# Patient Record
Sex: MALE | Race: WHITE | NOT HISPANIC OR LATINO | Employment: OTHER | URBAN - METROPOLITAN AREA
[De-identification: names, ages, dates, MRNs, and addresses within clinical notes are randomized per-mention and may not be internally consistent; named-entity substitution may affect disease eponyms.]

---

## 2017-01-01 ENCOUNTER — APPOINTMENT (OUTPATIENT)
Dept: CT IMAGING | Facility: HOSPITAL | Age: 82
End: 2017-01-01

## 2017-01-01 ENCOUNTER — HOSPITAL ENCOUNTER (INPATIENT)
Facility: HOSPITAL | Age: 82
LOS: 14 days | End: 2017-06-19
Attending: EMERGENCY MEDICINE | Admitting: INTERNAL MEDICINE

## 2017-01-01 ENCOUNTER — OFFICE VISIT (OUTPATIENT)
Dept: ENDOCRINOLOGY | Facility: CLINIC | Age: 82
End: 2017-01-01

## 2017-01-01 ENCOUNTER — TELEPHONE (OUTPATIENT)
Dept: INTERNAL MEDICINE | Facility: CLINIC | Age: 82
End: 2017-01-01

## 2017-01-01 ENCOUNTER — APPOINTMENT (OUTPATIENT)
Dept: GENERAL RADIOLOGY | Facility: HOSPITAL | Age: 82
End: 2017-01-01

## 2017-01-01 ENCOUNTER — HOSPITAL ENCOUNTER (INPATIENT)
Facility: HOSPITAL | Age: 82
LOS: 12 days | End: 2017-07-01
Attending: FAMILY MEDICINE | Admitting: FAMILY MEDICINE

## 2017-01-01 ENCOUNTER — APPOINTMENT (OUTPATIENT)
Dept: CARDIOLOGY | Facility: HOSPITAL | Age: 82
End: 2017-01-01

## 2017-01-01 VITALS
HEIGHT: 68 IN | DIASTOLIC BLOOD PRESSURE: 66 MMHG | HEART RATE: 64 BPM | WEIGHT: 153.7 LBS | SYSTOLIC BLOOD PRESSURE: 124 MMHG | OXYGEN SATURATION: 98 % | BODY MASS INDEX: 23.3 KG/M2

## 2017-01-01 VITALS
RESPIRATION RATE: 18 BRPM | HEART RATE: 54 BPM | WEIGHT: 146.2 LBS | SYSTOLIC BLOOD PRESSURE: 116 MMHG | DIASTOLIC BLOOD PRESSURE: 72 MMHG | OXYGEN SATURATION: 99 % | TEMPERATURE: 97.4 F | BODY MASS INDEX: 22.95 KG/M2 | HEIGHT: 67 IN

## 2017-01-01 VITALS
WEIGHT: 146.2 LBS | TEMPERATURE: 97 F | DIASTOLIC BLOOD PRESSURE: 79 MMHG | SYSTOLIC BLOOD PRESSURE: 145 MMHG | RESPIRATION RATE: 14 BRPM | HEIGHT: 67 IN | BODY MASS INDEX: 22.95 KG/M2 | HEART RATE: 94 BPM | OXYGEN SATURATION: 91 %

## 2017-01-01 DIAGNOSIS — I50.22 CHRONIC SYSTOLIC CONGESTIVE HEART FAILURE (HCC): ICD-10-CM

## 2017-01-01 DIAGNOSIS — I49.01 CARDIAC ARREST WITH VENTRICULAR FIBRILLATION (HCC): Primary | ICD-10-CM

## 2017-01-01 DIAGNOSIS — E78.5 DYSLIPIDEMIA: ICD-10-CM

## 2017-01-01 DIAGNOSIS — Z66: ICD-10-CM

## 2017-01-01 DIAGNOSIS — I10 BENIGN ESSENTIAL HYPERTENSION: ICD-10-CM

## 2017-01-01 DIAGNOSIS — N18.4 CHRONIC KIDNEY DISEASE, STAGE IV (SEVERE) (HCC): ICD-10-CM

## 2017-01-01 DIAGNOSIS — A41.9 SEPSIS, DUE TO UNSPECIFIED ORGANISM: ICD-10-CM

## 2017-01-01 DIAGNOSIS — Z74.09 IMPAIRED MOBILITY AND ADLS: ICD-10-CM

## 2017-01-01 DIAGNOSIS — N39.0 ACUTE UTI: ICD-10-CM

## 2017-01-01 DIAGNOSIS — E78.2 MIXED HYPERLIPIDEMIA: ICD-10-CM

## 2017-01-01 DIAGNOSIS — G89.29 CHRONIC MIDLINE LOW BACK PAIN WITHOUT SCIATICA: ICD-10-CM

## 2017-01-01 DIAGNOSIS — Z74.09 IMPAIRED FUNCTIONAL MOBILITY, BALANCE, GAIT, AND ENDURANCE: ICD-10-CM

## 2017-01-01 DIAGNOSIS — I46.9 CARDIAC ARREST WITH VENTRICULAR FIBRILLATION (HCC): Primary | ICD-10-CM

## 2017-01-01 DIAGNOSIS — M54.50 CHRONIC MIDLINE LOW BACK PAIN WITHOUT SCIATICA: ICD-10-CM

## 2017-01-01 DIAGNOSIS — M25.552 PAIN OF LEFT HIP JOINT: ICD-10-CM

## 2017-01-01 DIAGNOSIS — E11.9 TYPE 2 DIABETES MELLITUS WITHOUT COMPLICATION, WITHOUT LONG-TERM CURRENT USE OF INSULIN (HCC): Primary | ICD-10-CM

## 2017-01-01 DIAGNOSIS — I10 ESSENTIAL HYPERTENSION: ICD-10-CM

## 2017-01-01 DIAGNOSIS — Z78.9 IMPAIRED MOBILITY AND ADLS: ICD-10-CM

## 2017-01-01 LAB
ALBUMIN SERPL-MCNC: 3.3 G/DL (ref 3.2–4.8)
ALBUMIN SERPL-MCNC: 3.3 G/DL (ref 3.2–4.8)
ALBUMIN SERPL-MCNC: 3.4 G/DL (ref 3.2–4.8)
ALBUMIN SERPL-MCNC: 3.5 G/DL (ref 3.2–4.8)
ALBUMIN SERPL-MCNC: 4.1 G/DL (ref 3.2–4.8)
ALBUMIN SERPL-MCNC: 4.5 G/DL (ref 3.2–4.8)
ALBUMIN/GLOB SERPL: 1.2 G/DL (ref 1.5–2.5)
ALBUMIN/GLOB SERPL: 1.5 G/DL (ref 1.5–2.5)
ALBUMIN/GLOB SERPL: 1.5 G/DL (ref 1.5–2.5)
ALBUMIN/GLOB SERPL: 1.6 G/DL (ref 1.5–2.5)
ALBUMIN/GLOB SERPL: 1.7 G/DL (ref 1.5–2.5)
ALP SERPL-CCNC: 56 U/L (ref 25–100)
ALP SERPL-CCNC: 59 U/L (ref 25–100)
ALP SERPL-CCNC: 59 U/L (ref 25–100)
ALP SERPL-CCNC: 67 U/L (ref 25–100)
ALP SERPL-CCNC: 78 U/L (ref 25–100)
ALT SERPL W P-5'-P-CCNC: 10 U/L (ref 7–40)
ALT SERPL W P-5'-P-CCNC: 19 U/L (ref 7–40)
ALT SERPL W P-5'-P-CCNC: 22 U/L (ref 7–40)
ALT SERPL W P-5'-P-CCNC: 23 U/L (ref 7–40)
ALT SERPL W P-5'-P-CCNC: 8 U/L (ref 7–40)
ANION GAP SERPL CALCULATED.3IONS-SCNC: 10 MMOL/L (ref 3–11)
ANION GAP SERPL CALCULATED.3IONS-SCNC: 10 MMOL/L (ref 3–11)
ANION GAP SERPL CALCULATED.3IONS-SCNC: 11 MMOL/L (ref 3–11)
ANION GAP SERPL CALCULATED.3IONS-SCNC: 14 MMOL/L (ref 3–11)
ANION GAP SERPL CALCULATED.3IONS-SCNC: 7 MMOL/L (ref 3–11)
ANION GAP SERPL CALCULATED.3IONS-SCNC: 7 MMOL/L (ref 3–11)
ANION GAP SERPL CALCULATED.3IONS-SCNC: 8 MMOL/L (ref 3–11)
ANION GAP SERPL CALCULATED.3IONS-SCNC: 9 MMOL/L (ref 3–11)
ARTERIAL PATENCY WRIST A: ABNORMAL
ARTICHOKE IGE QN: 60 MG/DL (ref 0–130)
AST SERPL-CCNC: 19 U/L (ref 0–33)
AST SERPL-CCNC: 20 U/L (ref 0–33)
AST SERPL-CCNC: 22 U/L (ref 0–33)
AST SERPL-CCNC: 23 U/L (ref 0–33)
AST SERPL-CCNC: 32 U/L (ref 0–33)
ATMOSPHERIC PRESS: ABNORMAL MMHG
BACTERIA BLD CULT: ABNORMAL
BACTERIA SPEC AEROBE CULT: ABNORMAL
BACTERIA SPEC AEROBE CULT: NORMAL
BACTERIA SPEC AEROBE CULT: NORMAL
BACTERIA UR QL AUTO: ABNORMAL /HPF
BASE EXCESS BLDA CALC-SCNC: -8.2 MMOL/L (ref 0–2)
BASOPHILS # BLD AUTO: 0.01 10*3/MM3 (ref 0–0.2)
BASOPHILS # BLD AUTO: 0.02 10*3/MM3 (ref 0–0.2)
BASOPHILS # BLD AUTO: 0.03 10*3/MM3 (ref 0–0.2)
BASOPHILS # BLD AUTO: 0.04 10*3/MM3 (ref 0–0.2)
BASOPHILS # BLD AUTO: 0.04 10*3/MM3 (ref 0–0.2)
BASOPHILS NFR BLD AUTO: 0.1 % (ref 0–1)
BASOPHILS NFR BLD AUTO: 0.3 % (ref 0–1)
BASOPHILS NFR BLD AUTO: 0.5 % (ref 0–1)
BASOPHILS NFR BLD AUTO: 0.6 % (ref 0–1)
BDY SITE: ABNORMAL
BH CV ECHO MEAS - AI DEC SLOPE: 109 CM/SEC^2
BH CV ECHO MEAS - AI MAX PG: 21 MMHG
BH CV ECHO MEAS - AI MAX VEL: 229 CM/SEC
BH CV ECHO MEAS - AI P1/2T: 615.3 MSEC
BH CV ECHO MEAS - AO ROOT AREA (BSA CORRECTED): 1.9
BH CV ECHO MEAS - AO ROOT AREA: 9.1 CM^2
BH CV ECHO MEAS - AO ROOT DIAM: 3.4 CM
BH CV ECHO MEAS - BSA(HAYCOCK): 1.8 M^2
BH CV ECHO MEAS - BSA: 1.8 M^2
BH CV ECHO MEAS - BZI_BMI: 24 KILOGRAMS/M^2
BH CV ECHO MEAS - BZI_METRIC_HEIGHT: 170.2 CM
BH CV ECHO MEAS - BZI_METRIC_WEIGHT: 69.4 KG
BH CV ECHO MEAS - CONTRAST EF (2CH): 24.5 ML/M^2
BH CV ECHO MEAS - CONTRAST EF 4CH: 2.7 ML/M^2
BH CV ECHO MEAS - EDV(CUBED): 317.2 ML
BH CV ECHO MEAS - EDV(MOD-SP2): 314 ML
BH CV ECHO MEAS - EDV(MOD-SP4): 294 ML
BH CV ECHO MEAS - EDV(TEICH): 240.8 ML
BH CV ECHO MEAS - EF(CUBED): 19.7 %
BH CV ECHO MEAS - EF(MOD-SP2): 24.5 %
BH CV ECHO MEAS - EF(TEICH): 15.3 %
BH CV ECHO MEAS - ESV(CUBED): 254.8 ML
BH CV ECHO MEAS - ESV(MOD-SP2): 237 ML
BH CV ECHO MEAS - ESV(MOD-SP4): 286 ML
BH CV ECHO MEAS - ESV(TEICH): 204.1 ML
BH CV ECHO MEAS - FS: 7 %
BH CV ECHO MEAS - IVS/LVPW: 1
BH CV ECHO MEAS - IVSD: 1.3 CM
BH CV ECHO MEAS - LA DIMENSION: 3.5 CM
BH CV ECHO MEAS - LA/AO: 1
BH CV ECHO MEAS - LV DIASTOLIC VOL/BSA (35-75): 162.9 ML/M^2
BH CV ECHO MEAS - LV MASS(C)D: 405.9 GRAMS
BH CV ECHO MEAS - LV MASS(C)DI: 224.9 GRAMS/M^2
BH CV ECHO MEAS - LV SYSTOLIC VOL/BSA (12-30): 158.5 ML/M^2
BH CV ECHO MEAS - LVIDD: 6.8 CM
BH CV ECHO MEAS - LVIDS: 6.3 CM
BH CV ECHO MEAS - LVLD AP2: 10.3 CM
BH CV ECHO MEAS - LVLD AP4: 9.8 CM
BH CV ECHO MEAS - LVLS AP2: 9.5 CM
BH CV ECHO MEAS - LVLS AP4: 9.8 CM
BH CV ECHO MEAS - LVOT AREA (M): 3.8 CM^2
BH CV ECHO MEAS - LVOT AREA: 3.8 CM^2
BH CV ECHO MEAS - LVOT DIAM: 2.2 CM
BH CV ECHO MEAS - LVPWD: 1.2 CM
BH CV ECHO MEAS - MV A MAX VEL: 71.6 CM/SEC
BH CV ECHO MEAS - MV DEC SLOPE: 758.5 CM/SEC^2
BH CV ECHO MEAS - MV DEC TIME: 0.2 SEC
BH CV ECHO MEAS - MV E MAX VEL: 83.9 CM/SEC
BH CV ECHO MEAS - MV E/A: 1.2
BH CV ECHO MEAS - MV P1/2T MAX VEL: 104 CM/SEC
BH CV ECHO MEAS - MV P1/2T: 40.2 MSEC
BH CV ECHO MEAS - MVA P1/2T LCG: 2.1 CM^2
BH CV ECHO MEAS - MVA(P1/2T): 5.5 CM^2
BH CV ECHO MEAS - PA ACC SLOPE: 477 CM/SEC^2
BH CV ECHO MEAS - PA ACC TIME: 0.1 SEC
BH CV ECHO MEAS - PA PR(ACCEL): 34 MMHG
BH CV ECHO MEAS - RAP SYSTOLE: 3 MMHG
BH CV ECHO MEAS - RV MAX PG: 0.71 MMHG
BH CV ECHO MEAS - RV V1 MAX: 42 CM/SEC
BH CV ECHO MEAS - RVSP: 49 MMHG
BH CV ECHO MEAS - SI(CUBED): 34.6 ML/M^2
BH CV ECHO MEAS - SI(MOD-SP2): 42.7 ML/M^2
BH CV ECHO MEAS - SI(MOD-SP4): 4.4 ML/M^2
BH CV ECHO MEAS - SI(TEICH): 20.4 ML/M^2
BH CV ECHO MEAS - SV(CUBED): 62.4 ML
BH CV ECHO MEAS - SV(MOD-SP2): 77 ML
BH CV ECHO MEAS - SV(MOD-SP4): 8 ML
BH CV ECHO MEAS - SV(TEICH): 36.7 ML
BH CV ECHO MEAS - TAPSE (>1.6): 1.7 CM2
BH CV ECHO MEAS - TR MAX VEL: 338 CM/SEC
BH CV VAS BP LEFT ARM: NORMAL MMHG
BH CV XLRA - RV BASE: 4.5 CM
BH CV XLRA - RV LENGTH: 7.3 CM
BH CV XLRA - RV MID: 3.6 CM
BH CV XLRA - TDI S': 6.52 CM/SEC
BILIRUB SERPL-MCNC: 0.6 MG/DL (ref 0.3–1.2)
BILIRUB SERPL-MCNC: 0.7 MG/DL (ref 0.3–1.2)
BILIRUB SERPL-MCNC: 0.8 MG/DL (ref 0.3–1.2)
BILIRUB UR QL STRIP: NEGATIVE
BNP SERPL-MCNC: 2636 PG/ML (ref 0–100)
BUN BLD-MCNC: 19 MG/DL (ref 9–23)
BUN BLD-MCNC: 21 MG/DL (ref 9–23)
BUN BLD-MCNC: 23 MG/DL (ref 9–23)
BUN BLD-MCNC: 23 MG/DL (ref 9–23)
BUN BLD-MCNC: 25 MG/DL (ref 9–23)
BUN BLD-MCNC: 25 MG/DL (ref 9–23)
BUN BLD-MCNC: 26 MG/DL (ref 9–23)
BUN BLD-MCNC: 30 MG/DL (ref 9–23)
BUN/CREAT SERPL: 10.6 (ref 7–25)
BUN/CREAT SERPL: 11.4 (ref 7–25)
BUN/CREAT SERPL: 12.1 (ref 7–25)
BUN/CREAT SERPL: 12.8 (ref 7–25)
BUN/CREAT SERPL: 13.9 (ref 7–25)
BUN/CREAT SERPL: 14 (ref 7–25)
BUN/CREAT SERPL: 16.3 (ref 7–25)
BUN/CREAT SERPL: 16.3 (ref 7–25)
BUN/CREAT SERPL: 17.3 (ref 7–25)
BUN/CREAT SERPL: 18.8 (ref 7–25)
CALCIUM SPEC-SCNC: 10 MG/DL (ref 8.7–10.4)
CALCIUM SPEC-SCNC: 10.1 MG/DL (ref 8.7–10.4)
CALCIUM SPEC-SCNC: 10.2 MG/DL (ref 8.7–10.4)
CALCIUM SPEC-SCNC: 8.9 MG/DL (ref 8.7–10.4)
CALCIUM SPEC-SCNC: 9 MG/DL (ref 8.7–10.4)
CALCIUM SPEC-SCNC: 9.5 MG/DL (ref 8.7–10.4)
CALCIUM SPEC-SCNC: 9.7 MG/DL (ref 8.7–10.4)
CALCIUM SPEC-SCNC: 9.9 MG/DL (ref 8.7–10.4)
CHLORIDE SERPL-SCNC: 101 MMOL/L (ref 99–109)
CHLORIDE SERPL-SCNC: 102 MMOL/L (ref 99–109)
CHLORIDE SERPL-SCNC: 104 MMOL/L (ref 99–109)
CHLORIDE SERPL-SCNC: 105 MMOL/L (ref 99–109)
CHLORIDE SERPL-SCNC: 105 MMOL/L (ref 99–109)
CHLORIDE SERPL-SCNC: 106 MMOL/L (ref 99–109)
CHLORIDE SERPL-SCNC: 107 MMOL/L (ref 99–109)
CHLORIDE SERPL-SCNC: 109 MMOL/L (ref 99–109)
CHOLEST SERPL-MCNC: 141 MG/DL (ref 0–200)
CLARITY UR: CLEAR
CO2 BLDA-SCNC: 16.8 MMOL/L (ref 22–33)
CO2 SERPL-SCNC: 22 MMOL/L (ref 20–31)
CO2 SERPL-SCNC: 22 MMOL/L (ref 20–31)
CO2 SERPL-SCNC: 23 MMOL/L (ref 20–31)
CO2 SERPL-SCNC: 24 MMOL/L (ref 20–31)
CO2 SERPL-SCNC: 29 MMOL/L (ref 20–31)
COHGB MFR BLD: 0.8 % (ref 0–2)
COLOR UR: YELLOW
CREAT BLD-MCNC: 1.5 MG/DL (ref 0.6–1.3)
CREAT BLD-MCNC: 1.5 MG/DL (ref 0.6–1.3)
CREAT BLD-MCNC: 1.6 MG/DL (ref 0.6–1.3)
CREAT BLD-MCNC: 1.8 MG/DL (ref 0.6–1.3)
CREAT BLD-MCNC: 1.9 MG/DL (ref 0.6–1.3)
CREAT BLD-MCNC: 2.2 MG/DL (ref 0.6–1.3)
CRP SERPL-MCNC: 12.5 MG/DL (ref 0–1)
D-LACTATE SERPL-SCNC: 1.8 MMOL/L (ref 0.5–2)
D-LACTATE SERPL-SCNC: 2.5 MMOL/L (ref 0.5–2)
DEPRECATED RDW RBC AUTO: 47 FL (ref 37–54)
DEPRECATED RDW RBC AUTO: 47.4 FL (ref 37–54)
DEPRECATED RDW RBC AUTO: 48.2 FL (ref 37–54)
DEPRECATED RDW RBC AUTO: 48.3 FL (ref 37–54)
DEPRECATED RDW RBC AUTO: 49.3 FL (ref 37–54)
DEPRECATED RDW RBC AUTO: 49.8 FL (ref 37–54)
DEPRECATED RDW RBC AUTO: 50.1 FL (ref 37–54)
DEPRECATED RDW RBC AUTO: 50.2 FL (ref 37–54)
DEPRECATED RDW RBC AUTO: 54 FL (ref 37–54)
EOSINOPHIL # BLD AUTO: 0 10*3/MM3 (ref 0.1–0.3)
EOSINOPHIL # BLD AUTO: 0.01 10*3/MM3 (ref 0.1–0.3)
EOSINOPHIL # BLD AUTO: 0.03 10*3/MM3 (ref 0.1–0.3)
EOSINOPHIL # BLD AUTO: 0.07 10*3/MM3 (ref 0.1–0.3)
EOSINOPHIL # BLD AUTO: 0.1 10*3/MM3 (ref 0.1–0.3)
EOSINOPHIL # BLD AUTO: 0.16 10*3/MM3 (ref 0.1–0.3)
EOSINOPHIL # BLD AUTO: 0.2 10*3/MM3 (ref 0.1–0.3)
EOSINOPHIL NFR BLD AUTO: 0 % (ref 0–3)
EOSINOPHIL NFR BLD AUTO: 0.1 % (ref 0–3)
EOSINOPHIL NFR BLD AUTO: 0.2 % (ref 0–3)
EOSINOPHIL NFR BLD AUTO: 0.9 % (ref 0–3)
EOSINOPHIL NFR BLD AUTO: 1.3 % (ref 0–3)
EOSINOPHIL NFR BLD AUTO: 1.7 % (ref 0–3)
EOSINOPHIL NFR BLD AUTO: 2.8 % (ref 0–3)
ERYTHROCYTE [DISTWIDTH] IN BLOOD BY AUTOMATED COUNT: 13.4 % (ref 11.3–14.5)
ERYTHROCYTE [DISTWIDTH] IN BLOOD BY AUTOMATED COUNT: 13.4 % (ref 11.3–14.5)
ERYTHROCYTE [DISTWIDTH] IN BLOOD BY AUTOMATED COUNT: 13.6 % (ref 11.3–14.5)
ERYTHROCYTE [DISTWIDTH] IN BLOOD BY AUTOMATED COUNT: 13.7 % (ref 11.3–14.5)
ERYTHROCYTE [DISTWIDTH] IN BLOOD BY AUTOMATED COUNT: 13.8 % (ref 11.3–14.5)
ERYTHROCYTE [DISTWIDTH] IN BLOOD BY AUTOMATED COUNT: 13.9 % (ref 11.3–14.5)
ERYTHROCYTE [DISTWIDTH] IN BLOOD BY AUTOMATED COUNT: 13.9 % (ref 11.3–14.5)
ERYTHROCYTE [DISTWIDTH] IN BLOOD BY AUTOMATED COUNT: 14.1 % (ref 11.3–14.5)
ERYTHROCYTE [DISTWIDTH] IN BLOOD BY AUTOMATED COUNT: 14.3 % (ref 11.3–14.5)
ERYTHROCYTE [SEDIMENTATION RATE] IN BLOOD: <1 MM/HR (ref 0–20)
FOLATE SERPL-MCNC: >24 NG/ML (ref 3.2–20)
GFR SERPL CREATININE-BSD FRML MDRD: 28 ML/MIN/1.73
GFR SERPL CREATININE-BSD FRML MDRD: 34 ML/MIN/1.73
GFR SERPL CREATININE-BSD FRML MDRD: 36 ML/MIN/1.73
GFR SERPL CREATININE-BSD FRML MDRD: 41 ML/MIN/1.73
GFR SERPL CREATININE-BSD FRML MDRD: 44 ML/MIN/1.73
GFR SERPL CREATININE-BSD FRML MDRD: 44 ML/MIN/1.73
GLOBULIN UR ELPH-MCNC: 2.3 GM/DL
GLOBULIN UR ELPH-MCNC: 2.4 GM/DL
GLOBULIN UR ELPH-MCNC: 2.4 GM/DL
GLOBULIN UR ELPH-MCNC: 2.7 GM/DL
GLOBULIN UR ELPH-MCNC: 2.8 GM/DL
GLUCOSE BLD-MCNC: 118 MG/DL (ref 70–100)
GLUCOSE BLD-MCNC: 122 MG/DL (ref 70–100)
GLUCOSE BLD-MCNC: 129 MG/DL (ref 70–100)
GLUCOSE BLD-MCNC: 132 MG/DL (ref 70–100)
GLUCOSE BLD-MCNC: 137 MG/DL (ref 70–100)
GLUCOSE BLD-MCNC: 143 MG/DL (ref 70–100)
GLUCOSE BLD-MCNC: 157 MG/DL (ref 70–100)
GLUCOSE BLD-MCNC: 162 MG/DL (ref 70–100)
GLUCOSE BLD-MCNC: 180 MG/DL (ref 70–100)
GLUCOSE BLD-MCNC: 242 MG/DL (ref 70–100)
GLUCOSE BLDC GLUCOMTR-MCNC: 105 MG/DL (ref 70–130)
GLUCOSE BLDC GLUCOMTR-MCNC: 107 MG/DL (ref 70–130)
GLUCOSE BLDC GLUCOMTR-MCNC: 107 MG/DL (ref 70–130)
GLUCOSE BLDC GLUCOMTR-MCNC: 108 MG/DL (ref 70–130)
GLUCOSE BLDC GLUCOMTR-MCNC: 113 MG/DL (ref 70–130)
GLUCOSE BLDC GLUCOMTR-MCNC: 114 MG/DL (ref 70–130)
GLUCOSE BLDC GLUCOMTR-MCNC: 114 MG/DL (ref 70–130)
GLUCOSE BLDC GLUCOMTR-MCNC: 115 MG/DL (ref 70–130)
GLUCOSE BLDC GLUCOMTR-MCNC: 116 MG/DL (ref 70–130)
GLUCOSE BLDC GLUCOMTR-MCNC: 118 MG/DL (ref 70–130)
GLUCOSE BLDC GLUCOMTR-MCNC: 124 MG/DL (ref 70–130)
GLUCOSE BLDC GLUCOMTR-MCNC: 125 MG/DL (ref 70–130)
GLUCOSE BLDC GLUCOMTR-MCNC: 127 MG/DL (ref 70–130)
GLUCOSE BLDC GLUCOMTR-MCNC: 128 MG/DL (ref 70–130)
GLUCOSE BLDC GLUCOMTR-MCNC: 130 MG/DL (ref 70–130)
GLUCOSE BLDC GLUCOMTR-MCNC: 132 MG/DL (ref 70–130)
GLUCOSE BLDC GLUCOMTR-MCNC: 133 MG/DL (ref 70–130)
GLUCOSE BLDC GLUCOMTR-MCNC: 133 MG/DL (ref 70–130)
GLUCOSE BLDC GLUCOMTR-MCNC: 135 MG/DL (ref 70–130)
GLUCOSE BLDC GLUCOMTR-MCNC: 136 MG/DL (ref 70–130)
GLUCOSE BLDC GLUCOMTR-MCNC: 136 MG/DL (ref 70–130)
GLUCOSE BLDC GLUCOMTR-MCNC: 139 MG/DL (ref 70–130)
GLUCOSE BLDC GLUCOMTR-MCNC: 142 MG/DL (ref 70–130)
GLUCOSE BLDC GLUCOMTR-MCNC: 144 MG/DL (ref 70–130)
GLUCOSE BLDC GLUCOMTR-MCNC: 144 MG/DL (ref 70–130)
GLUCOSE BLDC GLUCOMTR-MCNC: 145 MG/DL (ref 70–130)
GLUCOSE BLDC GLUCOMTR-MCNC: 149 MG/DL (ref 70–130)
GLUCOSE BLDC GLUCOMTR-MCNC: 150 MG/DL (ref 70–130)
GLUCOSE BLDC GLUCOMTR-MCNC: 152 MG/DL (ref 70–130)
GLUCOSE BLDC GLUCOMTR-MCNC: 154 MG/DL (ref 70–130)
GLUCOSE BLDC GLUCOMTR-MCNC: 156 MG/DL (ref 70–130)
GLUCOSE BLDC GLUCOMTR-MCNC: 156 MG/DL (ref 70–130)
GLUCOSE BLDC GLUCOMTR-MCNC: 159 MG/DL (ref 70–130)
GLUCOSE BLDC GLUCOMTR-MCNC: 159 MG/DL (ref 70–130)
GLUCOSE BLDC GLUCOMTR-MCNC: 160 MG/DL (ref 70–130)
GLUCOSE BLDC GLUCOMTR-MCNC: 161 MG/DL (ref 70–130)
GLUCOSE BLDC GLUCOMTR-MCNC: 168 MG/DL (ref 70–130)
GLUCOSE BLDC GLUCOMTR-MCNC: 173 MG/DL (ref 70–130)
GLUCOSE BLDC GLUCOMTR-MCNC: 174 MG/DL (ref 70–130)
GLUCOSE BLDC GLUCOMTR-MCNC: 180 MG/DL (ref 70–130)
GLUCOSE BLDC GLUCOMTR-MCNC: 180 MG/DL (ref 70–130)
GLUCOSE BLDC GLUCOMTR-MCNC: 181 MG/DL (ref 70–130)
GLUCOSE BLDC GLUCOMTR-MCNC: 188 MG/DL (ref 70–130)
GLUCOSE BLDC GLUCOMTR-MCNC: 192 MG/DL (ref 70–130)
GLUCOSE BLDC GLUCOMTR-MCNC: 197 MG/DL (ref 70–130)
GLUCOSE BLDC GLUCOMTR-MCNC: 197 MG/DL (ref 70–130)
GLUCOSE BLDC GLUCOMTR-MCNC: 217 MG/DL (ref 70–130)
GLUCOSE BLDC GLUCOMTR-MCNC: 237 MG/DL (ref 70–130)
GLUCOSE BLDC GLUCOMTR-MCNC: 247 MG/DL (ref 70–130)
GLUCOSE BLDC GLUCOMTR-MCNC: 71 MG/DL (ref 70–130)
GLUCOSE BLDC GLUCOMTR-MCNC: 89 MG/DL (ref 70–130)
GLUCOSE BLDC GLUCOMTR-MCNC: 92 MG/DL (ref 70–130)
GLUCOSE BLDC GLUCOMTR-MCNC: 98 MG/DL (ref 70–130)
GLUCOSE UR STRIP-MCNC: NEGATIVE MG/DL
GRAM STN SPEC: ABNORMAL
HBA1C MFR BLD: 6.1 %
HCO3 BLDA-SCNC: 16 MMOL/L (ref 20–26)
HCT VFR BLD AUTO: 37.3 % (ref 38.9–50.9)
HCT VFR BLD AUTO: 37.7 % (ref 38.9–50.9)
HCT VFR BLD AUTO: 37.9 % (ref 38.9–50.9)
HCT VFR BLD AUTO: 38.2 % (ref 38.9–50.9)
HCT VFR BLD AUTO: 38.7 % (ref 38.9–50.9)
HCT VFR BLD AUTO: 41.3 % (ref 38.9–50.9)
HCT VFR BLD AUTO: 43.4 % (ref 38.9–50.9)
HCT VFR BLD AUTO: 45.2 % (ref 38.9–50.9)
HCT VFR BLD AUTO: 48 % (ref 38.9–50.9)
HCT VFR BLD CALC: 43.5 %
HDLC SERPL-MCNC: 62 MG/DL (ref 40–60)
HGB BLD-MCNC: 11.8 G/DL (ref 13.1–17.5)
HGB BLD-MCNC: 12 G/DL (ref 13.1–17.5)
HGB BLD-MCNC: 12.2 G/DL (ref 13.1–17.5)
HGB BLD-MCNC: 12.2 G/DL (ref 13.1–17.5)
HGB BLD-MCNC: 12.3 G/DL (ref 13.1–17.5)
HGB BLD-MCNC: 13.2 G/DL (ref 13.1–17.5)
HGB BLD-MCNC: 13.3 G/DL (ref 13.1–17.5)
HGB BLD-MCNC: 14.6 G/DL (ref 13.1–17.5)
HGB BLD-MCNC: 15.6 G/DL (ref 13.1–17.5)
HGB BLDA-MCNC: 14.2 G/DL (ref 13.5–17.5)
HGB UR QL STRIP.AUTO: ABNORMAL
HOLD SPECIMEN: NORMAL
HOLD SPECIMEN: NORMAL
HOROWITZ INDEX BLD+IHG-RTO: 100 %
HYALINE CASTS UR QL AUTO: ABNORMAL /LPF
IMM GRANULOCYTES # BLD: 0.01 10*3/MM3 (ref 0–0.03)
IMM GRANULOCYTES # BLD: 0.02 10*3/MM3 (ref 0–0.03)
IMM GRANULOCYTES # BLD: 0.03 10*3/MM3 (ref 0–0.03)
IMM GRANULOCYTES NFR BLD: 0.1 % (ref 0–0.6)
IMM GRANULOCYTES NFR BLD: 0.2 % (ref 0–0.6)
IMM GRANULOCYTES NFR BLD: 0.3 % (ref 0–0.6)
IMM GRANULOCYTES NFR BLD: 0.3 % (ref 0–0.6)
INR PPP: 1.05
ISOLATED FROM: ABNORMAL
ISOLATED FROM: ABNORMAL
KETONES UR QL STRIP: NEGATIVE
LEFT ATRIUM VOLUME INDEX: 27.8 ML/M2
LEFT ATRIUM VOLUME: 50 CM3
LEUKOCYTE ESTERASE UR QL STRIP.AUTO: ABNORMAL
LIPASE SERPL-CCNC: 33 U/L (ref 6–51)
LV EF 2D ECHO EST: 15 %
LYMPHOCYTES # BLD AUTO: 0.51 10*3/MM3 (ref 0.6–4.8)
LYMPHOCYTES # BLD AUTO: 0.59 10*3/MM3 (ref 0.6–4.8)
LYMPHOCYTES # BLD AUTO: 0.68 10*3/MM3 (ref 0.6–4.8)
LYMPHOCYTES # BLD AUTO: 0.78 10*3/MM3 (ref 0.6–4.8)
LYMPHOCYTES # BLD AUTO: 0.81 10*3/MM3 (ref 0.6–4.8)
LYMPHOCYTES # BLD AUTO: 0.94 10*3/MM3 (ref 0.6–4.8)
LYMPHOCYTES # BLD AUTO: 1.37 10*3/MM3 (ref 0.6–4.8)
LYMPHOCYTES NFR BLD AUTO: 18.8 % (ref 24–44)
LYMPHOCYTES NFR BLD AUTO: 4.5 % (ref 24–44)
LYMPHOCYTES NFR BLD AUTO: 6.3 % (ref 24–44)
LYMPHOCYTES NFR BLD AUTO: 7 % (ref 24–44)
LYMPHOCYTES NFR BLD AUTO: 9 % (ref 24–44)
LYMPHOCYTES NFR BLD AUTO: 9.8 % (ref 24–44)
LYMPHOCYTES NFR BLD AUTO: 9.8 % (ref 24–44)
MAGNESIUM SERPL-MCNC: 1.7 MG/DL (ref 1.3–2.7)
MAGNESIUM SERPL-MCNC: 2 MG/DL (ref 1.3–2.7)
MAGNESIUM SERPL-MCNC: 2.2 MG/DL (ref 1.3–2.7)
MCH RBC QN AUTO: 30.7 PG (ref 27–31)
MCH RBC QN AUTO: 31 PG (ref 27–31)
MCH RBC QN AUTO: 31.5 PG (ref 27–31)
MCH RBC QN AUTO: 31.6 PG (ref 27–31)
MCH RBC QN AUTO: 31.6 PG (ref 27–31)
MCH RBC QN AUTO: 31.7 PG (ref 27–31)
MCH RBC QN AUTO: 31.8 PG (ref 27–31)
MCHC RBC AUTO-ENTMCNC: 30.6 G/DL (ref 32–36)
MCHC RBC AUTO-ENTMCNC: 31.3 G/DL (ref 32–36)
MCHC RBC AUTO-ENTMCNC: 31.4 G/DL (ref 32–36)
MCHC RBC AUTO-ENTMCNC: 31.8 G/DL (ref 32–36)
MCHC RBC AUTO-ENTMCNC: 32 G/DL (ref 32–36)
MCHC RBC AUTO-ENTMCNC: 32.2 G/DL (ref 32–36)
MCHC RBC AUTO-ENTMCNC: 32.3 G/DL (ref 32–36)
MCHC RBC AUTO-ENTMCNC: 32.5 G/DL (ref 32–36)
MCHC RBC AUTO-ENTMCNC: 32.7 G/DL (ref 32–36)
MCV RBC AUTO: 103.3 FL (ref 80–99)
MCV RBC AUTO: 95.2 FL (ref 80–99)
MCV RBC AUTO: 96.4 FL (ref 80–99)
MCV RBC AUTO: 97.5 FL (ref 80–99)
MCV RBC AUTO: 97.8 FL (ref 80–99)
MCV RBC AUTO: 98 FL (ref 80–99)
MCV RBC AUTO: 98.7 FL (ref 80–99)
MCV RBC AUTO: 98.8 FL (ref 80–99)
MCV RBC AUTO: 99 FL (ref 80–99)
METHGB BLD QL: 1 % (ref 0–1.5)
MODALITY: ABNORMAL
MONOCYTES # BLD AUTO: 0.79 10*3/MM3 (ref 0–1)
MONOCYTES # BLD AUTO: 0.81 10*3/MM3 (ref 0–1)
MONOCYTES # BLD AUTO: 0.95 10*3/MM3 (ref 0–1)
MONOCYTES # BLD AUTO: 0.96 10*3/MM3 (ref 0–1)
MONOCYTES # BLD AUTO: 1.04 10*3/MM3 (ref 0–1)
MONOCYTES # BLD AUTO: 1.24 10*3/MM3 (ref 0–1)
MONOCYTES # BLD AUTO: 1.34 10*3/MM3 (ref 0–1)
MONOCYTES NFR BLD AUTO: 10.8 % (ref 0–12)
MONOCYTES NFR BLD AUTO: 10.9 % (ref 0–12)
MONOCYTES NFR BLD AUTO: 11.1 % (ref 0–12)
MONOCYTES NFR BLD AUTO: 11.5 % (ref 0–12)
MONOCYTES NFR BLD AUTO: 11.7 % (ref 0–12)
MONOCYTES NFR BLD AUTO: 12.1 % (ref 0–12)
MONOCYTES NFR BLD AUTO: 9.2 % (ref 0–12)
NEUTROPHILS # BLD AUTO: 11.2 10*3/MM3 (ref 1.5–8.3)
NEUTROPHILS # BLD AUTO: 4.86 10*3/MM3 (ref 1.5–8.3)
NEUTROPHILS # BLD AUTO: 5.87 10*3/MM3 (ref 1.5–8.3)
NEUTROPHILS # BLD AUTO: 6.12 10*3/MM3 (ref 1.5–8.3)
NEUTROPHILS # BLD AUTO: 6.47 10*3/MM3 (ref 1.5–8.3)
NEUTROPHILS # BLD AUTO: 7.52 10*3/MM3 (ref 1.5–8.3)
NEUTROPHILS # BLD AUTO: 9.53 10*3/MM3 (ref 1.5–8.3)
NEUTROPHILS NFR BLD AUTO: 66.8 % (ref 41–71)
NEUTROPHILS NFR BLD AUTO: 76.8 % (ref 41–71)
NEUTROPHILS NFR BLD AUTO: 78.1 % (ref 41–71)
NEUTROPHILS NFR BLD AUTO: 78.3 % (ref 41–71)
NEUTROPHILS NFR BLD AUTO: 80.4 % (ref 41–71)
NEUTROPHILS NFR BLD AUTO: 83.3 % (ref 41–71)
NEUTROPHILS NFR BLD AUTO: 83.5 % (ref 41–71)
NITRITE UR QL STRIP: NEGATIVE
OXYHGB MFR BLDV: 95.5 % (ref 94–99)
PCO2 BLDA: 27.3 MM HG (ref 35–48)
PH BLDA: 7.38 PH UNITS (ref 7.35–7.45)
PH UR STRIP.AUTO: 6 [PH] (ref 5–8)
PHOSPHATE SERPL-MCNC: 2.4 MG/DL (ref 2.4–5.1)
PHOSPHATE SERPL-MCNC: 2.4 MG/DL (ref 2.4–5.1)
PLATELET # BLD AUTO: 120 10*3/MM3 (ref 150–450)
PLATELET # BLD AUTO: 132 10*3/MM3 (ref 150–450)
PLATELET # BLD AUTO: 135 10*3/MM3 (ref 150–450)
PLATELET # BLD AUTO: 157 10*3/MM3 (ref 150–450)
PLATELET # BLD AUTO: 162 10*3/MM3 (ref 150–450)
PLATELET # BLD AUTO: 173 10*3/MM3 (ref 150–450)
PLATELET # BLD AUTO: 203 10*3/MM3 (ref 150–450)
PLATELET # BLD AUTO: 229 10*3/MM3 (ref 150–450)
PLATELET # BLD AUTO: 231 10*3/MM3 (ref 150–450)
PMV BLD AUTO: 10 FL (ref 6–12)
PMV BLD AUTO: 10.8 FL (ref 6–12)
PMV BLD AUTO: 10.9 FL (ref 6–12)
PMV BLD AUTO: 11.2 FL (ref 6–12)
PMV BLD AUTO: 11.4 FL (ref 6–12)
PMV BLD AUTO: 11.6 FL (ref 6–12)
PMV BLD AUTO: 11.8 FL (ref 6–12)
PMV BLD AUTO: 9.8 FL (ref 6–12)
PMV BLD AUTO: 9.8 FL (ref 6–12)
PO2 BLDA: 91.7 MM HG (ref 83–108)
POTASSIUM BLD-SCNC: 3.3 MMOL/L (ref 3.5–5.5)
POTASSIUM BLD-SCNC: 3.5 MMOL/L (ref 3.5–5.5)
POTASSIUM BLD-SCNC: 3.6 MMOL/L (ref 3.5–5.5)
POTASSIUM BLD-SCNC: 3.6 MMOL/L (ref 3.5–5.5)
POTASSIUM BLD-SCNC: 3.7 MMOL/L (ref 3.5–5.5)
POTASSIUM BLD-SCNC: 3.8 MMOL/L (ref 3.5–5.5)
POTASSIUM BLD-SCNC: 3.8 MMOL/L (ref 3.5–5.5)
POTASSIUM BLD-SCNC: 4.4 MMOL/L (ref 3.5–5.5)
POTASSIUM BLD-SCNC: 4.5 MMOL/L (ref 3.5–5.5)
PROCALCITONIN SERPL-MCNC: 0.11 NG/ML
PROT SERPL-MCNC: 5.7 G/DL (ref 5.7–8.2)
PROT SERPL-MCNC: 5.9 G/DL (ref 5.7–8.2)
PROT SERPL-MCNC: 6 G/DL (ref 5.7–8.2)
PROT SERPL-MCNC: 6.5 G/DL (ref 5.7–8.2)
PROT SERPL-MCNC: 7.3 G/DL (ref 5.7–8.2)
PROT UR QL STRIP: ABNORMAL
PROTHROMBIN TIME: 11.5 SECONDS (ref 9.6–11.5)
RBC # BLD AUTO: 3.81 10*6/MM3 (ref 4.2–5.76)
RBC # BLD AUTO: 3.87 10*6/MM3 (ref 4.2–5.76)
RBC # BLD AUTO: 3.87 10*6/MM3 (ref 4.2–5.76)
RBC # BLD AUTO: 3.97 10*6/MM3 (ref 4.2–5.76)
RBC # BLD AUTO: 3.98 10*6/MM3 (ref 4.2–5.76)
RBC # BLD AUTO: 4.18 10*6/MM3 (ref 4.2–5.76)
RBC # BLD AUTO: 4.2 10*6/MM3 (ref 4.2–5.76)
RBC # BLD AUTO: 4.62 10*6/MM3 (ref 4.2–5.76)
RBC # BLD AUTO: 4.9 10*6/MM3 (ref 4.2–5.76)
RBC # UR: ABNORMAL /HPF
REF LAB TEST METHOD: ABNORMAL
SODIUM BLD-SCNC: 131 MMOL/L (ref 132–146)
SODIUM BLD-SCNC: 132 MMOL/L (ref 132–146)
SODIUM BLD-SCNC: 134 MMOL/L (ref 132–146)
SODIUM BLD-SCNC: 136 MMOL/L (ref 132–146)
SODIUM BLD-SCNC: 138 MMOL/L (ref 132–146)
SODIUM BLD-SCNC: 139 MMOL/L (ref 132–146)
SODIUM BLD-SCNC: 140 MMOL/L (ref 132–146)
SODIUM BLD-SCNC: 140 MMOL/L (ref 132–146)
SODIUM BLD-SCNC: 141 MMOL/L (ref 132–146)
SODIUM BLD-SCNC: 143 MMOL/L (ref 132–146)
SP GR UR STRIP: 1.01 (ref 1–1.03)
SQUAMOUS #/AREA URNS HPF: ABNORMAL /HPF
STREP GROUPING: ABNORMAL
STREP GROUPING: ABNORMAL
TRIGL SERPL-MCNC: 145 MG/DL (ref 0–150)
TROPONIN I SERPL-MCNC: 0.42 NG/ML
TROPONIN I SERPL-MCNC: 0.49 NG/ML
TROPONIN I SERPL-MCNC: 0.5 NG/ML
TROPONIN I SERPL-MCNC: 0.75 NG/ML
TROPONIN I SERPL-MCNC: 0.76 NG/ML
TSH SERPL DL<=0.05 MIU/L-ACNC: 0.74 MIU/ML (ref 0.35–5.35)
UROBILINOGEN UR QL STRIP: ABNORMAL
VIT B12 BLD-MCNC: 571 PG/ML (ref 211–911)
WBC NRBC COR # BLD: 11.41 10*3/MM3 (ref 3.5–10.8)
WBC NRBC COR # BLD: 13.46 10*3/MM3 (ref 3.5–10.8)
WBC NRBC COR # BLD: 18.34 10*3/MM3 (ref 3.5–10.8)
WBC NRBC COR # BLD: 7.27 10*3/MM3 (ref 3.5–10.8)
WBC NRBC COR # BLD: 7.52 10*3/MM3 (ref 3.5–10.8)
WBC NRBC COR # BLD: 7.96 10*3/MM3 (ref 3.5–10.8)
WBC NRBC COR # BLD: 8.27 10*3/MM3 (ref 3.5–10.8)
WBC NRBC COR # BLD: 8.6 10*3/MM3 (ref 3.5–10.8)
WBC NRBC COR # BLD: 9.36 10*3/MM3 (ref 3.5–10.8)
WBC UR QL AUTO: ABNORMAL /HPF
WHOLE BLOOD HOLD SPECIMEN: NORMAL
WHOLE BLOOD HOLD SPECIMEN: NORMAL

## 2017-01-01 PROCEDURE — 87186 SC STD MICRODIL/AGAR DIL: CPT | Performed by: EMERGENCY MEDICINE

## 2017-01-01 PROCEDURE — 82962 GLUCOSE BLOOD TEST: CPT

## 2017-01-01 PROCEDURE — 81001 URINALYSIS AUTO W/SCOPE: CPT | Performed by: EMERGENCY MEDICINE

## 2017-01-01 PROCEDURE — 25010000003 CEFTRIAXONE PER 250 MG: Performed by: INTERNAL MEDICINE

## 2017-01-01 PROCEDURE — 85025 COMPLETE CBC W/AUTO DIFF WBC: CPT | Performed by: NURSE PRACTITIONER

## 2017-01-01 PROCEDURE — 25010000002 HALOPERIDOL LACTATE PER 5 MG: Performed by: FAMILY MEDICINE

## 2017-01-01 PROCEDURE — 25010000002 CEFTRIAXONE PER 250 MG: Performed by: NURSE PRACTITIONER

## 2017-01-01 PROCEDURE — 83735 ASSAY OF MAGNESIUM: CPT | Performed by: NURSE PRACTITIONER

## 2017-01-01 PROCEDURE — 83036 HEMOGLOBIN GLYCOSYLATED A1C: CPT | Performed by: INTERNAL MEDICINE

## 2017-01-01 PROCEDURE — 93010 ELECTROCARDIOGRAM REPORT: CPT | Performed by: INTERNAL MEDICINE

## 2017-01-01 PROCEDURE — 99232 SBSQ HOSP IP/OBS MODERATE 35: CPT | Performed by: PHYSICIAN ASSISTANT

## 2017-01-01 PROCEDURE — 97116 GAIT TRAINING THERAPY: CPT

## 2017-01-01 PROCEDURE — 25010000002 PIPERACILLIN-TAZOBACTAM: Performed by: EMERGENCY MEDICINE

## 2017-01-01 PROCEDURE — 83880 ASSAY OF NATRIURETIC PEPTIDE: CPT | Performed by: EMERGENCY MEDICINE

## 2017-01-01 PROCEDURE — 83735 ASSAY OF MAGNESIUM: CPT | Performed by: INTERNAL MEDICINE

## 2017-01-01 PROCEDURE — 99232 SBSQ HOSP IP/OBS MODERATE 35: CPT | Performed by: NURSE PRACTITIONER

## 2017-01-01 PROCEDURE — 5A2204Z RESTORATION OF CARDIAC RHYTHM, SINGLE: ICD-10-PCS | Performed by: EMERGENCY MEDICINE

## 2017-01-01 PROCEDURE — 87040 BLOOD CULTURE FOR BACTERIA: CPT | Performed by: EMERGENCY MEDICINE

## 2017-01-01 PROCEDURE — 80053 COMPREHEN METABOLIC PANEL: CPT | Performed by: EMERGENCY MEDICINE

## 2017-01-01 PROCEDURE — 85610 PROTHROMBIN TIME: CPT | Performed by: EMERGENCY MEDICINE

## 2017-01-01 PROCEDURE — 25010000002 HEPARIN (PORCINE) PER 1000 UNITS: Performed by: INTERNAL MEDICINE

## 2017-01-01 PROCEDURE — 25010000002 AMIODARONE IN DEXTROSE 5% 360-4.14 MG/200ML-% SOLUTION: Performed by: INTERNAL MEDICINE

## 2017-01-01 PROCEDURE — 25010000002 LORAZEPAM PER 2 MG: Performed by: FAMILY MEDICINE

## 2017-01-01 PROCEDURE — 63710000001 INSULIN LISPRO (HUMAN) PER 5 UNITS: Performed by: INTERNAL MEDICINE

## 2017-01-01 PROCEDURE — 87086 URINE CULTURE/COLONY COUNT: CPT | Performed by: EMERGENCY MEDICINE

## 2017-01-01 PROCEDURE — 85025 COMPLETE CBC W/AUTO DIFF WBC: CPT | Performed by: INTERNAL MEDICINE

## 2017-01-01 PROCEDURE — 82746 ASSAY OF FOLIC ACID SERUM: CPT | Performed by: PSYCHIATRY & NEUROLOGY

## 2017-01-01 PROCEDURE — 99291 CRITICAL CARE FIRST HOUR: CPT

## 2017-01-01 PROCEDURE — 99238 HOSP IP/OBS DSCHRG MGMT 30/<: CPT | Performed by: PHYSICIAN ASSISTANT

## 2017-01-01 PROCEDURE — 97110 THERAPEUTIC EXERCISES: CPT

## 2017-01-01 PROCEDURE — 99231 SBSQ HOSP IP/OBS SF/LOW 25: CPT | Performed by: PHYSICIAN ASSISTANT

## 2017-01-01 PROCEDURE — 83605 ASSAY OF LACTIC ACID: CPT | Performed by: NURSE PRACTITIONER

## 2017-01-01 PROCEDURE — 99233 SBSQ HOSP IP/OBS HIGH 50: CPT | Performed by: INTERNAL MEDICINE

## 2017-01-01 PROCEDURE — 25010000003 PHENOBARBITAL PER 120 MG: Performed by: FAMILY MEDICINE

## 2017-01-01 PROCEDURE — 25010000002 LORAZEPAM PER 2 MG: Performed by: INTERNAL MEDICINE

## 2017-01-01 PROCEDURE — 36600 WITHDRAWAL OF ARTERIAL BLOOD: CPT | Performed by: EMERGENCY MEDICINE

## 2017-01-01 PROCEDURE — 87147 CULTURE TYPE IMMUNOLOGIC: CPT | Performed by: EMERGENCY MEDICINE

## 2017-01-01 PROCEDURE — C8929 TTE W OR WO FOL WCON,DOPPLER: HCPCS

## 2017-01-01 PROCEDURE — 25010000002 MORPHINE PER 10 MG: Performed by: FAMILY MEDICINE

## 2017-01-01 PROCEDURE — 93005 ELECTROCARDIOGRAM TRACING: CPT

## 2017-01-01 PROCEDURE — 25010000002 MORPHINE SULFATE (PF) 2 MG/ML SOLUTION: Performed by: FAMILY MEDICINE

## 2017-01-01 PROCEDURE — 84484 ASSAY OF TROPONIN QUANT: CPT | Performed by: NURSE PRACTITIONER

## 2017-01-01 PROCEDURE — 25010000003 POTASSIUM CHLORIDE 10 MEQ/100ML SOLUTION: Performed by: NURSE PRACTITIONER

## 2017-01-01 PROCEDURE — 70450 CT HEAD/BRAIN W/O DYE: CPT

## 2017-01-01 PROCEDURE — 25010000002 EPINEPHRINE PER 0.1 MG: Performed by: EMERGENCY MEDICINE

## 2017-01-01 PROCEDURE — 63710000001 INSULIN REGULAR HUMAN PER 5 UNITS: Performed by: NURSE PRACTITIONER

## 2017-01-01 PROCEDURE — 85025 COMPLETE CBC W/AUTO DIFF WBC: CPT | Performed by: FAMILY MEDICINE

## 2017-01-01 PROCEDURE — 87040 BLOOD CULTURE FOR BACTERIA: CPT | Performed by: HOSPITALIST

## 2017-01-01 PROCEDURE — 80061 LIPID PANEL: CPT | Performed by: INTERNAL MEDICINE

## 2017-01-01 PROCEDURE — 84484 ASSAY OF TROPONIN QUANT: CPT | Performed by: EMERGENCY MEDICINE

## 2017-01-01 PROCEDURE — 82805 BLOOD GASES W/O2 SATURATION: CPT | Performed by: EMERGENCY MEDICINE

## 2017-01-01 PROCEDURE — 83605 ASSAY OF LACTIC ACID: CPT | Performed by: EMERGENCY MEDICINE

## 2017-01-01 PROCEDURE — 71010 HC CHEST PA OR AP: CPT

## 2017-01-01 PROCEDURE — 99232 SBSQ HOSP IP/OBS MODERATE 35: CPT | Performed by: INTERNAL MEDICINE

## 2017-01-01 PROCEDURE — 25010000003 PHENOBARBITAL PER 120 MG: Performed by: NURSE PRACTITIONER

## 2017-01-01 PROCEDURE — 25010000002 SULFUR HEXAFLUORIDE MICROSPH 60.7-25 MG RECONSTITUTED SUSPENSION: Performed by: INTERNAL MEDICINE

## 2017-01-01 PROCEDURE — 80053 COMPREHEN METABOLIC PANEL: CPT | Performed by: INTERNAL MEDICINE

## 2017-01-01 PROCEDURE — 25010000002 HALOPERIDOL LACTATE PER 5 MG: Performed by: NURSE PRACTITIONER

## 2017-01-01 PROCEDURE — 80048 BASIC METABOLIC PNL TOTAL CA: CPT | Performed by: INTERNAL MEDICINE

## 2017-01-01 PROCEDURE — 93306 TTE W/DOPPLER COMPLETE: CPT | Performed by: INTERNAL MEDICINE

## 2017-01-01 PROCEDURE — 25010000002 HALOPERIDOL LACTATE PER 5 MG: Performed by: HOSPITALIST

## 2017-01-01 PROCEDURE — 99291 CRITICAL CARE FIRST HOUR: CPT | Performed by: INTERNAL MEDICINE

## 2017-01-01 PROCEDURE — 86140 C-REACTIVE PROTEIN: CPT | Performed by: EMERGENCY MEDICINE

## 2017-01-01 PROCEDURE — 84145 PROCALCITONIN (PCT): CPT | Performed by: NURSE PRACTITIONER

## 2017-01-01 PROCEDURE — 93005 ELECTROCARDIOGRAM TRACING: CPT | Performed by: EMERGENCY MEDICINE

## 2017-01-01 PROCEDURE — 84100 ASSAY OF PHOSPHORUS: CPT | Performed by: NURSE PRACTITIONER

## 2017-01-01 PROCEDURE — 83690 ASSAY OF LIPASE: CPT | Performed by: EMERGENCY MEDICINE

## 2017-01-01 PROCEDURE — 93005 ELECTROCARDIOGRAM TRACING: CPT | Performed by: NURSE PRACTITIONER

## 2017-01-01 PROCEDURE — 99231 SBSQ HOSP IP/OBS SF/LOW 25: CPT | Performed by: NURSE PRACTITIONER

## 2017-01-01 PROCEDURE — 82607 VITAMIN B-12: CPT | Performed by: PSYCHIATRY & NEUROLOGY

## 2017-01-01 PROCEDURE — 84443 ASSAY THYROID STIM HORMONE: CPT | Performed by: INTERNAL MEDICINE

## 2017-01-01 PROCEDURE — 80048 BASIC METABOLIC PNL TOTAL CA: CPT | Performed by: HOSPITALIST

## 2017-01-01 PROCEDURE — 87077 CULTURE AEROBIC IDENTIFY: CPT | Performed by: EMERGENCY MEDICINE

## 2017-01-01 PROCEDURE — 85027 COMPLETE CBC AUTOMATED: CPT | Performed by: INTERNAL MEDICINE

## 2017-01-01 PROCEDURE — 25010000002 PIPERACILLIN-TAZOBACTAM: Performed by: INTERNAL MEDICINE

## 2017-01-01 PROCEDURE — 85027 COMPLETE CBC AUTOMATED: CPT | Performed by: NURSE PRACTITIONER

## 2017-01-01 PROCEDURE — 84132 ASSAY OF SERUM POTASSIUM: CPT | Performed by: INTERNAL MEDICINE

## 2017-01-01 PROCEDURE — 97163 PT EVAL HIGH COMPLEX 45 MIN: CPT

## 2017-01-01 PROCEDURE — 97530 THERAPEUTIC ACTIVITIES: CPT

## 2017-01-01 PROCEDURE — 25010000002 AMIODARONE IN DEXTROSE 5% 360-4.14 MG/200ML-% SOLUTION: Performed by: EMERGENCY MEDICINE

## 2017-01-01 PROCEDURE — 99223 1ST HOSP IP/OBS HIGH 75: CPT | Performed by: PSYCHIATRY & NEUROLOGY

## 2017-01-01 PROCEDURE — 25010000002 MAGNESIUM SULFATE 2 GM/50ML SOLUTION: Performed by: NURSE PRACTITIONER

## 2017-01-01 PROCEDURE — 25010000002 AMIODARONE PER 30 MG: Performed by: EMERGENCY MEDICINE

## 2017-01-01 PROCEDURE — 85025 COMPLETE CBC W/AUTO DIFF WBC: CPT | Performed by: HOSPITALIST

## 2017-01-01 PROCEDURE — 80053 COMPREHEN METABOLIC PANEL: CPT | Performed by: HOSPITALIST

## 2017-01-01 PROCEDURE — 80069 RENAL FUNCTION PANEL: CPT | Performed by: INTERNAL MEDICINE

## 2017-01-01 PROCEDURE — 97166 OT EVAL MOD COMPLEX 45 MIN: CPT

## 2017-01-01 PROCEDURE — 80053 COMPREHEN METABOLIC PANEL: CPT | Performed by: FAMILY MEDICINE

## 2017-01-01 PROCEDURE — 87150 DNA/RNA AMPLIFIED PROBE: CPT | Performed by: EMERGENCY MEDICINE

## 2017-01-01 PROCEDURE — 99233 SBSQ HOSP IP/OBS HIGH 50: CPT | Performed by: HOSPITALIST

## 2017-01-01 PROCEDURE — 25010000002 DAPTOMYCIN PER 1 MG: Performed by: INTERNAL MEDICINE

## 2017-01-01 PROCEDURE — 84484 ASSAY OF TROPONIN QUANT: CPT | Performed by: INTERNAL MEDICINE

## 2017-01-01 PROCEDURE — 85652 RBC SED RATE AUTOMATED: CPT | Performed by: INTERNAL MEDICINE

## 2017-01-01 PROCEDURE — 82947 ASSAY GLUCOSE BLOOD QUANT: CPT | Performed by: INTERNAL MEDICINE

## 2017-01-01 PROCEDURE — 99232 SBSQ HOSP IP/OBS MODERATE 35: CPT | Performed by: HOSPITALIST

## 2017-01-01 PROCEDURE — 85025 COMPLETE CBC W/AUTO DIFF WBC: CPT | Performed by: EMERGENCY MEDICINE

## 2017-01-01 PROCEDURE — 99214 OFFICE O/P EST MOD 30 MIN: CPT | Performed by: INTERNAL MEDICINE

## 2017-01-01 PROCEDURE — 92950 HEART/LUNG RESUSCITATION CPR: CPT

## 2017-01-01 PROCEDURE — 99223 1ST HOSP IP/OBS HIGH 75: CPT | Performed by: INTERNAL MEDICINE

## 2017-01-01 PROCEDURE — 25010000002 PIPERACILLIN-TAZOBACTAM: Performed by: NURSE PRACTITIONER

## 2017-01-01 RX ORDER — OXYBUTYNIN CHLORIDE 5 MG/1
5 TABLET, EXTENDED RELEASE ORAL DAILY
Status: DISCONTINUED | OUTPATIENT
Start: 2017-01-01 | End: 2017-01-01

## 2017-01-01 RX ORDER — LORAZEPAM 2 MG/ML
1 INJECTION INTRAMUSCULAR NIGHTLY
Status: DISCONTINUED | OUTPATIENT
Start: 2017-01-01 | End: 2017-01-01

## 2017-01-01 RX ORDER — PHENOBARBITAL 20 MG/5ML
90 SOLUTION ORAL EVERY 8 HOURS PRN
Status: DISCONTINUED | OUTPATIENT
Start: 2017-01-01 | End: 2017-01-01 | Stop reason: SDUPTHER

## 2017-01-01 RX ORDER — HALOPERIDOL 5 MG/ML
1 INJECTION INTRAMUSCULAR EVERY 12 HOURS
Status: DISCONTINUED | OUTPATIENT
Start: 2017-01-01 | End: 2017-01-01

## 2017-01-01 RX ORDER — MORPHINE SULFATE 100 MG/5ML
2 SOLUTION ORAL NIGHTLY
Status: DISCONTINUED | OUTPATIENT
Start: 2017-01-01 | End: 2017-01-01 | Stop reason: HOSPADM

## 2017-01-01 RX ORDER — TERAZOSIN 1 MG/1
1 CAPSULE ORAL NIGHTLY
Status: DISCONTINUED | OUTPATIENT
Start: 2017-01-01 | End: 2017-01-01 | Stop reason: HOSPADM

## 2017-01-01 RX ORDER — OXYBUTYNIN CHLORIDE 10 MG/1
10 TABLET, EXTENDED RELEASE ORAL DAILY
Status: CANCELLED | OUTPATIENT
Start: 2017-01-01

## 2017-01-01 RX ORDER — MORPHINE SULFATE 2 MG/ML
2 INJECTION, SOLUTION INTRAMUSCULAR; INTRAVENOUS EVERY 8 HOURS
Status: DISCONTINUED | OUTPATIENT
Start: 2017-01-01 | End: 2017-01-01

## 2017-01-01 RX ORDER — QUETIAPINE FUMARATE 25 MG/1
25 TABLET, FILM COATED ORAL DAILY
Status: CANCELLED | OUTPATIENT
Start: 2017-01-01

## 2017-01-01 RX ORDER — MORPHINE SULFATE 1 MG/ML
INJECTION INTRAVENOUS CONTINUOUS
Status: DISCONTINUED | OUTPATIENT
Start: 2017-01-01 | End: 2017-01-01

## 2017-01-01 RX ORDER — FUROSEMIDE 40 MG/1
80 TABLET ORAL ONCE
Status: DISCONTINUED | OUTPATIENT
Start: 2017-01-01 | End: 2017-01-01

## 2017-01-01 RX ORDER — CITALOPRAM 40 MG/1
TABLET ORAL
Qty: 45 TABLET | Refills: 0 | Status: SHIPPED | OUTPATIENT
Start: 2017-01-01 | End: 2017-01-01 | Stop reason: SDUPTHER

## 2017-01-01 RX ORDER — LORAZEPAM 2 MG/ML
1 INJECTION INTRAMUSCULAR EVERY 4 HOURS PRN
Status: DISCONTINUED | OUTPATIENT
Start: 2017-01-01 | End: 2017-01-01 | Stop reason: HOSPADM

## 2017-01-01 RX ORDER — PANTOPRAZOLE SODIUM 40 MG/1
40 TABLET, DELAYED RELEASE ORAL
Status: DISCONTINUED | OUTPATIENT
Start: 2017-01-01 | End: 2017-01-01 | Stop reason: HOSPADM

## 2017-01-01 RX ORDER — CITALOPRAM 40 MG/1
TABLET ORAL
Qty: 45 TABLET | Refills: 0 | Status: SHIPPED | OUTPATIENT
Start: 2017-01-01

## 2017-01-01 RX ORDER — MORPHINE SULFATE 2 MG/ML
2 INJECTION, SOLUTION INTRAMUSCULAR; INTRAVENOUS
Status: DISCONTINUED | OUTPATIENT
Start: 2017-01-01 | End: 2017-01-01 | Stop reason: HOSPADM

## 2017-01-01 RX ORDER — PHENOBARBITAL 20 MG/5ML
90 SOLUTION ORAL EVERY 8 HOURS PRN
Status: DISCONTINUED | OUTPATIENT
Start: 2017-01-01 | End: 2017-01-01

## 2017-01-01 RX ORDER — SODIUM CHLORIDE 0.9 % (FLUSH) 0.9 %
10 SYRINGE (ML) INJECTION AS NEEDED
Status: DISCONTINUED | OUTPATIENT
Start: 2017-01-01 | End: 2017-01-01 | Stop reason: SDUPTHER

## 2017-01-01 RX ORDER — NICOTINE 21 MG/24HR
1 PATCH, TRANSDERMAL 24 HOURS TRANSDERMAL DAILY
Status: DISCONTINUED | OUTPATIENT
Start: 2017-01-01 | End: 2017-01-01

## 2017-01-01 RX ORDER — NICOTINE POLACRILEX 4 MG
15 LOZENGE BUCCAL
Status: DISCONTINUED | OUTPATIENT
Start: 2017-01-01 | End: 2017-01-01 | Stop reason: HOSPADM

## 2017-01-01 RX ORDER — HEPARIN SODIUM 5000 [USP'U]/ML
5000 INJECTION, SOLUTION INTRAVENOUS; SUBCUTANEOUS EVERY 12 HOURS SCHEDULED
Status: DISCONTINUED | OUTPATIENT
Start: 2017-01-01 | End: 2017-01-01 | Stop reason: HOSPADM

## 2017-01-01 RX ORDER — ACETAMINOPHEN 325 MG/1
650 TABLET ORAL EVERY 6 HOURS PRN
Status: DISCONTINUED | OUTPATIENT
Start: 2017-01-01 | End: 2017-01-01 | Stop reason: HOSPADM

## 2017-01-01 RX ORDER — AMIODARONE HYDROCHLORIDE 200 MG/1
TABLET ORAL
Qty: 90 TABLET | Refills: 0 | Status: SHIPPED | OUTPATIENT
Start: 2017-01-01 | End: 2017-01-01 | Stop reason: SDUPTHER

## 2017-01-01 RX ORDER — QUETIAPINE FUMARATE 25 MG/1
25 TABLET, FILM COATED ORAL DAILY
Status: DISCONTINUED | OUTPATIENT
Start: 2017-01-01 | End: 2017-01-01 | Stop reason: HOSPADM

## 2017-01-01 RX ORDER — PHENOBARBITAL SODIUM 65 MG/ML
60 INJECTION INTRAMUSCULAR ONCE
Status: COMPLETED | OUTPATIENT
Start: 2017-01-01 | End: 2017-01-01

## 2017-01-01 RX ORDER — MORPHINE SULFATE 2 MG/ML
2 INJECTION, SOLUTION INTRAMUSCULAR; INTRAVENOUS
Status: DISCONTINUED | OUTPATIENT
Start: 2017-01-01 | End: 2017-01-01 | Stop reason: SDUPTHER

## 2017-01-01 RX ORDER — PHENOBARBITAL SODIUM 65 MG/ML
65 INJECTION INTRAMUSCULAR EVERY 8 HOURS PRN
Status: DISCONTINUED | OUTPATIENT
Start: 2017-01-01 | End: 2017-01-01 | Stop reason: SDUPTHER

## 2017-01-01 RX ORDER — PHENOBARBITAL SODIUM 65 MG/ML
65 INJECTION INTRAMUSCULAR EVERY 6 HOURS PRN
Status: DISCONTINUED | OUTPATIENT
Start: 2017-01-01 | End: 2017-01-01

## 2017-01-01 RX ORDER — LORAZEPAM 1 MG/1
1 TABLET ORAL EVERY 4 HOURS PRN
Status: DISCONTINUED | OUTPATIENT
Start: 2017-01-01 | End: 2017-01-01 | Stop reason: HOSPADM

## 2017-01-01 RX ORDER — QUETIAPINE FUMARATE 25 MG/1
25 TABLET, FILM COATED ORAL NIGHTLY
Status: DISCONTINUED | OUTPATIENT
Start: 2017-01-01 | End: 2017-01-01

## 2017-01-01 RX ORDER — QUETIAPINE FUMARATE 25 MG/1
75 TABLET, FILM COATED ORAL NIGHTLY
Status: CANCELLED | OUTPATIENT
Start: 2017-01-01

## 2017-01-01 RX ORDER — PHENOBARBITAL 20 MG/5ML
90 SOLUTION ORAL EVERY 8 HOURS PRN
Status: DISCONTINUED | OUTPATIENT
Start: 2017-01-01 | End: 2017-01-01 | Stop reason: HOSPADM

## 2017-01-01 RX ORDER — HALOPERIDOL 5 MG/ML
1 INJECTION INTRAMUSCULAR EVERY 6 HOURS
Status: DISCONTINUED | OUTPATIENT
Start: 2017-01-01 | End: 2017-01-01 | Stop reason: HOSPADM

## 2017-01-01 RX ORDER — PANTOPRAZOLE SODIUM 40 MG/1
40 TABLET, DELAYED RELEASE ORAL
Status: CANCELLED | OUTPATIENT
Start: 2017-01-01

## 2017-01-01 RX ORDER — NICOTINE 21 MG/24HR
1 PATCH, TRANSDERMAL 24 HOURS TRANSDERMAL DAILY
Status: CANCELLED | OUTPATIENT
Start: 2017-01-01

## 2017-01-01 RX ORDER — MORPHINE SULFATE 100 MG/5ML
2 SOLUTION ORAL EVERY 4 HOURS PRN
Status: CANCELLED | OUTPATIENT
Start: 2017-01-01 | End: 2017-01-01

## 2017-01-01 RX ORDER — MORPHINE SULFATE 2 MG/ML
2 INJECTION, SOLUTION INTRAMUSCULAR; INTRAVENOUS EVERY 6 HOURS
Status: DISCONTINUED | OUTPATIENT
Start: 2017-01-01 | End: 2017-01-01 | Stop reason: HOSPADM

## 2017-01-01 RX ORDER — BISACODYL 10 MG
10 SUPPOSITORY, RECTAL RECTAL DAILY PRN
Status: DISCONTINUED | OUTPATIENT
Start: 2017-01-01 | End: 2017-01-01 | Stop reason: HOSPADM

## 2017-01-01 RX ORDER — MORPHINE SULFATE 100 MG/5ML
2 SOLUTION ORAL NIGHTLY
Status: DISCONTINUED | OUTPATIENT
Start: 2017-01-01 | End: 2017-01-01

## 2017-01-01 RX ORDER — MORPHINE SULFATE 100 MG/5ML
2 SOLUTION ORAL EVERY 4 HOURS PRN
Status: DISCONTINUED | OUTPATIENT
Start: 2017-01-01 | End: 2017-01-01 | Stop reason: HOSPADM

## 2017-01-01 RX ORDER — QUETIAPINE FUMARATE 25 MG/1
25 TABLET, FILM COATED ORAL ONCE
Status: COMPLETED | OUTPATIENT
Start: 2017-01-01 | End: 2017-01-01

## 2017-01-01 RX ORDER — NALOXONE HCL 0.4 MG/ML
0.1 VIAL (ML) INJECTION
Status: DISCONTINUED | OUTPATIENT
Start: 2017-01-01 | End: 2017-01-01 | Stop reason: HOSPADM

## 2017-01-01 RX ORDER — AMIODARONE HYDROCHLORIDE 200 MG/1
200 TABLET ORAL
Status: DISCONTINUED | OUTPATIENT
Start: 2017-01-01 | End: 2017-01-01

## 2017-01-01 RX ORDER — PHENOBARBITAL SODIUM 65 MG/ML
30 INJECTION INTRAMUSCULAR EVERY 6 HOURS PRN
Status: DISCONTINUED | OUTPATIENT
Start: 2017-01-01 | End: 2017-01-01

## 2017-01-01 RX ORDER — AMIODARONE HYDROCHLORIDE 50 MG/ML
150 INJECTION, SOLUTION INTRAVENOUS ONCE
Status: COMPLETED | OUTPATIENT
Start: 2017-01-01 | End: 2017-01-01

## 2017-01-01 RX ORDER — SODIUM CHLORIDE 0.9 % (FLUSH) 0.9 %
1-10 SYRINGE (ML) INJECTION AS NEEDED
Status: CANCELLED | OUTPATIENT
Start: 2017-01-01

## 2017-01-01 RX ORDER — METOCLOPRAMIDE HYDROCHLORIDE 5 MG/ML
5 INJECTION INTRAMUSCULAR; INTRAVENOUS EVERY 8 HOURS PRN
Status: DISCONTINUED | OUTPATIENT
Start: 2017-01-01 | End: 2017-01-01

## 2017-01-01 RX ORDER — HALOPERIDOL 5 MG/ML
0.5 INJECTION INTRAMUSCULAR EVERY 4 HOURS PRN
Status: DISCONTINUED | OUTPATIENT
Start: 2017-01-01 | End: 2017-01-01

## 2017-01-01 RX ORDER — HEPARIN SODIUM 5000 [USP'U]/ML
5000 INJECTION, SOLUTION INTRAVENOUS; SUBCUTANEOUS EVERY 12 HOURS SCHEDULED
Status: DISCONTINUED | OUTPATIENT
Start: 2017-01-01 | End: 2017-01-01

## 2017-01-01 RX ORDER — QUETIAPINE FUMARATE 25 MG/1
75 TABLET, FILM COATED ORAL NIGHTLY
Status: DISCONTINUED | OUTPATIENT
Start: 2017-01-01 | End: 2017-01-01

## 2017-01-01 RX ORDER — LIDOCAINE HYDROCHLORIDE 10 MG/ML
INJECTION, SOLUTION EPIDURAL; INFILTRATION; INTRACAUDAL; PERINEURAL
Status: COMPLETED
Start: 2017-01-01 | End: 2017-01-01

## 2017-01-01 RX ORDER — GLYCOPYRROLATE 0.2 MG/ML
0.4 INJECTION INTRAMUSCULAR; INTRAVENOUS EVERY 4 HOURS PRN
Status: DISCONTINUED | OUTPATIENT
Start: 2017-01-01 | End: 2017-01-01 | Stop reason: HOSPADM

## 2017-01-01 RX ORDER — POTASSIUM CHLORIDE 750 MG/1
10 CAPSULE, EXTENDED RELEASE ORAL ONCE
Status: COMPLETED | OUTPATIENT
Start: 2017-01-01 | End: 2017-01-01

## 2017-01-01 RX ORDER — ACETAMINOPHEN 500 MG
500 TABLET ORAL EVERY 6 HOURS PRN
COMMUNITY

## 2017-01-01 RX ORDER — MAGNESIUM SULFATE HEPTAHYDRATE 40 MG/ML
2 INJECTION, SOLUTION INTRAVENOUS ONCE
Status: COMPLETED | OUTPATIENT
Start: 2017-01-01 | End: 2017-01-01

## 2017-01-01 RX ORDER — NICOTINE POLACRILEX 4 MG
15 LOZENGE BUCCAL
Status: CANCELLED | OUTPATIENT
Start: 2017-01-01

## 2017-01-01 RX ORDER — AMIODARONE HYDROCHLORIDE 50 MG/ML
150 INJECTION, SOLUTION INTRAVENOUS ONCE
Status: DISCONTINUED | OUTPATIENT
Start: 2017-01-01 | End: 2017-01-01

## 2017-01-01 RX ORDER — DEXTROSE MONOHYDRATE 25 G/50ML
25 INJECTION, SOLUTION INTRAVENOUS
Status: DISCONTINUED | OUTPATIENT
Start: 2017-01-01 | End: 2017-01-01

## 2017-01-01 RX ORDER — TERAZOSIN 1 MG/1
1 CAPSULE ORAL NIGHTLY
Status: DISCONTINUED | OUTPATIENT
Start: 2017-01-01 | End: 2017-01-01

## 2017-01-01 RX ORDER — HALOPERIDOL 5 MG/ML
2 INJECTION INTRAMUSCULAR ONCE
Status: COMPLETED | OUTPATIENT
Start: 2017-01-01 | End: 2017-01-01

## 2017-01-01 RX ORDER — PHENOBARBITAL 20 MG/5ML
90 SOLUTION ORAL EVERY 8 HOURS PRN
Status: CANCELLED | OUTPATIENT
Start: 2017-01-01

## 2017-01-01 RX ORDER — CEFTRIAXONE SODIUM 2 G/50ML
2 INJECTION, SOLUTION INTRAVENOUS ONCE
Status: DISCONTINUED | OUTPATIENT
Start: 2017-01-01 | End: 2017-01-01

## 2017-01-01 RX ORDER — POTASSIUM CHLORIDE 7.45 MG/ML
10 INJECTION INTRAVENOUS
Status: COMPLETED | OUTPATIENT
Start: 2017-01-01 | End: 2017-01-01

## 2017-01-01 RX ORDER — DOCUSATE CALCIUM 240 MG
240 CAPSULE ORAL DAILY
COMMUNITY

## 2017-01-01 RX ORDER — SODIUM CHLORIDE 0.9 % (FLUSH) 0.9 %
1-10 SYRINGE (ML) INJECTION AS NEEDED
Status: DISCONTINUED | OUTPATIENT
Start: 2017-01-01 | End: 2017-01-01 | Stop reason: HOSPADM

## 2017-01-01 RX ORDER — HEPARIN SODIUM 5000 [USP'U]/ML
5000 INJECTION, SOLUTION INTRAVENOUS; SUBCUTANEOUS EVERY 12 HOURS SCHEDULED
Status: CANCELLED | OUTPATIENT
Start: 2017-01-01

## 2017-01-01 RX ORDER — LORAZEPAM 2 MG/ML
0.5 INJECTION INTRAMUSCULAR EVERY 4 HOURS PRN
Status: DISCONTINUED | OUTPATIENT
Start: 2017-01-01 | End: 2017-01-01

## 2017-01-01 RX ORDER — LEVOFLOXACIN 5 MG/ML
750 INJECTION, SOLUTION INTRAVENOUS ONCE
Status: DISCONTINUED | OUTPATIENT
Start: 2017-01-01 | End: 2017-01-01

## 2017-01-01 RX ORDER — MORPHINE SULFATE 100 MG/5ML
2 SOLUTION ORAL NIGHTLY
Status: CANCELLED | OUTPATIENT
Start: 2017-01-01 | End: 2017-01-01

## 2017-01-01 RX ORDER — DOXYCYCLINE HYCLATE 100 MG/1
100 CAPSULE ORAL EVERY 12 HOURS SCHEDULED
Status: DISCONTINUED | OUTPATIENT
Start: 2017-01-01 | End: 2017-01-01

## 2017-01-01 RX ORDER — CEFTRIAXONE SODIUM 1 G/50ML
1 INJECTION, SOLUTION INTRAVENOUS DAILY
Status: DISCONTINUED | OUTPATIENT
Start: 2017-01-01 | End: 2017-01-01

## 2017-01-01 RX ORDER — AMIODARONE HYDROCHLORIDE 200 MG/1
200 TABLET ORAL
Status: DISCONTINUED | OUTPATIENT
Start: 2017-01-01 | End: 2017-01-01 | Stop reason: HOSPADM

## 2017-01-01 RX ORDER — QUETIAPINE FUMARATE 25 MG/1
50 TABLET, FILM COATED ORAL NIGHTLY
Status: DISCONTINUED | OUTPATIENT
Start: 2017-01-01 | End: 2017-01-01

## 2017-01-01 RX ORDER — HALOPERIDOL 5 MG/ML
1 INJECTION INTRAMUSCULAR EVERY 6 HOURS PRN
Status: DISCONTINUED | OUTPATIENT
Start: 2017-01-01 | End: 2017-01-01

## 2017-01-01 RX ORDER — PANTOPRAZOLE SODIUM 40 MG/1
40 TABLET, DELAYED RELEASE ORAL
Status: DISCONTINUED | OUTPATIENT
Start: 2017-01-01 | End: 2017-01-01

## 2017-01-01 RX ORDER — NICOTINE 21 MG/24HR
1 PATCH, TRANSDERMAL 24 HOURS TRANSDERMAL DAILY
Status: DISCONTINUED | OUTPATIENT
Start: 2017-01-01 | End: 2017-01-01 | Stop reason: HOSPADM

## 2017-01-01 RX ORDER — LORAZEPAM 1 MG/1
1 TABLET ORAL EVERY 4 HOURS PRN
Status: CANCELLED | OUTPATIENT
Start: 2017-01-01 | End: 2017-01-01

## 2017-01-01 RX ORDER — LORAZEPAM 2 MG/ML
1 INJECTION INTRAMUSCULAR EVERY 4 HOURS PRN
Status: DISCONTINUED | OUTPATIENT
Start: 2017-01-01 | End: 2017-01-01 | Stop reason: SDUPTHER

## 2017-01-01 RX ORDER — LORAZEPAM 1 MG/1
1 TABLET ORAL EVERY 4 HOURS PRN
Status: DISCONTINUED | OUTPATIENT
Start: 2017-01-01 | End: 2017-01-01 | Stop reason: SDUPTHER

## 2017-01-01 RX ORDER — AMIODARONE HYDROCHLORIDE 200 MG/1
TABLET ORAL
Qty: 90 TABLET | Refills: 0 | Status: SHIPPED | OUTPATIENT
Start: 2017-01-01

## 2017-01-01 RX ORDER — ACETAMINOPHEN 325 MG/1
650 TABLET ORAL EVERY 6 HOURS PRN
Status: CANCELLED | OUTPATIENT
Start: 2017-01-01

## 2017-01-01 RX ORDER — BISACODYL 5 MG/1
10 TABLET, DELAYED RELEASE ORAL DAILY PRN
Status: CANCELLED | OUTPATIENT
Start: 2017-01-01

## 2017-01-01 RX ORDER — LORAZEPAM 2 MG/ML
1 INJECTION INTRAMUSCULAR ONCE
Status: DISCONTINUED | OUTPATIENT
Start: 2017-01-01 | End: 2017-01-01

## 2017-01-01 RX ORDER — LORAZEPAM 1 MG/1
1 TABLET ORAL ONCE
Status: COMPLETED | OUTPATIENT
Start: 2017-01-01 | End: 2017-01-01

## 2017-01-01 RX ORDER — BISACODYL 5 MG/1
10 TABLET, DELAYED RELEASE ORAL DAILY PRN
Status: DISCONTINUED | OUTPATIENT
Start: 2017-01-01 | End: 2017-01-01 | Stop reason: HOSPADM

## 2017-01-01 RX ORDER — HYDROCODONE BITARTRATE AND ACETAMINOPHEN 5; 325 MG/1; MG/1
0.5 TABLET ORAL EVERY 6 HOURS
Status: DISCONTINUED | OUTPATIENT
Start: 2017-01-01 | End: 2017-01-01

## 2017-01-01 RX ORDER — OXYBUTYNIN CHLORIDE 10 MG/1
10 TABLET, EXTENDED RELEASE ORAL DAILY
Status: DISCONTINUED | OUTPATIENT
Start: 2017-01-01 | End: 2017-01-01 | Stop reason: HOSPADM

## 2017-01-01 RX ORDER — PANTOPRAZOLE SODIUM 40 MG/10ML
40 INJECTION, POWDER, LYOPHILIZED, FOR SOLUTION INTRAVENOUS
Status: DISCONTINUED | OUTPATIENT
Start: 2017-01-01 | End: 2017-01-01

## 2017-01-01 RX ORDER — QUETIAPINE FUMARATE 25 MG/1
12.5 TABLET, FILM COATED ORAL ONCE
Status: COMPLETED | OUTPATIENT
Start: 2017-01-01 | End: 2017-01-01

## 2017-01-01 RX ORDER — DOXYCYCLINE HYCLATE 100 MG/1
100 CAPSULE ORAL 2 TIMES DAILY WITH MEALS
Status: COMPLETED | OUTPATIENT
Start: 2017-01-01 | End: 2017-01-01

## 2017-01-01 RX ORDER — ALPRAZOLAM 0.25 MG/1
0.25 TABLET ORAL ONCE
Status: COMPLETED | OUTPATIENT
Start: 2017-01-01 | End: 2017-01-01

## 2017-01-01 RX ORDER — HEPARIN SODIUM 5000 [USP'U]/ML
5000 INJECTION, SOLUTION INTRAVENOUS; SUBCUTANEOUS EVERY 12 HOURS SCHEDULED
Status: DISCONTINUED | OUTPATIENT
Start: 2017-01-01 | End: 2017-01-01 | Stop reason: SDUPTHER

## 2017-01-01 RX ORDER — SODIUM CHLORIDE 0.9 % (FLUSH) 0.9 %
1-10 SYRINGE (ML) INJECTION AS NEEDED
Status: DISCONTINUED | OUTPATIENT
Start: 2017-01-01 | End: 2017-01-01 | Stop reason: SDUPTHER

## 2017-01-01 RX ORDER — TEMAZEPAM 7.5 MG/1
7.5 CAPSULE ORAL ONCE
Status: COMPLETED | OUTPATIENT
Start: 2017-01-01 | End: 2017-01-01

## 2017-01-01 RX ORDER — OXYBUTYNIN CHLORIDE 10 MG/1
10 TABLET, EXTENDED RELEASE ORAL DAILY
Status: DISCONTINUED | OUTPATIENT
Start: 2017-01-01 | End: 2017-01-01

## 2017-01-01 RX ORDER — PHENOBARBITAL SODIUM 65 MG/ML
65 INJECTION INTRAMUSCULAR NIGHTLY
Status: DISCONTINUED | OUTPATIENT
Start: 2017-01-01 | End: 2017-01-01

## 2017-01-01 RX ORDER — PHENOBARBITAL SODIUM 65 MG/ML
65 INJECTION INTRAMUSCULAR EVERY 8 HOURS PRN
Status: DISCONTINUED | OUTPATIENT
Start: 2017-01-01 | End: 2017-01-01

## 2017-01-01 RX ORDER — AMIODARONE HYDROCHLORIDE 200 MG/1
200 TABLET ORAL
Status: CANCELLED | OUTPATIENT
Start: 2017-01-01

## 2017-01-01 RX ORDER — QUETIAPINE FUMARATE 25 MG/1
75 TABLET, FILM COATED ORAL NIGHTLY
Status: DISCONTINUED | OUTPATIENT
Start: 2017-01-01 | End: 2017-01-01 | Stop reason: HOSPADM

## 2017-01-01 RX ORDER — FUROSEMIDE 40 MG/1
20 TABLET ORAL DAILY
Qty: 30 TABLET | Refills: 5 | Status: SHIPPED | OUTPATIENT
Start: 2017-01-01 | End: 2017-01-01

## 2017-01-01 RX ORDER — OLANZAPINE 10 MG/1
5 INJECTION, POWDER, LYOPHILIZED, FOR SOLUTION INTRAMUSCULAR ONCE
Status: COMPLETED | OUTPATIENT
Start: 2017-01-01 | End: 2017-01-01

## 2017-01-01 RX ORDER — LORAZEPAM 2 MG/ML
1 INJECTION INTRAMUSCULAR EVERY 8 HOURS
Status: DISCONTINUED | OUTPATIENT
Start: 2017-01-01 | End: 2017-01-01 | Stop reason: HOSPADM

## 2017-01-01 RX ORDER — TERAZOSIN 1 MG/1
1 CAPSULE ORAL NIGHTLY
Status: CANCELLED | OUTPATIENT
Start: 2017-01-01

## 2017-01-01 RX ORDER — HALOPERIDOL 5 MG/ML
2 INJECTION INTRAMUSCULAR EVERY 4 HOURS PRN
Status: DISCONTINUED | OUTPATIENT
Start: 2017-01-01 | End: 2017-01-01 | Stop reason: HOSPADM

## 2017-01-01 RX ORDER — MORPHINE SULFATE 1 MG/ML
INJECTION INTRAVENOUS CONTINUOUS
Status: DISCONTINUED | OUTPATIENT
Start: 2017-01-01 | End: 2017-01-01 | Stop reason: HOSPADM

## 2017-01-01 RX ORDER — QUETIAPINE FUMARATE 25 MG/1
25 TABLET, FILM COATED ORAL DAILY
Status: DISCONTINUED | OUTPATIENT
Start: 2017-01-01 | End: 2017-01-01

## 2017-01-01 RX ORDER — GLYCOPYRROLATE 0.2 MG/ML
0.2 INJECTION INTRAMUSCULAR; INTRAVENOUS EVERY 6 HOURS SCHEDULED
Status: DISCONTINUED | OUTPATIENT
Start: 2017-01-01 | End: 2017-01-01 | Stop reason: HOSPADM

## 2017-01-01 RX ORDER — CEFTRIAXONE 1 G/1
1 INJECTION, POWDER, FOR SOLUTION INTRAMUSCULAR; INTRAVENOUS EVERY 24 HOURS
Status: COMPLETED | OUTPATIENT
Start: 2017-01-01 | End: 2017-01-01

## 2017-01-01 RX ADMIN — HALOPERIDOL LACTATE 1 MG: 5 INJECTION, SOLUTION INTRAMUSCULAR at 20:20

## 2017-01-01 RX ADMIN — PANTOPRAZOLE SODIUM 40 MG: 40 TABLET, DELAYED RELEASE ORAL at 09:19

## 2017-01-01 RX ADMIN — Medication 5 MG: at 21:15

## 2017-01-01 RX ADMIN — CEFTRIAXONE 1 G: 1 INJECTION, POWDER, FOR SOLUTION INTRAMUSCULAR; INTRAVENOUS at 12:15

## 2017-01-01 RX ADMIN — LORAZEPAM 1 MG: 1 TABLET ORAL at 22:54

## 2017-01-01 RX ADMIN — GLYCOPYRROLATE 0.2 MG: 0.2 INJECTION, SOLUTION INTRAMUSCULAR; INTRAVENOUS at 05:16

## 2017-01-01 RX ADMIN — QUETIAPINE FUMARATE 50 MG: 25 TABLET, FILM COATED ORAL at 20:59

## 2017-01-01 RX ADMIN — PANTOPRAZOLE SODIUM 40 MG: 40 TABLET, DELAYED RELEASE ORAL at 05:29

## 2017-01-01 RX ADMIN — AMIODARONE HYDROCHLORIDE 0.5 MG/MIN: 1.8 INJECTION, SOLUTION INTRAVENOUS at 05:08

## 2017-01-01 RX ADMIN — MORPHINE SULFATE 2 MG: 100 SOLUTION ORAL at 01:21

## 2017-01-01 RX ADMIN — INSULIN LISPRO 2 UNITS: 100 INJECTION, SOLUTION INTRAVENOUS; SUBCUTANEOUS at 21:58

## 2017-01-01 RX ADMIN — HEPARIN SODIUM 5000 UNITS: 5000 INJECTION, SOLUTION INTRAVENOUS; SUBCUTANEOUS at 21:56

## 2017-01-01 RX ADMIN — TERAZOSIN HYDROCHLORIDE ANHYDROUS 1 MG: 1 CAPSULE ORAL at 21:11

## 2017-01-01 RX ADMIN — CEFTRIAXONE SODIUM 1 G: 1 INJECTION, SOLUTION INTRAVENOUS at 08:30

## 2017-01-01 RX ADMIN — PANTOPRAZOLE SODIUM 40 MG: 40 TABLET, DELAYED RELEASE ORAL at 05:08

## 2017-01-01 RX ADMIN — PHENOBARBITAL SODIUM 65 MG: 65 INJECTION INTRAMUSCULAR; INTRAVENOUS at 20:14

## 2017-01-01 RX ADMIN — TERAZOSIN HYDROCHLORIDE ANHYDROUS 1 MG: 1 CAPSULE ORAL at 01:20

## 2017-01-01 RX ADMIN — MORPHINE SULFATE 2 MG: 100 SOLUTION ORAL at 20:44

## 2017-01-01 RX ADMIN — DOXYCYCLINE HYCLATE 100 MG: 100 CAPSULE ORAL at 17:23

## 2017-01-01 RX ADMIN — PANTOPRAZOLE SODIUM 40 MG: 40 INJECTION, POWDER, FOR SOLUTION INTRAVENOUS at 05:53

## 2017-01-01 RX ADMIN — SERTRALINE 50 MG: 50 TABLET, FILM COATED ORAL at 08:14

## 2017-01-01 RX ADMIN — QUETIAPINE FUMARATE 75 MG: 25 TABLET, FILM COATED ORAL at 20:15

## 2017-01-01 RX ADMIN — OXYBUTYNIN CHLORIDE 10 MG: 10 TABLET, EXTENDED RELEASE ORAL at 13:59

## 2017-01-01 RX ADMIN — AMPICILLIN 250 MG: 250 CAPSULE ORAL at 17:45

## 2017-01-01 RX ADMIN — PIPERACILLIN AND TAZOBACTAM 2.25 G: 2; .25 INJECTION, POWDER, LYOPHILIZED, FOR SOLUTION INTRAVENOUS; PARENTERAL at 21:38

## 2017-01-01 RX ADMIN — MORPHINE SULFATE 2 MG: 100 SOLUTION ORAL at 01:03

## 2017-01-01 RX ADMIN — AMPICILLIN 250 MG: 250 CAPSULE ORAL at 17:05

## 2017-01-01 RX ADMIN — AMPICILLIN 250 MG: 250 CAPSULE ORAL at 11:16

## 2017-01-01 RX ADMIN — LORAZEPAM 1 MG: 2 INJECTION INTRAMUSCULAR; INTRAVENOUS at 11:04

## 2017-01-01 RX ADMIN — AMPICILLIN 250 MG: 250 CAPSULE ORAL at 00:13

## 2017-01-01 RX ADMIN — OLANZAPINE 5 MG: 10 INJECTION, POWDER, FOR SOLUTION INTRAMUSCULAR at 01:33

## 2017-01-01 RX ADMIN — HALOPERIDOL LACTATE 1 MG: 5 INJECTION, SOLUTION INTRAMUSCULAR at 08:54

## 2017-01-01 RX ADMIN — LORAZEPAM 1 MG: 1 TABLET ORAL at 23:11

## 2017-01-01 RX ADMIN — OXYBUTYNIN CHLORIDE 10 MG: 10 TABLET, EXTENDED RELEASE ORAL at 09:52

## 2017-01-01 RX ADMIN — AMPICILLIN 250 MG: 250 CAPSULE ORAL at 17:23

## 2017-01-01 RX ADMIN — DOXYCYCLINE HYCLATE 100 MG: 100 CAPSULE ORAL at 08:14

## 2017-01-01 RX ADMIN — INSULIN HUMAN 3 UNITS: 100 INJECTION, SOLUTION PARENTERAL at 00:06

## 2017-01-01 RX ADMIN — ALPRAZOLAM 0.25 MG: 0.25 TABLET ORAL at 23:42

## 2017-01-01 RX ADMIN — HALOPERIDOL LACTATE 2 MG: 5 INJECTION, SOLUTION INTRAMUSCULAR at 18:22

## 2017-01-01 RX ADMIN — MORPHINE SULFATE 2 MG: 2 INJECTION, SOLUTION INTRAMUSCULAR; INTRAVENOUS at 13:57

## 2017-01-01 RX ADMIN — AMPICILLIN 250 MG: 250 CAPSULE ORAL at 16:38

## 2017-01-01 RX ADMIN — Medication 5 MG: at 21:34

## 2017-01-01 RX ADMIN — AMPICILLIN 250 MG: 250 CAPSULE ORAL at 08:48

## 2017-01-01 RX ADMIN — PHENOBARBITAL SODIUM 65 MG: 65 INJECTION INTRAMUSCULAR; INTRAVENOUS at 14:07

## 2017-01-01 RX ADMIN — LORAZEPAM 1 MG: 2 INJECTION INTRAMUSCULAR; INTRAVENOUS at 20:28

## 2017-01-01 RX ADMIN — QUETIAPINE FUMARATE 25 MG: 25 TABLET, FILM COATED ORAL at 12:15

## 2017-01-01 RX ADMIN — Medication 5 MG: at 20:45

## 2017-01-01 RX ADMIN — LORAZEPAM 1 MG: 2 INJECTION INTRAMUSCULAR; INTRAVENOUS at 11:28

## 2017-01-01 RX ADMIN — LORAZEPAM 1 MG: 2 INJECTION INTRAMUSCULAR; INTRAVENOUS at 23:34

## 2017-01-01 RX ADMIN — AMIODARONE HYDROCHLORIDE 0.5 MG/MIN: 1.8 INJECTION, SOLUTION INTRAVENOUS at 17:13

## 2017-01-01 RX ADMIN — AMPICILLIN 250 MG: 250 CAPSULE ORAL at 21:55

## 2017-01-01 RX ADMIN — Medication: at 21:35

## 2017-01-01 RX ADMIN — CEFTRIAXONE SODIUM 1 G: 1 INJECTION, SOLUTION INTRAVENOUS at 08:39

## 2017-01-01 RX ADMIN — MORPHINE SULFATE 2 MG: 100 SOLUTION ORAL at 20:14

## 2017-01-01 RX ADMIN — HEPARIN SODIUM 5000 UNITS: 5000 INJECTION, SOLUTION INTRAVENOUS; SUBCUTANEOUS at 08:30

## 2017-01-01 RX ADMIN — HALOPERIDOL LACTATE 1 MG: 5 INJECTION, SOLUTION INTRAMUSCULAR at 20:32

## 2017-01-01 RX ADMIN — MORPHINE SULFATE 2 MG: 2 INJECTION, SOLUTION INTRAMUSCULAR; INTRAVENOUS at 18:00

## 2017-01-01 RX ADMIN — AMIODARONE HYDROCHLORIDE 200 MG: 200 TABLET ORAL at 08:30

## 2017-01-01 RX ADMIN — DOXYCYCLINE 100 MG: 100 INJECTION, POWDER, LYOPHILIZED, FOR SOLUTION INTRAVENOUS at 23:47

## 2017-01-01 RX ADMIN — AMIODARONE HYDROCHLORIDE 200 MG: 200 TABLET ORAL at 10:43

## 2017-01-01 RX ADMIN — SERTRALINE 50 MG: 50 TABLET, FILM COATED ORAL at 08:30

## 2017-01-01 RX ADMIN — MORPHINE SULFATE 2 MG: 100 SOLUTION ORAL at 18:43

## 2017-01-01 RX ADMIN — HEPARIN SODIUM 5000 UNITS: 5000 INJECTION, SOLUTION INTRAVENOUS; SUBCUTANEOUS at 08:40

## 2017-01-01 RX ADMIN — TERAZOSIN HYDROCHLORIDE ANHYDROUS 1 MG: 1 CAPSULE ORAL at 21:15

## 2017-01-01 RX ADMIN — QUETIAPINE FUMARATE 75 MG: 25 TABLET, FILM COATED ORAL at 21:34

## 2017-01-01 RX ADMIN — LORAZEPAM 1 MG: 2 INJECTION INTRAMUSCULAR; INTRAVENOUS at 20:33

## 2017-01-01 RX ADMIN — MORPHINE SULFATE 2 MG: 2 INJECTION, SOLUTION INTRAMUSCULAR; INTRAVENOUS at 12:45

## 2017-01-01 RX ADMIN — OXYBUTYNIN CHLORIDE 5 MG: 5 TABLET, EXTENDED RELEASE ORAL at 00:05

## 2017-01-01 RX ADMIN — HEPARIN SODIUM 5000 UNITS: 5000 INJECTION, SOLUTION INTRAVENOUS; SUBCUTANEOUS at 08:52

## 2017-01-01 RX ADMIN — Medication 5 MG: at 20:14

## 2017-01-01 RX ADMIN — MORPHINE SULFATE 2 MG: 2 INJECTION, SOLUTION INTRAMUSCULAR; INTRAVENOUS at 10:16

## 2017-01-01 RX ADMIN — Medication 5 MG: at 00:33

## 2017-01-01 RX ADMIN — DOXYCYCLINE HYCLATE 100 MG: 100 CAPSULE ORAL at 18:50

## 2017-01-01 RX ADMIN — TERAZOSIN HYDROCHLORIDE ANHYDROUS 1 MG: 1 CAPSULE ORAL at 21:34

## 2017-01-01 RX ADMIN — AMPICILLIN 250 MG: 250 CAPSULE ORAL at 06:03

## 2017-01-01 RX ADMIN — PANTOPRAZOLE SODIUM 40 MG: 40 TABLET, DELAYED RELEASE ORAL at 05:51

## 2017-01-01 RX ADMIN — HALOPERIDOL LACTATE 1 MG: 5 INJECTION, SOLUTION INTRAMUSCULAR at 03:05

## 2017-01-01 RX ADMIN — INSULIN LISPRO 2 UNITS: 100 INJECTION, SOLUTION INTRAVENOUS; SUBCUTANEOUS at 08:39

## 2017-01-01 RX ADMIN — LORAZEPAM 1 MG: 1 TABLET ORAL at 00:30

## 2017-01-01 RX ADMIN — MORPHINE SULFATE 2 MG: 2 INJECTION, SOLUTION INTRAMUSCULAR; INTRAVENOUS at 00:53

## 2017-01-01 RX ADMIN — TERAZOSIN HYDROCHLORIDE ANHYDROUS 1 MG: 1 CAPSULE ORAL at 20:48

## 2017-01-01 RX ADMIN — HEPARIN SODIUM 5000 UNITS: 5000 INJECTION, SOLUTION INTRAVENOUS; SUBCUTANEOUS at 08:38

## 2017-01-01 RX ADMIN — NICOTINE 1 PATCH: 14 PATCH, EXTENDED RELEASE TRANSDERMAL at 09:52

## 2017-01-01 RX ADMIN — MORPHINE SULFATE 2 MG: 100 SOLUTION ORAL at 04:56

## 2017-01-01 RX ADMIN — MORPHINE SULFATE 2 MG: 2 INJECTION, SOLUTION INTRAMUSCULAR; INTRAVENOUS at 08:02

## 2017-01-01 RX ADMIN — AMPICILLIN 250 MG: 250 CAPSULE ORAL at 11:54

## 2017-01-01 RX ADMIN — HYDROCODONE BITARTRATE AND ACETAMINOPHEN 0.5 TABLET: 5; 325 TABLET ORAL at 17:27

## 2017-01-01 RX ADMIN — MORPHINE SULFATE 2 MG: 100 SOLUTION ORAL at 21:33

## 2017-01-01 RX ADMIN — HALOPERIDOL LACTATE 1 MG: 5 INJECTION, SOLUTION INTRAMUSCULAR at 02:10

## 2017-01-01 RX ADMIN — TEMAZEPAM 7.5 MG: 7.5 CAPSULE ORAL at 23:45

## 2017-01-01 RX ADMIN — PHENOBARBITAL SODIUM 65 MG: 65 INJECTION INTRAMUSCULAR; INTRAVENOUS at 20:44

## 2017-01-01 RX ADMIN — MORPHINE SULFATE 2 MG: 100 SOLUTION ORAL at 21:46

## 2017-01-01 RX ADMIN — AMIODARONE HYDROCHLORIDE 200 MG: 200 TABLET ORAL at 08:40

## 2017-01-01 RX ADMIN — PHENOBARBITAL SODIUM 30 MG: 65 INJECTION INTRAMUSCULAR; INTRAVENOUS at 21:58

## 2017-01-01 RX ADMIN — MORPHINE SULFATE 2 MG: 100 SOLUTION ORAL at 21:35

## 2017-01-01 RX ADMIN — Medication 5 MG: at 23:13

## 2017-01-01 RX ADMIN — NICOTINE 1 PATCH: 14 PATCH TRANSDERMAL at 09:12

## 2017-01-01 RX ADMIN — HEPARIN SODIUM 5000 UNITS: 5000 INJECTION, SOLUTION INTRAVENOUS; SUBCUTANEOUS at 21:37

## 2017-01-01 RX ADMIN — MORPHINE SULFATE 2 MG: 2 INJECTION, SOLUTION INTRAMUSCULAR; INTRAVENOUS at 01:34

## 2017-01-01 RX ADMIN — TAZOBACTAM SODIUM AND PIPERACILLIN SODIUM 4.5 G: .5; 4 INJECTION, POWDER, LYOPHILIZED, FOR SOLUTION INTRAVENOUS at 23:47

## 2017-01-01 RX ADMIN — QUETIAPINE FUMARATE 12.5 MG: 25 TABLET, FILM COATED ORAL at 10:51

## 2017-01-01 RX ADMIN — AMIODARONE HYDROCHLORIDE 1 MG/MIN: 1.8 INJECTION, SOLUTION INTRAVENOUS at 21:47

## 2017-01-01 RX ADMIN — TERAZOSIN HYDROCHLORIDE ANHYDROUS 1 MG: 1 CAPSULE ORAL at 21:55

## 2017-01-01 RX ADMIN — LORAZEPAM 1 MG: 1 TABLET ORAL at 16:42

## 2017-01-01 RX ADMIN — MORPHINE SULFATE 2 MG: 2 INJECTION, SOLUTION INTRAMUSCULAR; INTRAVENOUS at 04:45

## 2017-01-01 RX ADMIN — BISACODYL 10 MG: 5 TABLET, COATED ORAL at 12:07

## 2017-01-01 RX ADMIN — MORPHINE SULFATE 2 MG: 100 SOLUTION ORAL at 20:20

## 2017-01-01 RX ADMIN — NICOTINE 1 PATCH: 14 PATCH, EXTENDED RELEASE TRANSDERMAL at 08:43

## 2017-01-01 RX ADMIN — TERAZOSIN HYDROCHLORIDE ANHYDROUS 1 MG: 1 CAPSULE ORAL at 23:20

## 2017-01-01 RX ADMIN — PHENOBARBITAL 80 MG: 20 LIQUID ORAL at 18:41

## 2017-01-01 RX ADMIN — GLYCOPYRROLATE 0.2 MG: 0.2 INJECTION, SOLUTION INTRAMUSCULAR; INTRAVENOUS at 00:14

## 2017-01-01 RX ADMIN — QUETIAPINE FUMARATE 50 MG: 25 TABLET, FILM COATED ORAL at 20:54

## 2017-01-01 RX ADMIN — DOXYCYCLINE HYCLATE 100 MG: 100 CAPSULE ORAL at 08:30

## 2017-01-01 RX ADMIN — AMPICILLIN 250 MG: 250 CAPSULE ORAL at 14:00

## 2017-01-01 RX ADMIN — CEFTRIAXONE 1 G: 1 INJECTION, POWDER, FOR SOLUTION INTRAMUSCULAR; INTRAVENOUS at 10:49

## 2017-01-01 RX ADMIN — AMIODARONE HYDROCHLORIDE 200 MG: 200 TABLET ORAL at 09:19

## 2017-01-01 RX ADMIN — MORPHINE SULFATE 2 MG: 100 SOLUTION ORAL at 05:00

## 2017-01-01 RX ADMIN — SERTRALINE 50 MG: 50 TABLET, FILM COATED ORAL at 08:51

## 2017-01-01 RX ADMIN — AMIODARONE HYDROCHLORIDE 0.5 MG/MIN: 1.8 INJECTION, SOLUTION INTRAVENOUS at 19:17

## 2017-01-01 RX ADMIN — AMPICILLIN 250 MG: 250 CAPSULE ORAL at 23:40

## 2017-01-01 RX ADMIN — AMIODARONE HYDROCHLORIDE 200 MG: 200 TABLET ORAL at 08:01

## 2017-01-01 RX ADMIN — NICOTINE 1 PATCH: 14 PATCH TRANSDERMAL at 09:20

## 2017-01-01 RX ADMIN — TERAZOSIN HYDROCHLORIDE ANHYDROUS 1 MG: 1 CAPSULE ORAL at 20:52

## 2017-01-01 RX ADMIN — DOXYCYCLINE HYCLATE 100 MG: 100 CAPSULE ORAL at 08:39

## 2017-01-01 RX ADMIN — HALOPERIDOL LACTATE 1 MG: 5 INJECTION, SOLUTION INTRAMUSCULAR at 08:23

## 2017-01-01 RX ADMIN — LORAZEPAM 1 MG: 1 TABLET ORAL at 12:15

## 2017-01-01 RX ADMIN — QUETIAPINE FUMARATE 75 MG: 25 TABLET, FILM COATED ORAL at 00:34

## 2017-01-01 RX ADMIN — DOXYCYCLINE HYCLATE 100 MG: 100 CAPSULE ORAL at 17:55

## 2017-01-01 RX ADMIN — MORPHINE SULFATE 2 MG: 2 INJECTION, SOLUTION INTRAMUSCULAR; INTRAVENOUS at 18:09

## 2017-01-01 RX ADMIN — MORPHINE SULFATE 2 MG: 2 INJECTION, SOLUTION INTRAMUSCULAR; INTRAVENOUS at 16:20

## 2017-01-01 RX ADMIN — HALOPERIDOL LACTATE 1 MG: 5 INJECTION, SOLUTION INTRAMUSCULAR at 14:12

## 2017-01-01 RX ADMIN — MORPHINE SULFATE 2 MG: 2 INJECTION, SOLUTION INTRAMUSCULAR; INTRAVENOUS at 03:39

## 2017-01-01 RX ADMIN — DAPTOMYCIN 300 MG: 500 INJECTION, POWDER, LYOPHILIZED, FOR SOLUTION INTRAVENOUS at 13:11

## 2017-01-01 RX ADMIN — LORAZEPAM 1 MG: 2 INJECTION INTRAMUSCULAR; INTRAVENOUS at 11:24

## 2017-01-01 RX ADMIN — HALOPERIDOL LACTATE 1 MG: 5 INJECTION, SOLUTION INTRAMUSCULAR at 21:01

## 2017-01-01 RX ADMIN — DOXYCYCLINE HYCLATE 100 MG: 100 CAPSULE ORAL at 08:52

## 2017-01-01 RX ADMIN — LORAZEPAM 1 MG: 2 INJECTION INTRAMUSCULAR; INTRAVENOUS at 02:25

## 2017-01-01 RX ADMIN — NICOTINE 1 PATCH: 14 PATCH, EXTENDED RELEASE TRANSDERMAL at 11:25

## 2017-01-01 RX ADMIN — AMPICILLIN 250 MG: 250 CAPSULE ORAL at 05:26

## 2017-01-01 RX ADMIN — HEPARIN SODIUM 5000 UNITS: 5000 INJECTION, SOLUTION INTRAVENOUS; SUBCUTANEOUS at 20:48

## 2017-01-01 RX ADMIN — MORPHINE SULFATE 2 MG: 2 INJECTION, SOLUTION INTRAMUSCULAR; INTRAVENOUS at 09:11

## 2017-01-01 RX ADMIN — MORPHINE SULFATE 2 MG: 2 INJECTION, SOLUTION INTRAMUSCULAR; INTRAVENOUS at 22:57

## 2017-01-01 RX ADMIN — HALOPERIDOL LACTATE 1 MG: 5 INJECTION, SOLUTION INTRAMUSCULAR at 15:46

## 2017-01-01 RX ADMIN — PHENOBARBITAL SODIUM 65 MG: 65 INJECTION INTRAMUSCULAR; INTRAVENOUS at 20:20

## 2017-01-01 RX ADMIN — MORPHINE SULFATE 2 MG: 2 INJECTION, SOLUTION INTRAMUSCULAR; INTRAVENOUS at 06:36

## 2017-01-01 RX ADMIN — MAGNESIUM SULFATE HEPTAHYDRATE 2 G: 40 INJECTION, SOLUTION INTRAVENOUS at 04:04

## 2017-01-01 RX ADMIN — PIPERACILLIN AND TAZOBACTAM 2.25 G: 2; .25 INJECTION, POWDER, LYOPHILIZED, FOR SOLUTION INTRAVENOUS; PARENTERAL at 06:46

## 2017-01-01 RX ADMIN — HALOPERIDOL LACTATE 1 MG: 5 INJECTION, SOLUTION INTRAMUSCULAR at 14:02

## 2017-01-01 RX ADMIN — AMPICILLIN 250 MG: 250 CAPSULE ORAL at 12:06

## 2017-01-01 RX ADMIN — AMIODARONE HYDROCHLORIDE 200 MG: 200 TABLET ORAL at 08:38

## 2017-01-01 RX ADMIN — AMPICILLIN 250 MG: 250 CAPSULE ORAL at 10:43

## 2017-01-01 RX ADMIN — AMIODARONE HYDROCHLORIDE 200 MG: 200 TABLET ORAL at 14:00

## 2017-01-01 RX ADMIN — PANTOPRAZOLE SODIUM 40 MG: 40 TABLET, DELAYED RELEASE ORAL at 05:26

## 2017-01-01 RX ADMIN — CEFTRIAXONE 1 G: 1 INJECTION, POWDER, FOR SOLUTION INTRAMUSCULAR; INTRAVENOUS at 12:11

## 2017-01-01 RX ADMIN — INSULIN LISPRO 2 UNITS: 100 INJECTION, SOLUTION INTRAVENOUS; SUBCUTANEOUS at 21:22

## 2017-01-01 RX ADMIN — HYDROCODONE BITARTRATE AND ACETAMINOPHEN 0.5 TABLET: 5; 325 TABLET ORAL at 12:26

## 2017-01-01 RX ADMIN — SERTRALINE 50 MG: 50 TABLET, FILM COATED ORAL at 08:01

## 2017-01-01 RX ADMIN — INSULIN LISPRO 2 UNITS: 100 INJECTION, SOLUTION INTRAVENOUS; SUBCUTANEOUS at 17:55

## 2017-01-01 RX ADMIN — OXYBUTYNIN CHLORIDE 10 MG: 10 TABLET, EXTENDED RELEASE ORAL at 08:38

## 2017-01-01 RX ADMIN — PIPERACILLIN AND TAZOBACTAM 2.25 G: 2; .25 INJECTION, POWDER, LYOPHILIZED, FOR SOLUTION INTRAVENOUS; PARENTERAL at 11:36

## 2017-01-01 RX ADMIN — AMIODARONE HYDROCHLORIDE 200 MG: 200 TABLET ORAL at 11:26

## 2017-01-01 RX ADMIN — LORAZEPAM 1 MG: 2 INJECTION INTRAMUSCULAR; INTRAVENOUS at 12:38

## 2017-01-01 RX ADMIN — PHENOBARBITAL SODIUM 65 MG: 65 INJECTION INTRAMUSCULAR; INTRAVENOUS at 22:26

## 2017-01-01 RX ADMIN — MORPHINE SULFATE 2 MG: 2 INJECTION, SOLUTION INTRAMUSCULAR; INTRAVENOUS at 12:38

## 2017-01-01 RX ADMIN — INSULIN LISPRO 2 UNITS: 100 INJECTION, SOLUTION INTRAVENOUS; SUBCUTANEOUS at 21:07

## 2017-01-01 RX ADMIN — MORPHINE SULFATE 2 MG: 2 INJECTION, SOLUTION INTRAMUSCULAR; INTRAVENOUS at 11:04

## 2017-01-01 RX ADMIN — AMIODARONE HYDROCHLORIDE 0.5 MG/MIN: 1.8 INJECTION, SOLUTION INTRAVENOUS at 05:53

## 2017-01-01 RX ADMIN — HALOPERIDOL LACTATE 2 MG: 5 INJECTION, SOLUTION INTRAMUSCULAR at 09:52

## 2017-01-01 RX ADMIN — AMPICILLIN 250 MG: 250 CAPSULE ORAL at 17:31

## 2017-01-01 RX ADMIN — HALOPERIDOL LACTATE 1 MG: 5 INJECTION, SOLUTION INTRAMUSCULAR at 02:47

## 2017-01-01 RX ADMIN — HALOPERIDOL LACTATE 1 MG: 5 INJECTION, SOLUTION INTRAMUSCULAR at 08:20

## 2017-01-01 RX ADMIN — SERTRALINE 50 MG: 50 TABLET, FILM COATED ORAL at 12:21

## 2017-01-01 RX ADMIN — Medication: at 05:16

## 2017-01-01 RX ADMIN — AMPICILLIN 250 MG: 250 CAPSULE ORAL at 17:28

## 2017-01-01 RX ADMIN — QUETIAPINE FUMARATE 25 MG: 25 TABLET, FILM COATED ORAL at 08:43

## 2017-01-01 RX ADMIN — OXYBUTYNIN CHLORIDE 10 MG: 10 TABLET, EXTENDED RELEASE ORAL at 09:12

## 2017-01-01 RX ADMIN — AMPICILLIN 250 MG: 250 CAPSULE ORAL at 18:31

## 2017-01-01 RX ADMIN — MORPHINE SULFATE 2 MG: 100 SOLUTION ORAL at 20:48

## 2017-01-01 RX ADMIN — LORAZEPAM 1 MG: 1 TABLET ORAL at 01:02

## 2017-01-01 RX ADMIN — MORPHINE SULFATE 2 MG: 2 INJECTION, SOLUTION INTRAMUSCULAR; INTRAVENOUS at 17:24

## 2017-01-01 RX ADMIN — SERTRALINE HYDROCHLORIDE 50 MG: 50 TABLET ORAL at 08:43

## 2017-01-01 RX ADMIN — GLYCOPYRROLATE 0.4 MG: 0.2 INJECTION, SOLUTION INTRAMUSCULAR; INTRAVENOUS at 21:35

## 2017-01-01 RX ADMIN — PANTOPRAZOLE SODIUM 40 MG: 40 TABLET, DELAYED RELEASE ORAL at 06:55

## 2017-01-01 RX ADMIN — HEPARIN SODIUM 5000 UNITS: 5000 INJECTION, SOLUTION INTRAVENOUS; SUBCUTANEOUS at 08:01

## 2017-01-01 RX ADMIN — MORPHINE SULFATE 2 MG: 100 SOLUTION ORAL at 00:30

## 2017-01-01 RX ADMIN — Medication 5 MG: at 01:21

## 2017-01-01 RX ADMIN — OXYBUTYNIN CHLORIDE 10 MG: 10 TABLET, EXTENDED RELEASE ORAL at 10:34

## 2017-01-01 RX ADMIN — NICOTINE 1 PATCH: 14 PATCH, EXTENDED RELEASE TRANSDERMAL at 11:26

## 2017-01-01 RX ADMIN — LORAZEPAM 1 MG: 2 INJECTION INTRAMUSCULAR; INTRAVENOUS at 00:34

## 2017-01-01 RX ADMIN — HEPARIN SODIUM 5000 UNITS: 5000 INJECTION, SOLUTION INTRAVENOUS; SUBCUTANEOUS at 12:11

## 2017-01-01 RX ADMIN — QUETIAPINE FUMARATE 12.5 MG: 25 TABLET, FILM COATED ORAL at 08:40

## 2017-01-01 RX ADMIN — SERTRALINE HYDROCHLORIDE 50 MG: 50 TABLET ORAL at 11:26

## 2017-01-01 RX ADMIN — NICOTINE 1 PATCH: 14 PATCH, EXTENDED RELEASE TRANSDERMAL at 10:36

## 2017-01-01 RX ADMIN — INSULIN LISPRO 2 UNITS: 100 INJECTION, SOLUTION INTRAVENOUS; SUBCUTANEOUS at 09:09

## 2017-01-01 RX ADMIN — HALOPERIDOL LACTATE 2 MG: 5 INJECTION, SOLUTION INTRAMUSCULAR at 12:52

## 2017-01-01 RX ADMIN — QUETIAPINE FUMARATE 25 MG: 25 TABLET, FILM COATED ORAL at 21:15

## 2017-01-01 RX ADMIN — MORPHINE SULFATE 2 MG: 2 INJECTION, SOLUTION INTRAMUSCULAR; INTRAVENOUS at 17:09

## 2017-01-01 RX ADMIN — HEPARIN SODIUM 5000 UNITS: 5000 INJECTION, SOLUTION INTRAVENOUS; SUBCUTANEOUS at 20:04

## 2017-01-01 RX ADMIN — NICOTINE 1 PATCH: 14 PATCH, EXTENDED RELEASE TRANSDERMAL at 10:42

## 2017-01-01 RX ADMIN — Medication 5 MG: at 21:11

## 2017-01-01 RX ADMIN — MORPHINE SULFATE 2 MG: 100 SOLUTION ORAL at 16:42

## 2017-01-01 RX ADMIN — LORAZEPAM 1 MG: 2 INJECTION INTRAMUSCULAR; INTRAVENOUS at 02:30

## 2017-01-01 RX ADMIN — Medication 5 MG: at 20:54

## 2017-01-01 RX ADMIN — MORPHINE SULFATE 2 MG: 100 SOLUTION ORAL at 14:20

## 2017-01-01 RX ADMIN — MORPHINE SULFATE 2 MG: 2 INJECTION, SOLUTION INTRAMUSCULAR; INTRAVENOUS at 11:25

## 2017-01-01 RX ADMIN — AMPICILLIN 250 MG: 250 CAPSULE ORAL at 05:22

## 2017-01-01 RX ADMIN — LORAZEPAM 1 MG: 2 INJECTION INTRAMUSCULAR; INTRAVENOUS at 08:03

## 2017-01-01 RX ADMIN — DOXYCYCLINE HYCLATE 100 MG: 100 CAPSULE ORAL at 07:52

## 2017-01-01 RX ADMIN — MORPHINE SULFATE 2 MG: 2 INJECTION, SOLUTION INTRAMUSCULAR; INTRAVENOUS at 22:51

## 2017-01-01 RX ADMIN — MORPHINE SULFATE 2 MG: 2 INJECTION, SOLUTION INTRAMUSCULAR; INTRAVENOUS at 18:28

## 2017-01-01 RX ADMIN — PIPERACILLIN AND TAZOBACTAM 2.25 G: 2; .25 INJECTION, POWDER, LYOPHILIZED, FOR SOLUTION INTRAVENOUS; PARENTERAL at 16:27

## 2017-01-01 RX ADMIN — DOXYCYCLINE 100 MG: 100 INJECTION, POWDER, LYOPHILIZED, FOR SOLUTION INTRAVENOUS at 08:27

## 2017-01-01 RX ADMIN — MORPHINE SULFATE 2 MG: 2 INJECTION, SOLUTION INTRAMUSCULAR; INTRAVENOUS at 19:30

## 2017-01-01 RX ADMIN — MORPHINE SULFATE 2 MG: 100 SOLUTION ORAL at 22:27

## 2017-01-01 RX ADMIN — OXYBUTYNIN CHLORIDE 10 MG: 10 TABLET, EXTENDED RELEASE ORAL at 08:30

## 2017-01-01 RX ADMIN — CEFTRIAXONE SODIUM 1 G: 1 INJECTION, SOLUTION INTRAVENOUS at 10:37

## 2017-01-01 RX ADMIN — HEPARIN SODIUM 5000 UNITS: 5000 INJECTION, SOLUTION INTRAVENOUS; SUBCUTANEOUS at 00:05

## 2017-01-01 RX ADMIN — MORPHINE SULFATE 2 MG: 2 INJECTION, SOLUTION INTRAMUSCULAR; INTRAVENOUS at 04:07

## 2017-01-01 RX ADMIN — PIPERACILLIN AND TAZOBACTAM 2.25 G: 2; .25 INJECTION, POWDER, LYOPHILIZED, FOR SOLUTION INTRAVENOUS; PARENTERAL at 10:03

## 2017-01-01 RX ADMIN — AMIODARONE HYDROCHLORIDE 200 MG: 200 TABLET ORAL at 10:35

## 2017-01-01 RX ADMIN — QUETIAPINE FUMARATE 25 MG: 25 TABLET, FILM COATED ORAL at 20:54

## 2017-01-01 RX ADMIN — INSULIN LISPRO 2 UNITS: 100 INJECTION, SOLUTION INTRAVENOUS; SUBCUTANEOUS at 12:08

## 2017-01-01 RX ADMIN — AMPICILLIN 250 MG: 250 CAPSULE ORAL at 00:37

## 2017-01-01 RX ADMIN — HALOPERIDOL LACTATE 1 MG: 5 INJECTION, SOLUTION INTRAMUSCULAR at 10:18

## 2017-01-01 RX ADMIN — QUETIAPINE FUMARATE 75 MG: 25 TABLET, FILM COATED ORAL at 20:49

## 2017-01-01 RX ADMIN — TERAZOSIN HYDROCHLORIDE ANHYDROUS 1 MG: 1 CAPSULE ORAL at 20:45

## 2017-01-01 RX ADMIN — CEFTRIAXONE SODIUM 1 G: 1 INJECTION, SOLUTION INTRAVENOUS at 10:42

## 2017-01-01 RX ADMIN — LORAZEPAM 1 MG: 2 INJECTION INTRAMUSCULAR; INTRAVENOUS at 17:57

## 2017-01-01 RX ADMIN — MORPHINE SULFATE 2 MG: 2 INJECTION, SOLUTION INTRAMUSCULAR; INTRAVENOUS at 00:15

## 2017-01-01 RX ADMIN — HEPARIN SODIUM 5000 UNITS: 5000 INJECTION, SOLUTION INTRAVENOUS; SUBCUTANEOUS at 21:16

## 2017-01-01 RX ADMIN — Medication 5 MG: at 21:56

## 2017-01-01 RX ADMIN — GLYCOPYRROLATE 0.4 MG: 0.2 INJECTION, SOLUTION INTRAMUSCULAR; INTRAVENOUS at 02:10

## 2017-01-01 RX ADMIN — PHENOBARBITAL SODIUM 65 MG: 65 INJECTION INTRAMUSCULAR; INTRAVENOUS at 20:48

## 2017-01-01 RX ADMIN — AMIODARONE HYDROCHLORIDE 200 MG: 200 TABLET ORAL at 08:52

## 2017-01-01 RX ADMIN — PANTOPRAZOLE SODIUM 40 MG: 40 TABLET, DELAYED RELEASE ORAL at 06:03

## 2017-01-01 RX ADMIN — OXYBUTYNIN CHLORIDE 10 MG: 10 TABLET, EXTENDED RELEASE ORAL at 08:51

## 2017-01-01 RX ADMIN — INSULIN LISPRO 2 UNITS: 100 INJECTION, SOLUTION INTRAVENOUS; SUBCUTANEOUS at 20:51

## 2017-01-01 RX ADMIN — AMPICILLIN 250 MG: 250 CAPSULE ORAL at 05:20

## 2017-01-01 RX ADMIN — OXYBUTYNIN CHLORIDE 10 MG: 10 TABLET, EXTENDED RELEASE ORAL at 08:14

## 2017-01-01 RX ADMIN — DOXYCYCLINE HYCLATE 100 MG: 100 CAPSULE ORAL at 17:31

## 2017-01-01 RX ADMIN — LORAZEPAM 1 MG: 2 INJECTION INTRAMUSCULAR; INTRAVENOUS at 22:52

## 2017-01-01 RX ADMIN — TERAZOSIN HYDROCHLORIDE ANHYDROUS 1 MG: 1 CAPSULE ORAL at 00:05

## 2017-01-01 RX ADMIN — MORPHINE SULFATE 2 MG: 100 SOLUTION ORAL at 00:32

## 2017-01-01 RX ADMIN — DOXYCYCLINE HYCLATE 100 MG: 100 CAPSULE ORAL at 17:05

## 2017-01-01 RX ADMIN — SERTRALINE 50 MG: 50 TABLET, FILM COATED ORAL at 14:00

## 2017-01-01 RX ADMIN — AMPICILLIN 250 MG: 250 CAPSULE ORAL at 18:07

## 2017-01-01 RX ADMIN — POTASSIUM CHLORIDE 10 MEQ: 7.46 INJECTION, SOLUTION INTRAVENOUS at 04:03

## 2017-01-01 RX ADMIN — BISACODYL 10 MG: 5 TABLET, COATED ORAL at 14:17

## 2017-01-01 RX ADMIN — HEPARIN SODIUM 5000 UNITS: 5000 INJECTION, SOLUTION INTRAVENOUS; SUBCUTANEOUS at 21:08

## 2017-01-01 RX ADMIN — LORAZEPAM 0.5 MG: 2 INJECTION, SOLUTION INTRAMUSCULAR; INTRAVENOUS at 10:46

## 2017-01-01 RX ADMIN — AMIODARONE HYDROCHLORIDE 200 MG: 200 TABLET ORAL at 08:43

## 2017-01-01 RX ADMIN — AMPICILLIN 250 MG: 250 CAPSULE ORAL at 17:12

## 2017-01-01 RX ADMIN — HEPARIN SODIUM 5000 UNITS: 5000 INJECTION, SOLUTION INTRAVENOUS; SUBCUTANEOUS at 08:48

## 2017-01-01 RX ADMIN — MORPHINE SULFATE 2 MG: 2 INJECTION, SOLUTION INTRAMUSCULAR; INTRAVENOUS at 15:22

## 2017-01-01 RX ADMIN — QUETIAPINE FUMARATE 25 MG: 25 TABLET, FILM COATED ORAL at 21:56

## 2017-01-01 RX ADMIN — DOXYCYCLINE 100 MG: 100 INJECTION, POWDER, LYOPHILIZED, FOR SOLUTION INTRAVENOUS at 20:04

## 2017-01-01 RX ADMIN — HALOPERIDOL LACTATE 1 MG: 5 INJECTION, SOLUTION INTRAMUSCULAR at 23:16

## 2017-01-01 RX ADMIN — DOXYCYCLINE HYCLATE 100 MG: 100 CAPSULE ORAL at 17:39

## 2017-01-01 RX ADMIN — QUETIAPINE FUMARATE 75 MG: 25 TABLET, FILM COATED ORAL at 20:44

## 2017-01-01 RX ADMIN — AMIODARONE HYDROCHLORIDE 200 MG: 200 TABLET ORAL at 10:55

## 2017-01-01 RX ADMIN — QUETIAPINE FUMARATE 75 MG: 25 TABLET, FILM COATED ORAL at 22:26

## 2017-01-01 RX ADMIN — EPINEPHRINE 1 MG: 1 INJECTION, SOLUTION INTRAMUSCULAR; SUBCUTANEOUS at 21:38

## 2017-01-01 RX ADMIN — LORAZEPAM 1 MG: 1 TABLET ORAL at 19:17

## 2017-01-01 RX ADMIN — PANTOPRAZOLE SODIUM 40 MG: 40 TABLET, DELAYED RELEASE ORAL at 06:01

## 2017-01-01 RX ADMIN — PIPERACILLIN AND TAZOBACTAM 2.25 G: 2; .25 INJECTION, POWDER, LYOPHILIZED, FOR SOLUTION INTRAVENOUS; PARENTERAL at 05:51

## 2017-01-01 RX ADMIN — DOXYCYCLINE HYCLATE 100 MG: 100 CAPSULE ORAL at 08:38

## 2017-01-01 RX ADMIN — Medication 5 MG: at 20:48

## 2017-01-01 RX ADMIN — LORAZEPAM 1 MG: 2 INJECTION INTRAMUSCULAR; INTRAVENOUS at 17:25

## 2017-01-01 RX ADMIN — AMPICILLIN 250 MG: 250 CAPSULE ORAL at 11:27

## 2017-01-01 RX ADMIN — PHENOBARBITAL SODIUM 65 MG: 65 INJECTION INTRAMUSCULAR; INTRAVENOUS at 00:43

## 2017-01-01 RX ADMIN — LIDOCAINE HYDROCHLORIDE 5 ML: 10 INJECTION, SOLUTION EPIDURAL; INFILTRATION; INTRACAUDAL; PERINEURAL at 10:50

## 2017-01-01 RX ADMIN — LORAZEPAM 1 MG: 2 INJECTION INTRAMUSCULAR; INTRAVENOUS at 06:15

## 2017-01-01 RX ADMIN — NICOTINE 1 PATCH: 14 PATCH, EXTENDED RELEASE TRANSDERMAL at 09:00

## 2017-01-01 RX ADMIN — LORAZEPAM 1 MG: 1 TABLET ORAL at 00:33

## 2017-01-01 RX ADMIN — DOXYCYCLINE HYCLATE 100 MG: 100 CAPSULE ORAL at 16:39

## 2017-01-01 RX ADMIN — HEPARIN SODIUM 5000 UNITS: 5000 INJECTION, SOLUTION INTRAVENOUS; SUBCUTANEOUS at 09:19

## 2017-01-01 RX ADMIN — OXYBUTYNIN CHLORIDE 10 MG: 10 TABLET, EXTENDED RELEASE ORAL at 08:28

## 2017-01-01 RX ADMIN — AMIODARONE HYDROCHLORIDE 200 MG: 200 TABLET ORAL at 09:12

## 2017-01-01 RX ADMIN — SERTRALINE HYDROCHLORIDE 50 MG: 50 TABLET ORAL at 08:28

## 2017-01-01 RX ADMIN — MORPHINE SULFATE 2 MG: 100 SOLUTION ORAL at 20:25

## 2017-01-01 RX ADMIN — SERTRALINE 50 MG: 50 TABLET, FILM COATED ORAL at 08:39

## 2017-01-01 RX ADMIN — INSULIN LISPRO 2 UNITS: 100 INJECTION, SOLUTION INTRAVENOUS; SUBCUTANEOUS at 08:41

## 2017-01-01 RX ADMIN — AMIODARONE HYDROCHLORIDE 150 MG: 50 INJECTION, SOLUTION INTRAVENOUS at 21:42

## 2017-01-01 RX ADMIN — MORPHINE SULFATE 2 MG: 2 INJECTION, SOLUTION INTRAMUSCULAR; INTRAVENOUS at 11:15

## 2017-01-01 RX ADMIN — INSULIN LISPRO 2 UNITS: 100 INJECTION, SOLUTION INTRAVENOUS; SUBCUTANEOUS at 16:38

## 2017-01-01 RX ADMIN — MORPHINE SULFATE 2 MG: 2 INJECTION, SOLUTION INTRAMUSCULAR; INTRAVENOUS at 05:10

## 2017-01-01 RX ADMIN — SERTRALINE 50 MG: 50 TABLET, FILM COATED ORAL at 09:12

## 2017-01-01 RX ADMIN — Medication 5 MG: at 21:55

## 2017-01-01 RX ADMIN — MORPHINE SULFATE 2 MG: 2 INJECTION, SOLUTION INTRAMUSCULAR; INTRAVENOUS at 01:00

## 2017-01-01 RX ADMIN — MORPHINE SULFATE 2 MG: 2 INJECTION, SOLUTION INTRAMUSCULAR; INTRAVENOUS at 11:10

## 2017-01-01 RX ADMIN — HEPARIN SODIUM 5000 UNITS: 5000 INJECTION, SOLUTION INTRAVENOUS; SUBCUTANEOUS at 21:11

## 2017-01-01 RX ADMIN — PANTOPRAZOLE SODIUM 40 MG: 40 TABLET, DELAYED RELEASE ORAL at 05:20

## 2017-01-01 RX ADMIN — INSULIN LISPRO 2 UNITS: 100 INJECTION, SOLUTION INTRAVENOUS; SUBCUTANEOUS at 08:19

## 2017-01-01 RX ADMIN — TERAZOSIN HYDROCHLORIDE ANHYDROUS 1 MG: 1 CAPSULE ORAL at 20:54

## 2017-01-01 RX ADMIN — AMPICILLIN 250 MG: 250 CAPSULE ORAL at 05:29

## 2017-01-01 RX ADMIN — MORPHINE SULFATE 2 MG: 2 INJECTION, SOLUTION INTRAMUSCULAR; INTRAVENOUS at 17:58

## 2017-01-01 RX ADMIN — QUETIAPINE FUMARATE 25 MG: 25 TABLET, FILM COATED ORAL at 16:12

## 2017-01-01 RX ADMIN — PANTOPRAZOLE SODIUM 40 MG: 40 TABLET, DELAYED RELEASE ORAL at 05:01

## 2017-01-01 RX ADMIN — TERAZOSIN HYDROCHLORIDE ANHYDROUS 1 MG: 1 CAPSULE ORAL at 20:59

## 2017-01-01 RX ADMIN — SERTRALINE 50 MG: 50 TABLET, FILM COATED ORAL at 08:48

## 2017-01-01 RX ADMIN — TERAZOSIN HYDROCHLORIDE ANHYDROUS 1 MG: 1 CAPSULE ORAL at 20:15

## 2017-01-01 RX ADMIN — MORPHINE SULFATE 2 MG: 2 INJECTION, SOLUTION INTRAMUSCULAR; INTRAVENOUS at 11:21

## 2017-01-01 RX ADMIN — AMPICILLIN 250 MG: 250 CAPSULE ORAL at 11:58

## 2017-01-01 RX ADMIN — DOXYCYCLINE HYCLATE 100 MG: 100 CAPSULE ORAL at 08:48

## 2017-01-01 RX ADMIN — Medication 5 MG: at 21:06

## 2017-01-01 RX ADMIN — CEFTRIAXONE SODIUM 1 G: 1 INJECTION, SOLUTION INTRAVENOUS at 08:00

## 2017-01-01 RX ADMIN — LIDOCAINE HYDROCHLORIDE 2.1 ML: 10 INJECTION, SOLUTION EPIDURAL; INFILTRATION; INTRACAUDAL; PERINEURAL at 12:13

## 2017-01-01 RX ADMIN — QUETIAPINE FUMARATE 50 MG: 25 TABLET, FILM COATED ORAL at 21:06

## 2017-01-01 RX ADMIN — QUETIAPINE FUMARATE 75 MG: 25 TABLET, FILM COATED ORAL at 20:25

## 2017-01-01 RX ADMIN — TERAZOSIN HYDROCHLORIDE ANHYDROUS 1 MG: 1 CAPSULE ORAL at 20:51

## 2017-01-01 RX ADMIN — AMIODARONE HYDROCHLORIDE 200 MG: 200 TABLET ORAL at 08:48

## 2017-01-01 RX ADMIN — INSULIN LISPRO 4 UNITS: 100 INJECTION, SOLUTION INTRAVENOUS; SUBCUTANEOUS at 21:16

## 2017-01-01 RX ADMIN — INSULIN LISPRO 2 UNITS: 100 INJECTION, SOLUTION INTRAVENOUS; SUBCUTANEOUS at 17:05

## 2017-01-01 RX ADMIN — OXYBUTYNIN CHLORIDE 10 MG: 10 TABLET, EXTENDED RELEASE ORAL at 09:19

## 2017-01-01 RX ADMIN — QUETIAPINE FUMARATE 25 MG: 25 TABLET, FILM COATED ORAL at 21:11

## 2017-01-01 RX ADMIN — MORPHINE SULFATE 2 MG: 100 SOLUTION ORAL at 22:54

## 2017-01-01 RX ADMIN — TERAZOSIN HYDROCHLORIDE ANHYDROUS 1 MG: 1 CAPSULE ORAL at 20:26

## 2017-01-01 RX ADMIN — MORPHINE SULFATE 2 MG: 2 INJECTION, SOLUTION INTRAMUSCULAR; INTRAVENOUS at 20:09

## 2017-01-01 RX ADMIN — LORAZEPAM 1 MG: 2 INJECTION INTRAMUSCULAR; INTRAVENOUS at 09:12

## 2017-01-01 RX ADMIN — TERAZOSIN HYDROCHLORIDE ANHYDROUS 1 MG: 1 CAPSULE ORAL at 21:38

## 2017-01-01 RX ADMIN — SERTRALINE 50 MG: 50 TABLET, FILM COATED ORAL at 08:38

## 2017-01-01 RX ADMIN — MORPHINE SULFATE 2 MG: 2 INJECTION, SOLUTION INTRAMUSCULAR; INTRAVENOUS at 08:21

## 2017-01-01 RX ADMIN — AMPICILLIN 250 MG: 250 CAPSULE ORAL at 12:15

## 2017-01-01 RX ADMIN — MORPHINE SULFATE 2 MG: 2 INJECTION, SOLUTION INTRAMUSCULAR; INTRAVENOUS at 01:40

## 2017-01-01 RX ADMIN — HEPARIN SODIUM 5000 UNITS: 5000 INJECTION, SOLUTION INTRAVENOUS; SUBCUTANEOUS at 21:01

## 2017-01-01 RX ADMIN — CEFTRIAXONE SODIUM 1 G: 1 INJECTION, SOLUTION INTRAVENOUS at 08:48

## 2017-01-01 RX ADMIN — OXYBUTYNIN CHLORIDE 10 MG: 10 TABLET, EXTENDED RELEASE ORAL at 08:39

## 2017-01-01 RX ADMIN — HALOPERIDOL LACTATE 2 MG: 5 INJECTION, SOLUTION INTRAMUSCULAR at 15:22

## 2017-01-01 RX ADMIN — AMIODARONE HYDROCHLORIDE 0.5 MG/MIN: 1.8 INJECTION, SOLUTION INTRAVENOUS at 06:10

## 2017-01-01 RX ADMIN — HEPARIN SODIUM 5000 UNITS: 5000 INJECTION, SOLUTION INTRAVENOUS; SUBCUTANEOUS at 09:09

## 2017-01-01 RX ADMIN — MORPHINE SULFATE 2 MG: 2 INJECTION, SOLUTION INTRAMUSCULAR; INTRAVENOUS at 11:58

## 2017-01-01 RX ADMIN — HALOPERIDOL LACTATE 2 MG: 5 INJECTION, SOLUTION INTRAMUSCULAR at 16:46

## 2017-01-01 RX ADMIN — OXYBUTYNIN CHLORIDE 10 MG: 10 TABLET, EXTENDED RELEASE ORAL at 08:48

## 2017-01-01 RX ADMIN — NICOTINE 1 PATCH: 14 PATCH TRANSDERMAL at 16:29

## 2017-01-01 RX ADMIN — SERTRALINE HYDROCHLORIDE 50 MG: 50 TABLET ORAL at 10:33

## 2017-01-01 RX ADMIN — PANTOPRAZOLE SODIUM 40 MG: 40 TABLET, DELAYED RELEASE ORAL at 05:17

## 2017-01-01 RX ADMIN — NICOTINE 1 PATCH: 14 PATCH TRANSDERMAL at 10:35

## 2017-01-01 RX ADMIN — HEPARIN SODIUM 5000 UNITS: 5000 INJECTION, SOLUTION INTRAVENOUS; SUBCUTANEOUS at 09:11

## 2017-01-01 RX ADMIN — AMPICILLIN 250 MG: 250 CAPSULE ORAL at 11:50

## 2017-01-01 RX ADMIN — MORPHINE SULFATE 2 MG: 2 INJECTION, SOLUTION INTRAMUSCULAR; INTRAVENOUS at 22:45

## 2017-01-01 RX ADMIN — QUETIAPINE FUMARATE 25 MG: 25 TABLET, FILM COATED ORAL at 08:28

## 2017-01-01 RX ADMIN — Medication 5 MG: at 22:25

## 2017-01-01 RX ADMIN — Medication 5 MG: at 20:25

## 2017-01-01 RX ADMIN — AMPICILLIN 250 MG: 250 CAPSULE ORAL at 00:29

## 2017-01-01 RX ADMIN — AMIODARONE HYDROCHLORIDE 200 MG: 200 TABLET ORAL at 08:28

## 2017-01-01 RX ADMIN — OXYBUTYNIN CHLORIDE 10 MG: 10 TABLET, EXTENDED RELEASE ORAL at 08:43

## 2017-01-01 RX ADMIN — QUETIAPINE FUMARATE 25 MG: 25 TABLET, FILM COATED ORAL at 11:26

## 2017-01-01 RX ADMIN — QUETIAPINE FUMARATE 50 MG: 25 TABLET, FILM COATED ORAL at 21:56

## 2017-01-01 RX ADMIN — INSULIN LISPRO 2 UNITS: 100 INJECTION, SOLUTION INTRAVENOUS; SUBCUTANEOUS at 21:37

## 2017-01-01 RX ADMIN — LORAZEPAM 1 MG: 2 INJECTION INTRAMUSCULAR; INTRAVENOUS at 16:19

## 2017-01-01 RX ADMIN — PHENOBARBITAL SODIUM 60 MG: 65 INJECTION INTRAMUSCULAR; INTRAVENOUS at 01:04

## 2017-01-01 RX ADMIN — SULFUR HEXAFLUORIDE 3 ML: KIT at 11:30

## 2017-01-01 RX ADMIN — SERTRALINE 50 MG: 50 TABLET, FILM COATED ORAL at 10:43

## 2017-01-01 RX ADMIN — PANTOPRAZOLE SODIUM 40 MG: 40 TABLET, DELAYED RELEASE ORAL at 05:22

## 2017-01-01 RX ADMIN — PHENOBARBITAL SODIUM 65 MG: 65 INJECTION INTRAMUSCULAR; INTRAVENOUS at 23:51

## 2017-01-01 RX ADMIN — PHENOBARBITAL SODIUM 30 MG: 65 INJECTION INTRAMUSCULAR; INTRAVENOUS at 09:00

## 2017-01-01 RX ADMIN — MORPHINE SULFATE 2 MG: 2 INJECTION, SOLUTION INTRAMUSCULAR; INTRAVENOUS at 05:17

## 2017-01-01 RX ADMIN — LORAZEPAM 1 MG: 2 INJECTION INTRAMUSCULAR; INTRAVENOUS at 08:20

## 2017-01-01 RX ADMIN — POTASSIUM CHLORIDE 10 MEQ: 750 CAPSULE, EXTENDED RELEASE ORAL at 18:51

## 2017-01-01 RX ADMIN — INSULIN LISPRO 2 UNITS: 100 INJECTION, SOLUTION INTRAVENOUS; SUBCUTANEOUS at 21:57

## 2017-01-01 RX ADMIN — DOXYCYCLINE HYCLATE 100 MG: 100 CAPSULE ORAL at 17:12

## 2017-01-01 RX ADMIN — AMPICILLIN 250 MG: 250 CAPSULE ORAL at 00:30

## 2017-01-01 RX ADMIN — OXYBUTYNIN CHLORIDE 10 MG: 10 TABLET, EXTENDED RELEASE ORAL at 11:25

## 2017-01-01 RX ADMIN — AMPICILLIN 250 MG: 250 CAPSULE ORAL at 05:02

## 2017-01-01 RX ADMIN — HEPARIN SODIUM 5000 UNITS: 5000 INJECTION, SOLUTION INTRAVENOUS; SUBCUTANEOUS at 20:51

## 2017-01-01 RX ADMIN — POTASSIUM CHLORIDE 10 MEQ: 7.46 INJECTION, SOLUTION INTRAVENOUS at 06:46

## 2017-01-01 RX ADMIN — SERTRALINE HYDROCHLORIDE 50 MG: 50 TABLET ORAL at 09:52

## 2017-01-01 RX ADMIN — MORPHINE SULFATE 2 MG: 100 SOLUTION ORAL at 01:17

## 2017-01-01 RX ADMIN — INSULIN LISPRO 4 UNITS: 100 INJECTION, SOLUTION INTRAVENOUS; SUBCUTANEOUS at 18:51

## 2017-01-01 RX ADMIN — AMIODARONE HYDROCHLORIDE 200 MG: 200 TABLET ORAL at 11:28

## 2017-01-01 RX ADMIN — SERTRALINE 50 MG: 50 TABLET, FILM COATED ORAL at 09:19

## 2017-01-01 RX ADMIN — LORAZEPAM 1 MG: 2 INJECTION INTRAMUSCULAR; INTRAVENOUS at 01:41

## 2017-01-01 RX ADMIN — AMPICILLIN 250 MG: 250 CAPSULE ORAL at 17:26

## 2017-01-01 RX ADMIN — MORPHINE SULFATE 2 MG: 2 INJECTION, SOLUTION INTRAMUSCULAR; INTRAVENOUS at 14:43

## 2017-01-01 RX ADMIN — QUETIAPINE FUMARATE 50 MG: 25 TABLET, FILM COATED ORAL at 20:48

## 2017-01-01 RX ADMIN — MORPHINE SULFATE 2 MG: 2 INJECTION, SOLUTION INTRAMUSCULAR; INTRAVENOUS at 10:24

## 2017-01-01 RX ADMIN — Medication: at 14:02

## 2017-01-01 RX ADMIN — AMPICILLIN 250 MG: 250 CAPSULE ORAL at 01:20

## 2017-01-01 RX ADMIN — INSULIN LISPRO 2 UNITS: 100 INJECTION, SOLUTION INTRAVENOUS; SUBCUTANEOUS at 11:50

## 2017-01-01 RX ADMIN — SERTRALINE 50 MG: 50 TABLET, FILM COATED ORAL at 09:09

## 2017-01-01 RX ADMIN — HEPARIN SODIUM 5000 UNITS: 5000 INJECTION, SOLUTION INTRAVENOUS; SUBCUTANEOUS at 10:43

## 2017-01-01 RX ADMIN — HEPARIN SODIUM 5000 UNITS: 5000 INJECTION, SOLUTION INTRAVENOUS; SUBCUTANEOUS at 08:14

## 2017-01-01 RX ADMIN — QUETIAPINE FUMARATE 25 MG: 25 TABLET, FILM COATED ORAL at 11:28

## 2017-01-01 RX ADMIN — Medication 5 MG: at 20:59

## 2017-01-01 RX ADMIN — AMIODARONE HYDROCHLORIDE 200 MG: 200 TABLET ORAL at 09:52

## 2017-01-01 RX ADMIN — LORAZEPAM 1 MG: 2 INJECTION INTRAMUSCULAR; INTRAVENOUS at 00:05

## 2017-01-01 RX ADMIN — MORPHINE SULFATE 2 MG: 2 INJECTION, SOLUTION INTRAMUSCULAR; INTRAVENOUS at 06:14

## 2017-01-01 RX ADMIN — AMPICILLIN 250 MG: 250 CAPSULE ORAL at 23:17

## 2017-01-01 RX ADMIN — HEPARIN SODIUM 5000 UNITS: 5000 INJECTION, SOLUTION INTRAVENOUS; SUBCUTANEOUS at 20:59

## 2017-01-01 RX ADMIN — HALOPERIDOL LACTATE 0.5 MG: 5 INJECTION, SOLUTION INTRAMUSCULAR at 11:33

## 2017-01-01 RX ADMIN — DOXYCYCLINE HYCLATE 100 MG: 100 CAPSULE ORAL at 10:43

## 2017-01-01 RX ADMIN — HALOPERIDOL LACTATE 1 MG: 5 INJECTION, SOLUTION INTRAMUSCULAR at 19:56

## 2017-01-01 RX ADMIN — OXYBUTYNIN CHLORIDE 10 MG: 10 TABLET, EXTENDED RELEASE ORAL at 08:01

## 2017-01-01 RX ADMIN — TERAZOSIN HYDROCHLORIDE ANHYDROUS 1 MG: 1 CAPSULE ORAL at 21:56

## 2017-01-01 RX ADMIN — HALOPERIDOL LACTATE 1 MG: 5 INJECTION, SOLUTION INTRAMUSCULAR at 21:15

## 2017-01-01 RX ADMIN — AMPICILLIN 250 MG: 250 CAPSULE ORAL at 23:18

## 2017-01-01 RX ADMIN — HEPARIN SODIUM 5000 UNITS: 5000 INJECTION, SOLUTION INTRAVENOUS; SUBCUTANEOUS at 08:17

## 2017-01-01 RX ADMIN — OXYBUTYNIN CHLORIDE 10 MG: 10 TABLET, EXTENDED RELEASE ORAL at 10:43

## 2017-01-01 RX ADMIN — TERAZOSIN HYDROCHLORIDE ANHYDROUS 1 MG: 1 CAPSULE ORAL at 21:07

## 2017-01-01 RX ADMIN — AMPICILLIN 250 MG: 250 CAPSULE ORAL at 06:01

## 2017-03-15 NOTE — TELEPHONE ENCOUNTER
Furosemide 20mg is listed on current med list.     LM for pt to call back.     Verify dosage and instructions.

## 2017-04-06 PROBLEM — G89.29 CHRONIC MIDLINE LOW BACK PAIN WITHOUT SCIATICA: Status: ACTIVE | Noted: 2017-01-01

## 2017-04-06 PROBLEM — M54.50 CHRONIC MIDLINE LOW BACK PAIN WITHOUT SCIATICA: Status: ACTIVE | Noted: 2017-01-01

## 2017-04-06 NOTE — ASSESSMENT & PLAN NOTE
Update cmp   Baseline creat 2.0  Some degree prerenal related to chf  Also ckd related to recurrent nephrolithiasis in past (cured with parathyroidectomy)

## 2017-04-06 NOTE — PROGRESS NOTES
José Miguel Mackey Jr. 87 y.o.  CC: Follow-up; Diabetes (type II); Hyperlipidemia; Hypertension; and Chronic Kidney Disease      [place]: Follow-up; Diabetes (type II); Hyperlipidemia; Hypertension; and Chronic Kidney Disease  bp is good   Blood sugar and 90 day average sugar reviewed  Results for orders placed or performed in visit on 04/06/17   POC Glycosylated Hemoglobin (Hb A1C)   Result Value Ref Range    Hemoglobin A1C 6.1 %   POC Glucose Fingerstick   Result Value Ref Range    Glucose 168 (A) 70 - 130 mg/dL     Doing well overall with sugars, but has lost about 10 lbs - no appetite   Is up to date with preventive care at [facility] including vaccines  Foot care is good- some fungal nail problems but no callus or ulcer  Ur alb due  He is up to date with eye exam- Dr Celis  Also c/o recurrent left hip pain - worse with ambulation  He has seen Dr Richardson for this in the past- would like reevaluation (shot given via Dr Richardson provided immediate relief)  Also has had more problems with gait and walking, less stable  Discussed home PT and he is agreeable.      Allergies   Allergen Reactions   • Beta Adrenergic Blockers    • Dipyridamole    • Dobutamine        Current Outpatient Prescriptions:   •  amiodarone (PACERONE) 200 MG tablet, TAKE ONE TABLET BY MOUTH DAILY, Disp: 90 tablet, Rfl: 0  •  aspirin (ASPIRIN LOW DOSE) 81 MG tablet, Take  by mouth daily., Disp: , Rfl:   •  Cholecalciferol (VITAMIN D-3) 1000 UNITS capsule, Take  by mouth., Disp: , Rfl:   •  citalopram (CeleXA) 40 MG tablet, TAKE ONE-HALF TABLET BY MOUTH ONCE  DAILY, Disp: 45 tablet, Rfl: 0  •  furosemide (LASIX) 20 MG tablet, Take  by mouth daily., Disp: , Rfl:   •  glucose blood (ONE TOUCH ULTRA TEST) test strip, daily., Disp: , Rfl:   •  Multiple Vitamin tablet, Take  by mouth daily., Disp: , Rfl:   •  omeprazole (PRILOSEC) 20 MG capsule, Take 1 capsule by mouth., Disp: , Rfl:   •  potassium chloride (MICRO-K) 8 MEQ CR capsule, TAKE ONE CAPSULE BY MOUTH DAILY,  Disp: 90 each, Rfl: 1  •  pravastatin (PRAVACHOL) 20 MG tablet, Take  by mouth., Disp: , Rfl:   •  rivastigmine (EXELON) 1.5 MG capsule, Take 1.5 mg by mouth 2 (Two) Times a Day., Disp: , Rfl:   •  spironolactone (ALDACTONE) 25 MG tablet, Take  by mouth daily., Disp: , Rfl:   Patient Active Problem List    Diagnosis   • Chronic midline low back pain without sciatica [M54.5, G89.29]     Assessment & Plan Note:     [facility] home PT- is essentially homebound      • Weakness of both legs [M62.81]   • CHF (congestive heart failure) [I50.9]     Overview Note:     1. CHF:  a. On 03/24/2014, EF less than 20%, moderate MR, mild to moderate TR, RVSP 45-50.    b. Remote angioplasty and MI, greater than 20 years prior, data deficient, historical patient of Dr. Llamas.  c. BNP 5000, most recent 1100 as of April 2014 and 300 as of July 2014.         • Dyslipidemia [E78.5]     Overview Note:     2. Dyslipidemia:  a. March 2014:  Total 131, triglycerides 59, HDL 62, LDL 64.   b. December 2014:  Total cholesterol 138, triglycerides 91, HDL 56 and LDL 72.         Assessment & Plan Note:     Good control on current regimen- goal ldl < 70 due to CAD     • Mild dementia [F03.90]   • Right bundle branch block [I45.10]     Overview Note:     3. Abnormal EKG:  a. Right bundle branch block secondary to CT change, QRS of 490 milliseconds.         • Abdominal pain [R10.9]   • Anemia [D64.9]     Overview Note:     Impression: 08/31/2015 - check cbc  Impression: 04/07/2015 - last cbc stable  update next ov  Impression: 12/02/2014 - check cbc;      • Ascites [R18.8]     Overview Note:     Impression: 03/17/2014 - check bnp, low albumin, check liver markers;      • Benign essential hypertension [I10]     Overview Note:     Impression: 08/31/2015 - bp is good today, continue to monitor  Impression: 04/07/2015 - bp is good today  Impression: 07/30/2014 - check cmp and bnp  only on diuretic now  Impression: 04/07/2014 - bp is good;        Assessment &  Plan Note:     bp is good- no symptoms of orthostasis      • Cardiomyopathy [I42.9]     Overview Note:     Impression: 12/02/2014 - sees Dr Monique- doing well  Impression: 04/07/2014 - sees  cardiology next month; Description: ef less than 20%,, elevated right heart pressures     • Chronic kidney disease, stage IV (severe) [N18.4]     Overview Note:     Impression: 08/31/2015 - update cmp  Impression: 12/02/2014 - update creatinine  Impression: 07/30/2014 - update cmp  Impression: 04/07/2014 - reviewed un and creatinine  have asked him to increase fluids  Impression: 03/17/2014 - update creat, increase diuretic due to edema;     4. CKD, stage 4:  a. Creatinine 2.0.   b. Creatinine 2.2 as of December 2014.         Assessment & Plan Note:     Update cmp   Baseline creat 2.0  Some degree prerenal related to chf  Also ckd related to recurrent nephrolithiasis in past (cured with parathyroidectomy)     • Confusional state [F44.89]     Overview Note:     Impression: 07/30/2014 - resolved- stop donezepil  Impression: 04/07/2014 - resolved with treatment of chf  Impression: 02/20/2014 - episodic and severe, leading to confusion to the point where he does not know where he is. H/O cognitive decline but new symptoms have been much more severe;      • Constipation [K59.00]     Overview Note:     Impression: 03/17/2014 - miralax daily.;      • Atherosclerosis of coronary artery [I25.10]     Overview Note:     Description: [facility]- now Dr Bray- ed 35%, stress test anteroapical scar, hypokinesis  Aortic sclerosis, mitral calcificat- annular  4/4 echo     • Dementia [F03.90]     Overview Note:     Impression: 12/02/2014 - add back aricept - discussed with wife;      • Depression [F32.9]   • Diabetes mellitus [E11.9]     Overview Note:     Impression: 08/31/2015 - blood sugar is 169, hgn a1c 6.1%  is up to date with eye exam  no change neuropathy  ur alb neg  Impression: 04/07/2015 - blood sugar is 129, hgn a1c 6.1% (average  135)  is up to date with eye exam  no low sugars  foot care is good  decreased pulses   ckd with neg alb   bp is good  Impression: 12/02/2014 - blood sugar and 90 day average sugar are good   nephropathy stable  neuropathy multifactorial- continue to monitor   is up to date with eye exam  no hypoglycemia  follow  Impression: 07/30/2014 - blood sugar and average sugar are good  has nephropathy, continue to monitor  stopped ace due to low bp and orthostasis  discussed with patient  snack or juice if shaky- concerned about low sugar;        Assessment & Plan Note:     Blood sugar and 90 day average sugar reviewed  Results for orders placed or performed in visit on 04/06/17   POC Glycosylated Hemoglobin (Hb A1C)   Result Value Ref Range    Hemoglobin A1C 6.1 %   POC Glucose Fingerstick   Result Value Ref Range    Glucose 168 (A) 70 - 130 mg/dL     Recommended trial of supplements for weight maintenance (but advised to check carb content and watch blood sugars).  He is up to date with preventive care  No change in medications, no low sugars  Eye Exam- Dr Celis  Ur alb due today  F/u 3-4 months      • Edema [R60.9]     Overview Note:     Impression: 03/17/2014 - adjust medication;      • Gastroesophageal reflux disease [K21.9]   • Gout [M10.9]   • Arthralgia of hip [M25.559]     Overview Note:     Impression: 04/07/2015 - see above, multifactorial  discussed getting opinion - he will call;        Assessment & Plan Note:     He would like to go back and get an opinion from Dr Richardson     • Hyperlipidemia [E78.5]     Overview Note:     Impression: 08/31/2015 - check flp  Impression: 04/07/2015 - reviewed last lipids with him along with targets for chol with h/o cad  Impression: 12/02/2014 - check flp  Impression: 07/30/2014 - check flp;        Assessment & Plan Note:     Update flp today      • Hypertension [I10]     Overview Note:     Impression: 08/31/2015 - bp is good  Impression: 12/02/2014 - bp is good  Impression:  07/30/2014 - bp is good, orthostatic changes- discussed increase po intake and slow change in posture  Impression: 02/20/2014 - bp is good currently;        Assessment & Plan Note:     See above.       • Knee pain [M25.569]   • Memory loss [R41.3]     Overview Note:     Impression: 03/14/2016 - discussed referral to neurology but he declines   will increase donezepril to 10 mg    consider adding agent   he will let us know if he would like referral  Impression: 03/17/2014 - on aricept- may be slightly better  shaking spell overnight thursday- ? eeg  check ammonia  Impression: 02/20/2014 - refer neurology- cat scan and carotid without acute changes, may need eeg given waxing /waning h/o;      • Osteoarthritis of knee [M17.9]     Overview Note:     Impression: 04/07/2015 - he will call and discuss with Dr Melo  I wonder if this is his back - antalgic gait  needs updated xrays;      • Shortness of breath [R06.02]   • Sinus bradycardia [R00.1]   • Swelling of lower extremity [M79.89]   • Tremor [R25.1]   • Urinary tract infection [N39.0]     Overview Note:     Impression: 07/30/2014 - check ua;        Review of Systems   Constitutional: Positive for activity change, appetite change, fatigue and unexpected weight change. Negative for chills, diaphoresis and fever.   HENT: Positive for hearing loss. Negative for congestion, dental problem, drooling, ear discharge, ear pain, facial swelling, mouth sores, nosebleeds, postnasal drip, rhinorrhea, sinus pressure, sneezing, sore throat, tinnitus, trouble swallowing and voice change.    Eyes: Negative for photophobia, pain, discharge, redness, itching and visual disturbance.   Respiratory: Positive for shortness of breath. Negative for apnea, cough, choking, chest tightness, wheezing and stridor.    Cardiovascular: Negative for chest pain, palpitations and leg swelling.   Gastrointestinal: Negative for abdominal distention, abdominal pain, anal bleeding, blood in stool,  "constipation, diarrhea, nausea, rectal pain and vomiting.   Endocrine: Negative for cold intolerance, heat intolerance, polydipsia, polyphagia and polyuria.   Genitourinary: Negative for decreased urine volume, difficulty urinating, dysuria, enuresis, flank pain, frequency, genital sores, hematuria and urgency.   Musculoskeletal: Positive for arthralgias, back pain, gait problem and myalgias. Negative for joint swelling, neck pain and neck stiffness.   Skin: Negative for color change, pallor, rash and wound.   Allergic/Immunologic: Negative for environmental allergies, food allergies and immunocompromised state.   Neurological: Positive for weakness. Negative for dizziness, tremors, seizures, syncope, facial asymmetry, speech difficulty, light-headedness, numbness and headaches.   Hematological: Negative for adenopathy. Does not bruise/bleed easily.   Psychiatric/Behavioral: Negative for agitation, behavioral problems, confusion, decreased concentration, dysphoric mood, hallucinations, self-injury, sleep disturbance and suicidal ideas. The patient is not nervous/anxious and is not hyperactive.      Social History     Social History   • Marital status:      Spouse name: N/A   • Number of children: N/A   • Years of education: N/A     Occupational History   • Not on file.     Social History Main Topics   • Smoking status: Former Smoker   • Smokeless tobacco: Current User     Types: Chew   • Alcohol use No   • Drug use: No   • Sexual activity: Defer     Other Topics Concern   • Not on file     Social History Narrative     Family History   Problem Relation Age of Onset   • Heart attack Other    • Heart disease Sister    • Heart attack Brother      /66  Pulse 64  Ht 68\" (172.7 cm)  Wt 153 lb 11.2 oz (69.7 kg)  SpO2 98%  BMI 23.37 kg/m2  Physical Exam   Constitutional: He is oriented to person, place, and time. He appears well-developed and well-nourished.   HENT:   Head: Normocephalic and atraumatic. "   Nose: Nose normal.   Mouth/Throat: Oropharynx is clear and moist.   Eyes: Conjunctivae, EOM and lids are normal. Pupils are equal, round, and reactive to light.   Neck: Trachea normal and normal range of motion. Neck supple. Carotid bruit is not present. No tracheal deviation present. No thyroid mass and no thyromegaly present.   Cardiovascular: Normal rate, regular rhythm, normal heart sounds and intact distal pulses.  Exam reveals no gallop and no friction rub.    No murmur heard.  Pulmonary/Chest: Effort normal and breath sounds normal. No respiratory distress. He has no wheezes.   Musculoskeletal: Normal range of motion. He exhibits no edema or deformity.    José Miguel had a diabetic foot exam performed (fungal nail infection bilateral gt toes) today.   During the foot exam he had a monofilament test performed.    Neurological Sensory Findings - Altered hot/cold right ankle/foot discrimination and altered hot/cold left ankle/foot discrimination. Altered sharp/dull right ankle/foot discrimination and altered sharp/dull left ankle/foot discrimination. Right ankle/foot altered proprioception and left ankle/foot altered proprioception.    Vascular Status -  His exam exhibits right foot vasculature normal. His exam exhibits no right foot edema. His exam exhibits left foot vasculature normal. His exam exhibits no left foot edema.   Skin Integrity  -  His right foot skin is intact.     José Miguel 's left foot skin is intact. .  Lymphadenopathy:     He has no cervical adenopathy.   Neurological: He is alert and oriented to person, place, and time. He has normal reflexes. He displays normal reflexes. No cranial nerve deficit.   Skin: Skin is warm and dry. No rash noted. No cyanosis or erythema. Nails show no clubbing.   Psychiatric: He has a normal mood and affect. His speech is normal and behavior is normal. Judgment and thought content normal. Cognition and memory are normal.   Nursing note and vitals reviewed.    Results  for orders placed or performed in visit on 04/06/17   POC Glycosylated Hemoglobin (Hb A1C)   Result Value Ref Range    Hemoglobin A1C 6.1 %   POC Glucose Fingerstick   Result Value Ref Range    Glucose 168 (A) 70 - 130 mg/dL     José Miguel was seen today for follow-up, diabetes, hyperlipidemia, hypertension and chronic kidney disease.    Diagnoses and all orders for this visit:    Type 2 diabetes mellitus without complication, without long-term current use of insulin  -     POC Glycosylated Hemoglobin (Hb A1C)  -     POC Glucose Fingerstick  -     Sedimentation Rate    Benign essential hypertension  -     Sedimentation Rate    Mixed hyperlipidemia  -     TSH  -     Comprehensive Metabolic Panel  -     Lipid Panel    Essential hypertension  -     Sedimentation Rate    Chronic kidney disease, stage IV (severe)  -     CBC & Differential  -     CBC Auto Differential    Dyslipidemia    Chronic midline low back pain without sciatica  -     PT Consult: Eval & Treat  -     Ambulatory Referral to Home Health    Pain of left hip joint  -     Ambulatory Referral to Orthopedic Surgery  -     Ambulatory Referral to Home Health      Problem List Items Addressed This Visit        Cardiovascular and Mediastinum    Benign essential hypertension     bp is good- no symptoms of orthostasis          Relevant Orders    Sedimentation Rate    Hyperlipidemia     Update flp today          Relevant Orders    TSH    Comprehensive Metabolic Panel    Lipid Panel    Hypertension     See above.           Relevant Orders    Sedimentation Rate       Endocrine    Diabetes mellitus - Primary     Blood sugar and 90 day average sugar reviewed  Results for orders placed or performed in visit on 04/06/17   POC Glycosylated Hemoglobin (Hb A1C)   Result Value Ref Range    Hemoglobin A1C 6.1 %   POC Glucose Fingerstick   Result Value Ref Range    Glucose 168 (A) 70 - 130 mg/dL     Recommended trial of supplements for weight maintenance (but advised to check  carb content and watch blood sugars).  He is up to date with preventive care  No change in medications, no low sugars  Eye Exam- Dr Celis  Ur alb due today  F/u 3-4 months          Relevant Orders    POC Glycosylated Hemoglobin (Hb A1C) (Completed)    POC Glucose Fingerstick (Completed)    Sedimentation Rate       Nervous and Auditory    Arthralgia of hip     He would like to go back and get an opinion from Dr Richardson         Relevant Orders    Ambulatory Referral to Orthopedic Surgery    Ambulatory Referral to Home Health       Genitourinary    Chronic kidney disease, stage IV (severe)     Update cmp   Baseline creat 2.0  Some degree prerenal related to chf  Also ckd related to recurrent nephrolithiasis in past (cured with parathyroidectomy)         Relevant Orders    CBC & Differential    CBC Auto Differential       Other    Dyslipidemia     Good control on current regimen- goal ldl < 70 due to CAD         Chronic midline low back pain without sciatica     [facility] home PT- is essentially homebound          Relevant Orders    PT Consult: Eval & Treat    Ambulatory Referral to Home Health        Return in about 3 months (around 7/6/2017).    Jessica Duong MD

## 2017-04-06 NOTE — ASSESSMENT & PLAN NOTE
Blood sugar and 90 day average sugar reviewed  Results for orders placed or performed in visit on 04/06/17   POC Glycosylated Hemoglobin (Hb A1C)   Result Value Ref Range    Hemoglobin A1C 6.1 %   POC Glucose Fingerstick   Result Value Ref Range    Glucose 168 (A) 70 - 130 mg/dL     Recommended trial of supplements for weight maintenance (but advised to check carb content and watch blood sugars).  He is up to date with preventive care  No change in medications, no low sugars  Eye Exam- Dr Vimal rodríguez due today  F/u 3-4 months

## 2017-04-07 NOTE — TELEPHONE ENCOUNTER
Ok- I think I gave Lindsay an order.  Thanks, [facility]      ----- Message from Nikole Howe sent at 4/7/2017  2:56 PM EDT -----  Contact: VIVIANA WITH [facility]  PATIENT WAS NOT ADMITTED TO  HOME HEALTH BECAUSE HIS NOT HOMEBOUND AND DOES NOT MEET CRITERIA. HE HAS AGREED TO DO PT OUTPATIENT.    VIVIANA #[phone]

## 2017-06-05 PROBLEM — E87.20 LACTIC ACIDOSIS: Status: ACTIVE | Noted: 2017-01-01

## 2017-06-05 PROBLEM — I49.01 CARDIAC ARREST WITH VENTRICULAR FIBRILLATION (HCC): Status: ACTIVE | Noted: 2017-01-01

## 2017-06-05 PROBLEM — I49.9 VENTRICULAR DYSRHYTHMIA: Status: ACTIVE | Noted: 2017-01-01

## 2017-06-05 PROBLEM — R94.31 PROLONGED QT INTERVAL: Status: ACTIVE | Noted: 2017-01-01

## 2017-06-05 PROBLEM — I46.9 CARDIAC ARREST WITH VENTRICULAR FIBRILLATION (HCC): Status: ACTIVE | Noted: 2017-01-01

## 2017-06-05 PROBLEM — I49.01 VENTRICULAR FIBRILLATION (HCC): Status: ACTIVE | Noted: 2017-01-01

## 2017-06-06 NOTE — H&P
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       PROBLEM LIST:  1. CHF:  a. On 03/24/2014, EF less than 20%, moderate MR, mild to moderate TR, RVSP 45-50.   b. Remote angioplasty and MI, greater than 20 years prior, data deficient, historical patient of Dr. Llamas.  c. BNP 5000, most recent 1100 as of April 2014 and 300 as of July 2014.   d. 2015 Pt deferred bi V ICD  2. Hypertension.   3. Dyslipidemia:  A. 3/16 147/124/67/60  4. Diabetes mellitus.   5. CKD, stage 4:  a. Creatinine 2.0.   b. Creatinine 2.2 as of 3/16.  6. Mild dementia.  7. Remote pelvic fracture with chronic hip pain.   8. Remote ankle surgery.   9. Abnormal EKG:  Right bundle branch block secondary to CT change, QRS of 490 milliseconds.        Allergies:  Beta adrenergic blockers; Dipyridamole; and Dobutamine    Home/Current Medications:    Amiodarone 200 mg by mouth daily  Aspirin 81 mg by mouth daily  Vitamin D 3 1000 units daily  Celexa 40 mg one half daily  Lasix 20 mg by mouth daily  Multivitamin daily  Fish oil 1000 mg daily  Prilosec 20 mg daily  Potassium 8 mEq by mouth daily  Pravastatin 20 mg grams by mouth daily  Spironolactone 25 mg by mouth daily.      History of present illness:      According to the family Mr. Mackey is not felt well since Mother's Day.  For the last several days he's been feeling somewhat hot and cold.  His daughter also states that he nearly passed out about a week ago.  This evening he complained of hurting all over and was brought to the emergency room.    In the emergency room the patient became unresponsive.  A monitor rhythm revealed ventricular fibrillation.  CPR was initiated and the patient had a single defibrillation at 200 J with  "conversion to sinus rhythm.  CPR was continued.  The patient had a brief 20 beat run of ventricular fibrillation treated with a bolus of amiodarone.  He reportedly did have a palpable pulse throughout this time.    He has been being admitted with probable sepsis and congestive heart failure    The patient will except defibrillations but does not want intubation or chest compressions.      Cardiac Risk Factors: Hypertension, hyperlipidemia, diabetes mellitus, known coronary artery disease and remote tobacco abuse with cessation at the age of 30.    Social History: The patient is .  He has 2 daughters as well as his wife accompany him in the emergency room.  He quit smoking at the age of 30.  He does not drink alcohol.    Family History: Notable for family history of presumably heart disease in his father who  at the age of 73 and his brother did die of heart disease at 73.  His mother lived to age 94 and his sister lived to the age 98.    Review of Systems  Pertinent positives are listed in the HPI.  All other systems reviewed are negative.         Objective     Vital Sign Min/Max for last 24 hours  Temp  Min: 98.8 °F (37.1 °C)  Max: 98.8 °F (37.1 °C)   BP  Min: 131/86  Max: 144/81   Pulse  Min: 73  Max: 108   Resp  Min: 18  Max: 18   SpO2  Min: 81 %  Max: 93 %   No Data Recorded   Weight  Min: 153 lb (69.4 kg)  Max: 153 lb (69.4 kg)     Flowsheet Rows         First Filed Value    Admission Height  67\" (170.2 cm) Documented at 2017    Admission Weight  153 lb (69.4 kg) Documented at 2017          Physical Exam:    General:Thin elderly white male somewhat agitated but in no acute distress.  HEENT: The sclerae are anicteric.  The oropharynx is clear and moist  CV: Regular rate and rhythm without appreciable murmur gallop or rub heard.  No ectopy is noted at this time.  Resp: Coarse breath sounds in the bases but otherwise clear.  No rales or wheezes are heard.  GI: Soft and nontender.  " Normal bowel sounds are heard.  No appreciable hepatosplenomegaly  : deferred  Musculoskeletal: No gross joint deformities are noted  Skin: Warm and dry  Neurologic: As above slightly agitated but predominantly because he states he has to urinate  Psychiatric: Appropriate mood and affect     EKG:  Normal sinus rhythm with a first-degree AV block and a wide right bundle branch block with a QRS duration of 212 ms.  The QTC is also prolonged at 545 ms    Echo:  20% as of 2014    Labs:      Results from last 7 days  Lab Units 06/05/17 1956   SODIUM mmol/L 140   POTASSIUM mmol/L 3.8   CHLORIDE mmol/L 102   TOTAL CO2 mmol/L 24.0   BUN mg/dL 19   CREATININE mg/dL 1.80*   CALCIUM mg/dL 10.2   BILIRUBIN mg/dL 0.8   ALK PHOS U/L 67   ALT (SGPT) U/L 8   AST (SGOT) U/L 23   GLUCOSE mg/dL 137*     @LABRCNTIP(wbc:3,hgb:3,hct:3,PLT:3,NEUTOPHILPCT:3;LYMPHOPCT:3,MONOPCT  )  Lab Results (last 24 hours)     Procedure Component Value Units Date/Time    CBC & Differential [[id number]] Collected:  06/05/17 1956    Specimen:  Blood Updated:  06/05/17 2011    Narrative:       The following orders were created for panel order CBC & Differential.  Procedure                               Abnormality         Status                     ---------                               -----------         ------                     CBC Auto Differential[[id number]]        Abnormal            Final result                 Please view results for these tests on the individual orders.    CBC Auto Differential [[id number]]  (Abnormal) Collected:  06/05/17 1956    Specimen:  Blood Updated:  06/05/17 2011     WBC 13.46 (H) 10*3/mm3      RBC 4.90 10*6/mm3      Hemoglobin 15.6 g/dL      Hematocrit 48.0 %      MCV 98.0 fL      MCH 31.8 (H) pg      MCHC 32.5 g/dL      RDW 13.6 %      RDW-SD 48.2 fl      MPV 11.6 fL      Platelets 157 10*3/mm3      Neutrophil % 83.3 (H) %      Lymphocyte % 7.0 (L) %      Monocyte % 9.2 %      Eosinophil % 0.2 %      Basophil % 0.1 %       Immature Grans % 0.2 %      Neutrophils, Absolute 11.20 (H) 10*3/mm3      Lymphocytes, Absolute 0.94 10*3/mm3      Monocytes, Absolute 1.24 (H) 10*3/mm3      Eosinophils, Absolute 0.03 (L) 10*3/mm3      Basophils, Absolute 0.02 10*3/mm3      Immature Grans, Absolute 0.03 10*3/mm3     Comprehensive Metabolic Panel [[id number]]  (Abnormal) Collected:  06/05/17 1956    Specimen:  Blood Updated:  06/05/17 2041     Glucose 137 (H) mg/dL      BUN 19 mg/dL      Creatinine 1.80 (H) mg/dL      Sodium 140 mmol/L      Potassium 3.8 mmol/L      Chloride 102 mmol/L      CO2 24.0 mmol/L      Calcium 10.2 mg/dL      Total Protein 7.3 g/dL      Albumin 4.50 g/dL      ALT (SGPT) 8 U/L      AST (SGOT) 23 U/L      Alkaline Phosphatase 67 U/L      Total Bilirubin 0.8 mg/dL      eGFR Non African Amer 36 (L) mL/min/1.73      Globulin 2.8 gm/dL      A/G Ratio 1.6 g/dL      BUN/Creatinine Ratio 10.6     Anion Gap 14.0 (H) mmol/L     Narrative:       [employer] Guidelines    Stage     Description        GFR  1         Normal or High     90+  2         Mild decrease      60-89  3         Moderate decrease  30-59  4         Severe decrease    15-29  5         Kidney failure     <15    Lipase [[id number]]  (Normal) Collected:  06/05/17 1956    Specimen:  Blood Updated:  06/05/17 2041     Lipase 33 U/L     C-reactive Protein [[id number]]  (Abnormal) Collected:  06/05/17 1956    Specimen:  Blood Updated:  06/05/17 2052     C-Reactive Protein 12.50 (H) mg/dL     Blood Culture [[id number]] Collected:  06/05/17 2031    Specimen:  Blood from Arm, Left Updated:  06/05/17 2053    Blood Culture [[id number]] Collected:  06/05/17 2031    Specimen:  Blood from Arm, Right Updated:  06/05/17 2053    Troponin [[id number]]  (Abnormal) Collected:  06/05/17 1956    Specimen:  Blood Updated:  06/05/17 2053     Troponin I 0.494 (H) ng/mL     Narrative:       Ultra Troponin I Reference Range:    <=0.039 ng/mL: Negative  0.04-0.779 ng/mL:  Indeterminate Range. Clinical correlation required.  >=0.78  ng/mL: Consistent with myocardial injury. Clinical correlation required.    Green Top (Gel) [[id number]] Collected:  06/05/17 1956    Specimen:  Blood Updated:  06/05/17 2101     Extra Tube Hold for add-ons.      Auto resulted.       Lavender Top [[id number]] Collected:  06/05/17 1956    Specimen:  Blood Updated:  06/05/17 2101     Extra Tube hold for add-on      Auto resulted       Gold Top - SST [[id number]] Collected:  06/05/17 1956    Specimen:  Blood Updated:  06/05/17 2101     Extra Tube Hold for add-ons.      Auto resulted.       Lactic Acid, Plasma [[id number]]  (Abnormal) Collected:  06/05/17 2031    Specimen:  Blood Updated:  06/05/17 2114     Lactate 2.5 (C) mmol/L       Falsely depressed results may occur on samples drawn from patients receiving N-Acetylcysteine (NAC) or Metamizole.       Protime-INR [[id number]] Collected:  06/05/17 2033    Specimen:  Blood Updated:  06/05/17 2118     Protime 11.5 Seconds      INR 1.05    Narrative:       Therapeutic Ranges for INR: 2.0-3.0 (PT 20-30)                              2.5-3.5 (PT 25-34)    Urine Culture [[id number]] Collected:  06/05/17 2103    Specimen:  Urine from Urine, Catheter Updated:  06/05/17 2127    Urinalysis With / Culture If Indicated [[id number]]  (Abnormal) Collected:  06/05/17 2103    Specimen:  Urine from Urine, Catheter Updated:  06/05/17 2129     Color, UA Yellow     Appearance, UA Clear     pH, UA 6.0     Specific Gravity, UA 1.015     Glucose, UA Negative     Ketones, UA Negative     Bilirubin, UA Negative     Blood, UA Moderate (2+) (A)     Protein, UA 30 mg/dL (1+) (A)     Leuk Esterase, UA Small (1+) (A)     Nitrite, UA Negative     Urobilinogen, UA 0.2 E.U./dL    Urinalysis, Microscopic Only [[id number]]  (Abnormal) Collected:  06/05/17 2103    Specimen:  Urine from Urine, Catheter Updated:  06/05/17 2129     RBC, UA 0-2 /HPF      WBC, UA 21-30 (A) /HPF      Bacteria, UA None Seen  /HPF      Squamous Epithelial Cells, UA 0-2 /HPF      Hyaline Casts, UA 0-6 /LPF      Methodology Automated Microscopy    Troponin [[id number]] Collected:  06/05/17 2157    Specimen:  Blood Updated:  06/05/17 2200    [place] Draw [[id number]] Collected:  06/05/17 1956    Specimen:  Blood Updated:  06/05/17 2201    Narrative:       The following orders were created for panel order [place] Draw.  Procedure                               Abnormality         Status                     ---------                               -----------         ------                     Light Blue Top[[id number]]                                   Final result               Green Top (Gel)[[id number]]                                  Final result               Lavender Top[[id number]]                                     Final result               Gold Top - SST[[id number]]                                   Final result               Green Top (No Gel)[[id number]]                                                            Please view results for these tests on the individual orders.    Light Blue Top [[id number]] Collected:  06/05/17 2033    Specimen:  Blood Updated:  06/05/17 2201     Extra Tube hold for add-on      Auto resulted       BNP [[id number]]  (Abnormal) Collected:  06/05/17 1956    Specimen:  Blood Updated:  06/05/17 2201     BNP 2636.0 (H) pg/mL     Blood Gas, Arterial [[id number]]  (Abnormal) Collected:  06/05/17 2155    Specimen:  Arterial Blood Updated:  06/05/17 2201     Site Arterial: right brachial     Ady's Test N/A     pH, Arterial 7.376 pH units      pCO2, Arterial 27.3 (L) mm Hg      pO2, Arterial 91.7 mm Hg      HCO3, Arterial 16.0 (L) mmol/L      Base Excess, Arterial -8.2 (L) mmol/L      Hemoglobin, Blood Gas 14.2 g/dL      Hematocrit, Blood Gas 43.5 %      Oxyhemoglobin 95.5 %      Methemoglobin 1.0 %      Carboxyhemoglobin 0.8 %      CO2 Content 16.8 (L)     Barometric Pressure for Blood Gas -- mmHg       N/A         Modality non re-breather     FIO2 100 %         Lab Results   Component Value Date    TROPONINI 0.494 (H) 06/05/2017    TROPONINI 0.14 02/20/2014        Lab Results   Component Value Date    CHOL 141 04/06/2017    CHOL 138 11/22/2016     Lab Results   Component Value Date    HDL 62 (H) 04/06/2017    HDL 65 (H) 11/22/2016    HDL 67 (H) 03/14/2016     No results found for: LDLCALC  Lab Results   Component Value Date    TRIG 145 04/06/2017    TRIG 96 11/22/2016    TRIG 124 03/14/2016     No components found for: CHOLHDL  Lab Results   Component Value Date    INR 1.05 06/05/2017    INR 1.35 03/28/2014    INR 1.35 03/27/2014    PROTIME 11.5 06/05/2017    PROTIME 14.7 (H) 03/28/2014    PROTIME 14.7 (H) 03/27/2014       Ejection Fraction:  20%      Results Review:  I reviewed the patients new clinical results.      Assessment:    Ischemic cardiomyopathy   Ventricular fibrillation status post defibrillation June 5, 2017 in emergency room   Last LVEF 20% by echo March 24, 2014   Chronic amiodarone therapy  Congestive heart failure   BNP 2636   Chest x-ray shows pulmonary edema versus early infection infiltrate in the perihilar areas  Sepsis, probable UTI   Lactic acid 2.5   WBC 13,000  Hypertension  Dyslipidemia  Diabetes mellitus  CK D stage IV  Right bundle branch block      Plan:    Amiodarone drip  No Levaquin as the patient's QTC is already prolonged as it is QRS duration  Doxycycline and Zosyn will be administered  The patient will be admitted to ICU  Lidocaine could be used with an initial bolus of 70 mg with a subsequent drip of 1 mg/m if necessary for a short period time.  Caution should be used due to his renal insufficiency.    I discussed the patients findings and my recommendations with patient and the family    Estee Echeverria MD  06/05/17  10:13 PM

## 2017-06-06 NOTE — PROGRESS NOTES
"[facility] at [facility] Progress Note     LOS: 1 day   Patient Care Team:  No Known Provider as PCP - General  Jessica Duong MD as Consulting Physician (Endocrinology)  PCP:  No Known Provider    Chief Complaint:  Fu vf arrest    SUBJECTIVE: Feels some better  Less dyspnea, no appetite      Review of Systems:   All systems have been reviewed and are negative with the exception of those mentioned above.      OBJECTIVE:    Vital Sign Min/Max for last 24 hours  Temp  Min: 98.2 °F (36.8 °C)  Max: 99.5 °F (37.5 °C)   BP  Min: 99/67  Max: 181/132   Pulse  Min: 70  Max: 153   Resp  Min: 16  Max: 20   SpO2  Min: 72 %  Max: 100 %   Flow (L/min)  Min: 2  Max: 6   Weight  Min: 153 lb (69.4 kg)  Max: 153 lb (69.4 kg)     Flowsheet Rows         First Filed Value    Admission Height  67\" (170.2 cm) Documented at 06/05/2017 1944    Admission Weight  153 lb (69.4 kg) Documented at 06/05/2017 1944          Telemetry:  sr      Intake/Output Summary (Last 24 hours) at 06/06/17 1129  Last data filed at 06/06/17 0600   Gross per 24 hour   Intake             1123 ml   Output              300 ml   Net              823 ml     Intake & Output (last 3 days)       06/03 0701 - 06/04 0700 06/04 0701 - 06/05 0700 06/05 0701 - 06/06 0700 06/06 0701 - 06/07 0700    P.O.   360     I.V. (mL/kg)   463 (6.7)     IV Piggyback   300     Total Intake(mL/kg)   1123 (16.2)     Urine (mL/kg/hr)   300     Total Output     300      Net     +823                     Physical Exam:  Physical Exam   Constitutional: He appears well-developed and well-nourished.   Neck: Normal range of motion. Neck supple. No hepatojugular reflux and no JVD present. Carotid bruit is not present. No tracheal deviation present. No thyromegaly present.   Cardiovascular: Normal rate, regular rhythm, S1 normal, S2 normal, intact distal pulses and normal pulses.  PMI is not displaced.  Exam reveals no gallop, no distant heart sounds, no friction rub, no " midsystolic click and no opening snap.    No murmur heard.  Pulses:       Radial pulses are 2+ on the right side, and 2+ on the left side.        Dorsalis pedis pulses are 2+ on the right side, and 2+ on the left side.        Posterior tibial pulses are 2+ on the right side, and 2+ on the left side.   Hypokinetic precordium   Pulmonary/Chest: Effort normal and breath sounds normal. He has no wheezes. He has no rales.   Abdominal: Soft. Bowel sounds are normal. He exhibits no mass. There is no tenderness. There is no guarding.        LABS/DIAGNOSTIC DATA:    Results from last 7 days  Lab Units 06/06/17  0212 06/05/17 1956   WBC 10*3/mm3 18.34* 13.46*   HEMOGLOBIN g/dL 13.2 15.6   HEMATOCRIT % 41.3 48.0   PLATELETS 10*3/mm3 120* 157     No results found for: TROPONINT    Results from last 7 days  Lab Units 06/05/17 2033   INR  1.05       Results from last 7 days  Lab Units 06/06/17  0835 06/06/17  0212 06/05/17  2356 06/05/17 1956   SODIUM mmol/L  --  134 136 140   POTASSIUM mmol/L 3.7 3.5 3.5 3.8   CHLORIDE mmol/L  --  104 102 102   TOTAL CO2 mmol/L  --  23.0 23.0 24.0   BUN mg/dL  --  23 23 19   CREATININE mg/dL  --  1.80* 1.90* 1.80*   CALCIUM mg/dL  --  9.0 8.9 10.2   BILIRUBIN mg/dL  --   --   --  0.8   ALK PHOS U/L  --   --   --  67   ALT (SGPT) U/L  --   --   --  8   AST (SGOT) U/L  --   --   --  23   GLUCOSE mg/dL  --  162* 242* 137*                   Results from last 7 days  Lab Units 06/05/17 1956   BNP pg/mL 2636.0*       Medication Review:     doxycycline 100 mg Intravenous Q12H   heparin (porcine) 5,000 Units Subcutaneous Q12H   insulin lispro 0-9 Units Subcutaneous 4x Daily With Meals & Nightly   pantoprazole 40 mg Intravenous Q AM   sertraline 50 mg Oral Daily        amiodarone 0.5 mg/min Last Rate: 0.5 mg/min (06/06/17 0553)        Principal Problem:    Cardiac arrest with ventricular fibrillation  Active Problems:    Cardiomyopathy    Chronic kidney disease, stage IV (severe)    Atherosclerosis  of coronary artery    Diabetes mellitus    Gastroesophageal reflux disease    Gout    Hyperlipidemia    Hypertension    Urinary tract infection    CHF (congestive heart failure)    Mild dementia    Right bundle branch block    Chronic midline low back pain without sciatica    Lactic acidosis. Resolved    Prolonged QT interval      ASSESSMENT/PLAN:  Dismal EF on echo 15%    Pt/family philosophical re prognosis- doesn't want prolonged death.  AND status  ICU today to tele am if no events  No AV santhosh blocking other than amio w trifascicular lock    Leukocytosis -on broad abx , ask intensivists to disposition          Suhas Monique MD   06/06/17  11:29 AM

## 2017-06-06 NOTE — CONSULTS
"Adult Nutrition  Assessment/PES    Patient Name:  José Miguel Mackey Jr.  YOB: 1929  MRN: [id number]  Admit Date:  6/5/2017    Assessment Date:  6/6/2017   Comments:  RD received MD consult r/t MST >2. Boost Plus added three times daily to encourage po intake of kcals and protein. RD to continue to follow.           Reason for Assessment       06/06/17 1345    Reason for Assessment    Reason For Assessment/Visit multidisciplinary rounds;identified at risk by screening criteria    Identified At Risk By Screening Criteria MST SCORE 2+;unintentional loss of 10 lbs or more in the past 2 mos;reduced oral intake over the last month    Time Spent (min) 30    Diagnosis Diagnosis    Cardiac Cardiomyopathy;CAD;HTN   Vib/Vtach-cardiac arrest, CHF EF <20%, Rt bundle branch block    Endocrine DM Type 2    Infectious Disease UTI    Neurological Dementia    Renal CKD    Skin Pressure ulcer   Stage II pressure ulcer to coccyx   MD diagnosis list  Principal Problem:    Cardiac arrest with ventricular fibrillation  Active Problems:    Cardiomyopathy    Chronic kidney disease, stage IV (severe)    Atherosclerosis of coronary artery    Diabetes mellitus    Gastroesophageal reflux disease    Gout    Hyperlipidemia    Hypertension    Urinary tract infection    CHF (congestive heart failure)    Mild dementia    Right bundle branch block    Chronic midline low back pain without sciatica    Lactic acidosis. Resolved    Prolonged QT interval               Nutrition/Diet History       06/06/17 1349    Nutrition/Diet History    Factors Affecting Nutritional Intake Factors    Reported/Observed By Family    Other Pts family states pt has lost approx 12 lb since November 2016 and has been eating <50% usual po intake r/t poor appetite.             Anthropometrics       06/06/17 1353    Anthropometrics (Special Considerations)    Height Used for Calculations 1.702 m (5' 7\")    Weight Used for Calculations 69.4 kg (153 lb)    RD " Calculated BMI (kg/m2) 23.96    Usual Body Weight (UBW)    Usual Body Weight 74.8 kg (165 lb)    Weight Loss 5.443 kg (12 lb)    % Weight Loss  7 %    Weight Loss Time Frame 7 months    Body Mass Index (BMI)    BMI Grade 19.1 - 24.9 - normal            Labs/Tests/Procedures/Meds       06/06/17 1356    Labs/Tests/Procedures/Meds    Labs/Tests Review Reviewed    Medication Review Reviewed, pertinent   ABX, protonix, SSI     Results from last 7 days  Lab Units 06/06/17  0835 06/06/17  0212  06/05/17  1956   SODIUM mmol/L  --  134  < > 140   POTASSIUM mmol/L 3.7 3.5  < > 3.8   CHLORIDE mmol/L  --  104  < > 102   TOTAL CO2 mmol/L  --  23.0  < > 24.0   BUN mg/dL  --  23  < > 19   CREATININE mg/dL  --  1.80*  < > 1.80*   CALCIUM mg/dL  --  9.0  < > 10.2   BILIRUBIN mg/dL  --   --   --  0.8   ALK PHOS U/L  --   --   --  67   ALT (SGPT) U/L  --   --   --  8   AST (SGOT) U/L  --   --   --  23   GLUCOSE mg/dL  --  162*  < > 137*   < > = values in this interval not displayed.    Intake & Output (last day)       06/05 0701 - 06/06 0700 06/06 0701 - 06/07 0700    P.O. 360     I.V. (mL/kg) 463 (6.7)     IV Piggyback 300     Total Intake(mL/kg) 1123 (16.2)     Urine (mL/kg/hr) 300     Total Output 300      Net +823            Unmeasured Urine Occurrence  2 x    Unmeasured Stool Occurrence  2 x                   Nutrition Prescription Ordered       06/06/17 1358    Nutrition Prescription PO    Current PO Diet Regular    Common Modifiers Cardiac;Consistent Carbohydrate            Evaluation of Received Nutrient/Fluid Intake       06/06/17 1358    PO Evaluation    Number of Days PO Intake Evaluated Insufficient Data              Problem/Interventions:        Problem 1       06/06/17 1358    Nutrition Diagnoses Problem 1    Problem 1 Unintended Weight Loss    Etiology (related to) --   poor appetite/poor po intake    Signs/Symptoms (evidenced by) Unintended Weight Change    Unintended Weight Change Loss    Number of Pounds Lost 12lb    7%    Weight loss time period 7 months                    Intervention Goal       06/06/17 1400    Intervention Goal    General Nutrition support treatment    PO Establish PO            Nutrition Intervention       06/06/17 1400    Nutrition Intervention    RD/Tech Action Follow Tx progress;Care plan reviewd;Supplement provided;Menu provided            Nutrition Prescription       06/06/17 1401    Nutrition Prescription PO    PO Prescription Begin/change supplement    Supplement Boost Plus    Supplement Frequency 3 times a day    New PO Prescription Ordered? Yes            Education/Evaluation       06/06/17 1401    Monitor/Evaluation    Monitor Per protocol;I&O;PO intake;Supplement intake;Weight            Electronically signed by:  Anabella Graff RDN, DANIELLE  06/06/17 2:02 PM

## 2017-06-06 NOTE — ED PROVIDER NOTES
Subjective   HPI Comments: José Miguel Mackey Jr. is a 87 y.o.male who presents to the ED with c/o generalized myalgias. Per the patients wife, the patient has been feeling sick for the past 4 weeks. She reports he began experiencing generalized chest pain last night, which continued today. The patient states he began experiencing generalized myalgias, fever, and chills today, prompting presentation into the ED for evaluation. He c/o nausea, vomiting, and SOA, but denies cough, dysuria, frequency, urgency, diarrhea, bloody stool, sore throat, rhinorrhea, or any other acute complaints at this time.     Patient is a 87 y.o. male presenting with general illness.   History provided by:  Patient and spouse  Illness   Location:  Pt c/o generalized myalgias, fever, chills onset today  Severity:  Moderate  Onset quality:  Sudden  Timing:  Constant  Progression:  Unchanged  Chronicity:  New  Context:  Pts wife reports pt has been feeling sick for the past 4 weeks, with symptoms worsening last night and today  Relieved by:  Nothing  Worsened by:  Nothing  Ineffective treatments:  None tried  Associated symptoms: chest pain, fever, myalgias (generalized), nausea, shortness of breath and vomiting    Associated symptoms: no cough, no rhinorrhea and no sore throat        Review of Systems   Constitutional: Positive for chills and fever.   HENT: Negative for rhinorrhea and sore throat.    Respiratory: Positive for shortness of breath. Negative for cough.    Cardiovascular: Positive for chest pain.   Gastrointestinal: Positive for nausea and vomiting. Negative for blood in stool.   Genitourinary: Negative for dysuria, frequency and urgency.   Musculoskeletal: Positive for myalgias (generalized).   All other systems reviewed and are negative.      Past Medical History:   Diagnosis Date   • CHF (congestive heart failure)    • CKD stage 4 due to type 2 diabetes mellitus    • Diabetes mellitus    • Dyslipidemia    • Gout    • History of  acute myocardial infarction    • Hypertension    • Mild dementia    • Pelvic fracture     Remote pelvic fracture with chronic hip pain.    • Right bundle branch block        Allergies   Allergen Reactions   • Beta Adrenergic Blockers    • Dipyridamole    • Dobutamine        Past Surgical History:   Procedure Laterality Date   • ABDOMINAL SURGERY     • ANKLE SURGERY      Remote ankle surgery.     • CARDIAC SURGERY     • KIDNEY SURGERY         Family History   Problem Relation Age of Onset   • Heart attack Other    • Heart disease Sister    • Heart attack Brother        Social History     Social History   • Marital status:      Spouse name: N/A   • Number of children: N/A   • Years of education: N/A     Social History Main Topics   • Smoking status: Former Smoker   • Smokeless tobacco: Current User     Types: Chew   • Alcohol use No   • Drug use: No   • Sexual activity: Defer     Other Topics Concern   • None     Social History Narrative         Objective   Physical Exam   Constitutional: He is oriented to person, place, and time. No distress.   HENT:   Head: Normocephalic and atraumatic.   Mouth/Throat: No oropharyngeal exudate.   Posterior pharyngeal discharge, no erythema or exudate.    Eyes: Conjunctivae are normal. No scleral icterus.   Neck: Normal range of motion. Neck supple. No JVD present.   No JVD. No adenopathy. No mass.   Cardiovascular: Normal rate, regular rhythm and normal heart sounds.    Pulmonary/Chest: Effort normal and breath sounds normal. No respiratory distress.   Lungs are clear. No tachypnea. No distress.   Abdominal: Soft. Bowel sounds are normal. He exhibits no mass. There is no tenderness. There is no rebound and no guarding.   Abdomen is soft and non-tender. No mass, rebound, or guarding.    Musculoskeletal: Normal range of motion. He exhibits no edema.   No CCorE. No stigmata of DVT. Negative Homans sign. Normal calf squeeze.    Lymphadenopathy:     He has no cervical adenopathy.    Neurological: He is alert and oriented to person, place, and time.   Skin: Skin is warm and dry. He is not diaphoretic. No erythema.   Psychiatric: He has a normal mood and affect. His behavior is normal.   Nursing note and vitals reviewed.      Critical Care  Performed by: MANUEL SMITH  Authorized by: MANUEL SMITH   Total critical care time: 45 minutes  Critical care time was exclusive of separately billable procedures and treating other patients and teaching time.  Critical care was necessary to treat or prevent imminent or life-threatening deterioration of the following conditions: cardiac failure and sepsis.  Critical care was time spent personally by me on the following activities: discussions with consultants, obtaining history from patient or surrogate, evaluation of patient's response to treatment, examination of patient, ordering and performing treatments and interventions, ordering and review of radiographic studies, ordering and review of laboratory studies, pulse oximetry, re-evaluation of patient's condition, development of treatment plan with patient or surrogate and review of old charts.               ED Course  ED Course   Comment By Time   Patient has hx of DM, HTN, chronic kidney disease, CHF, angioplasty, right bundle branch block. EF of less than 20% on 3/24/14. EF less than 20% on latest echo.   -[facility] 06/05 2008   Patient had cough on exam productive of thick white sputum.  I discussed findings with patient and family.  I discussed evaluation here in the ED.  Patient and family are in agreement with the plan of care. Maunel Smith MD 06/05 2014   Dr. Song ukmar hospitalist and Dr. Cox, pulmonologist. [facility] 06/[facility] 2134   Dr. Song Echeverria, cardiologist. [facility] 06/05 2201   Patient became unresponsive in the emergency department.  Monitor rhythm revealed ventricular fibrillation.  CPR was started.  The patient had a  single defibrillation at 200 J with conversion to a sinus rhythm.  CPR was continued and it pulse check the patient had a palpable pulse.  Bag ventilation was maintained throughout the ED event and continued until the patient started to awaken.The patient had a run of ventricular tachycardia treated with a bolus of amiodarone and then a drip of amiodarone as he had a palpable pulse throughout and was alert at that time.  Amiodarone is continued.I discussed the findings at length with the family.  He did have a DNR status but because he didn't one to be probably on life support.  The family reports he does not want to be intubated but they are okay with cardioversion or defibrillation again if needed, but not CPR or intubation.Dr. Cox was in on the discussion with the family.  He'll admit patient to the ICUI discussed findings with Dr. Echeverria will evaluate the patient as well.The patient is alert and is told me his name after the resuscitation.  I discussed the probability of sepsis with the family as well as the CHF.  The patient is appears volume overloaded and therefore additional 30/kg sepsis boluses not given.  He is awaiting ICU transfer. Jv Zuleta MD 06/05 9914   Patient has been seen and evaluated by Dr. Echeverria in ED in addition to the pulmonary/care service.  He is awaiting transfer to the ICU. Jv Zuleta MD 06/05 2990   Family after discussion reports that the patient may have chest compressions were cardioversion/defibrillation, but not intubation Jv Zuleta MD 06/05 3854   Patient is alert and moving.  He is finding it difficult spot to be comfortable but moving all 4 extremities and speaking quite adequately.  Family is in agreement with ICU admission. Jv Zuleta MD 06/05 7501     Recent Results (from the past 24 hour(s))   Comprehensive Metabolic Panel    Collection Time: 06/05/17  7:56 PM   Result Value Ref Range    Glucose 137 (H) 70 - 100 mg/dL    BUN 19 9 - 23  mg/dL    Creatinine 1.80 (H) 0.60 - 1.30 mg/dL    Sodium 140 132 - 146 mmol/L    Potassium 3.8 3.5 - 5.5 mmol/L    Chloride 102 99 - 109 mmol/L    CO2 24.0 20.0 - 31.0 mmol/L    Calcium 10.2 8.7 - 10.4 mg/dL    Total Protein 7.3 5.7 - 8.2 g/dL    Albumin 4.50 3.20 - 4.80 g/dL    ALT (SGPT) 8 7 - 40 U/L    AST (SGOT) 23 0 - 33 U/L    Alkaline Phosphatase 67 25 - 100 U/L    Total Bilirubin 0.8 0.3 - 1.2 mg/dL    eGFR Non African Amer 36 (L) >60 mL/min/1.73    Globulin 2.8 gm/dL    A/G Ratio 1.6 1.5 - 2.5 g/dL    BUN/Creatinine Ratio 10.6 7.0 - 25.0    Anion Gap 14.0 (H) 3.0 - 11.0 mmol/L   Lipase    Collection Time: 06/05/17  7:56 PM   Result Value Ref Range    Lipase 33 6 - 51 U/L   Green Top (Gel)    Collection Time: 06/05/17  7:56 PM   Result Value Ref Range    Extra Tube Hold for add-ons.    Lavender Top    Collection Time: 06/05/17  7:56 PM   Result Value Ref Range    Extra Tube hold for add-on    Gold Top - SST    Collection Time: 06/05/17  7:56 PM   Result Value Ref Range    Extra Tube Hold for add-ons.    CBC Auto Differential    Collection Time: 06/05/17  7:56 PM   Result Value Ref Range    WBC 13.46 (H) 3.50 - 10.80 10*3/mm3    RBC 4.90 4.20 - 5.76 10*6/mm3    Hemoglobin 15.6 13.1 - 17.5 g/dL    Hematocrit 48.0 38.9 - 50.9 %    MCV 98.0 80.0 - 99.0 fL    MCH 31.8 (H) 27.0 - 31.0 pg    MCHC 32.5 32.0 - 36.0 g/dL    RDW 13.6 11.3 - 14.5 %    RDW-SD 48.2 37.0 - 54.0 fl    MPV 11.6 6.0 - 12.0 fL    Platelets 157 150 - 450 10*3/mm3    Neutrophil % 83.3 (H) 41.0 - 71.0 %    Lymphocyte % 7.0 (L) 24.0 - 44.0 %    Monocyte % 9.2 0.0 - 12.0 %    Eosinophil % 0.2 0.0 - 3.0 %    Basophil % 0.1 0.0 - 1.0 %    Immature Grans % 0.2 0.0 - 0.6 %    Neutrophils, Absolute 11.20 (H) 1.50 - 8.30 10*3/mm3    Lymphocytes, Absolute 0.94 0.60 - 4.80 10*3/mm3    Monocytes, Absolute 1.24 (H) 0.00 - 1.00 10*3/mm3    Eosinophils, Absolute 0.03 (L) 0.10 - 0.30 10*3/mm3    Basophils, Absolute 0.02 0.00 - 0.20 10*3/mm3    Immature Grans,  Absolute 0.03 0.00 - 0.03 10*3/mm3   C-reactive Protein    Collection Time: 06/05/17  7:56 PM   Result Value Ref Range    C-Reactive Protein 12.50 (H) 0.00 - 1.00 mg/dL   Troponin    Collection Time: 06/05/17  7:56 PM   Result Value Ref Range    Troponin I 0.494 (H) <=0.039 ng/mL   BNP    Collection Time: 06/05/17  7:56 PM   Result Value Ref Range    BNP 2636.0 (H) 0.0 - 100.0 pg/mL   Lactic Acid, Plasma    Collection Time: 06/05/17  8:31 PM   Result Value Ref Range    Lactate 2.5 (C) 0.5 - 2.0 mmol/L   Protime-INR    Collection Time: 06/05/17  8:33 PM   Result Value Ref Range    Protime 11.5 9.6 - 11.5 Seconds    INR 1.05    Light Blue Top    Collection Time: 06/05/17  8:33 PM   Result Value Ref Range    Extra Tube hold for add-on    Urinalysis With / Culture If Indicated    Collection Time: 06/05/17  9:03 PM   Result Value Ref Range    Color, UA Yellow Yellow, Straw    Appearance, UA Clear Clear    pH, UA 6.0 5.0 - 8.0    Specific Gravity, UA 1.015 1.001 - 1.030    Glucose, UA Negative Negative    Ketones, UA Negative Negative    Bilirubin, UA Negative Negative    Blood, UA Moderate (2+) (A) Negative    Protein, UA 30 mg/dL (1+) (A) Negative    Leuk Esterase, UA Small (1+) (A) Negative    Nitrite, UA Negative Negative    Urobilinogen, UA 0.2 E.U./dL 0.2 - 1.0 E.U./dL   Urinalysis, Microscopic Only    Collection Time: 06/05/17  9:03 PM   Result Value Ref Range    RBC, UA 0-2 None Seen, 0-2 /HPF    WBC, UA 21-30 (A) None Seen /HPF    Bacteria, UA None Seen None Seen, Trace /HPF    Squamous Epithelial Cells, UA 0-2 None Seen, 0-2 /HPF    Hyaline Casts, UA 0-6 0 - 6 /LPF    Methodology Automated Microscopy    Blood Gas, Arterial    Collection Time: 06/05/17  9:55 PM   Result Value Ref Range    Site Arterial: right brachial     Ady's Test N/A     pH, Arterial 7.376 7.350 - 7.450 pH units    pCO2, Arterial 27.3 (L) 35.0 - 48.0 mm Hg    pO2, Arterial 91.7 83.0 - 108.0 mm Hg    HCO3, Arterial 16.0 (L) 20.0 - 26.0 mmol/L     Base Excess, Arterial -8.2 (L) 0.0 - 2.0 mmol/L    Hemoglobin, Blood Gas 14.2 13.5 - 17.5 g/dL    Hematocrit, Blood Gas 43.5 %    Oxyhemoglobin 95.5 94 - 99 %    Methemoglobin 1.0 0 - 1.5 %    Carboxyhemoglobin 0.8 0 - 2 %    CO2 Content 16.8 (L) 22 - 33    Barometric Pressure for Blood Gas  mmHg    Modality non re-breather     FIO2 100 %     Note: In addition to lab results from this visit, the labs listed above may include labs taken at another facility or during a different encounter within the last 24 hours. Please correlate lab times with ED admission and discharge times for further clarification of the services performed during this visit.    XR Chest 1 View   Final Result   Abnormal   1.  Patchy perihilar pulmonary opacities and few air bronchograms, pulmonary    edema versus early infectious infiltrate.     2.  Moderate cardiomediastinal enlargement, correlate for cardiomegaly versus    pericardial effusion.      THIS DOCUMENT HAS BEEN ELECTRONICALLY SIGNED BY JAISON PRICE MD        Vitals:    06/05/17 2200 06/05/17 2205 06/05/17 2210 06/05/17 2215   BP: 107/91 (!) 118/107 138/85 128/93   Pulse: 99 99 93 87   Resp:       Temp:       SpO2: 100% 100% 99% 97%   Weight:       Height:         Medications   sodium chloride 0.9 % flush 10 mL (not administered)   piperacillin-tazobactam (ZOSYN) 4.5 g/100 mL 0.9% NS IVPB (mbp) (not administered)   sodium chloride 0.9 % flush 1-10 mL (not administered)   heparin (porcine) 5000 UNIT/ML injection 5,000 Units (not administered)   pantoprazole (PROTONIX) injection 40 mg (not administered)   piperacillin-tazobactam (ZOSYN) 2.25 g/100 mL 0.9% NS IVPB (mbp) (not administered)   doxycycline (VIBRAMYCIN) 100 mg/100 mL 0.9% NS MBP (not administered)   metoclopramide (REGLAN) injection 5 mg (not administered)   amiodarone (CORDARONE) injection 150 mg (150 mg Intravenous Given 6/5/17 2142)   EPINEPHrine (ADRENALIN) injection 1 mg (1 mg Intravenous Given 6/5/17 2138)      ECG/EMG Results (last 24 hours)     Procedure Component Value Units Date/Time    ECG 12 Lead [[id number]] Collected:  06/05/17 1947     Updated:  06/05/17 2049    Narrative:       Test Reason : Upper Abdominal triage protocol  Blood Pressure : **/** mmHG  Vent. Rate : 092 BPM     Atrial Rate : 092 BPM     P-R Int : 198 ms          QRS Dur : 212 ms      QT Int : 540 ms       P-R-T Axes : 036 263 065 degrees     QTc Int : 667 ms    Normal sinus rhythm  Right bundle branch block  Inferior infarct , age undetermined  Anteroseptal infarct (cited on or before 05-MAR-2014)  Abnormal ECG  When compared with ECG of 17-JUL-2014 15:52,  Previous ECG has undetermined rhythm, needs review  Questionable change in initial forces of Septal leads  T wave inversion now evident in Anterior leads  QT has lengthened  Confirmed by LUIS KOTHARI, CHRISTOPHER ([id number]) on 6/5/2017 8:49:12 PM    Referred By:  EDMD           Confirmed By:CHRISTOPHER ZULETA MD                  MDM  Number of Diagnoses or Management Options  Acute UTI:   Cardiac arrest with ventricular fibrillation:   Chronic systolic congestive heart failure:   Do-not-intubate resuscitation status:   Sepsis, due to unspecified organism:      Amount and/or Complexity of Data Reviewed  Decide to obtain previous medical records or to obtain history from someone other than the patient: yes    Critical Care  Total time providing critical care: 30-74 minutes      Final diagnoses:   Cardiac arrest with ventricular fibrillation   Acute UTI   Chronic systolic congestive heart failure   Sepsis, due to unspecified organism   Do-not-intubate resuscitation status       Documentation assistance provided by hanh Morocho.  Information recorded by the scribe was done at my direction and has been verified and validated by me.     Seda Morocho  06/05/17 2033     Seda Morocho  06/05/17 2234       Jv Zuleta MD  06/05/17 2246

## 2017-06-06 NOTE — PROGRESS NOTES
Intensivist Note     6/6/2017  Hospital Day: 1      Mr. José Miguel Mackey Jr., 87 y.o. male is followed for:  Principal Problem:    Cardiac arrest with ventricular fibrillation  Active Problems:    Cardiomyopathy    Chronic kidney disease, stage IV (severe)    Atherosclerosis of coronary artery    CHF (congestive heart failure)    Right bundle branch block    Prolonged QT interval    Diabetes mellitus    Hyperlipidemia    Gastroesophageal reflux disease    Gout    Hypertension    Urinary tract infection    Mild dementia    Chronic midline low back pain without sciatica    Lactic acidosis. Resolved       SUBJECTIVE     Subjective    87-year-old white male with a history of coronary artery disease, ischemic cardiomyopathy with EF 20% and congestive heart failure (previously refused BIV PPM), right bundle branch block, prolonged QT interval, diabetes mellitus, chronic kidney disease stage IV, retention, hyperlipidemia, mild dementia, and gout. Noted chest pain 6/4/17 it would come and go. The evening of 6/5/17 presented because of the above chest discomfort and subjective fever and chills. He went into ventricular atrial fibrillation while in the ER and was cardioverted. He then had a 20 beat run of V. tach had a pulse and was begun on amiodarone therapy. Dr. Echeverria admitted the patient and we saw him after arrival in the ICU. He was placed on amiodarone drip, and all meds that could lengthen QT interval were avoided. He was empirically begun on antimicrobial therapy with Zosyn and doxycycline because his blood culture revealed some gram-positive cocci. This is most likely contaminant but his chest x-ray showed some increased markings (most likely congestive heart failure as BNP is greater than 2000) and white count was 18,000, so antibiotics will be continued for now.    Urine output is reduced, but creatinine is stable at 1.8. He feels less short of breath today and denies any chest pain, nausea, vomiting,  "diaphoresis. Denies edema    The patient's relevant past medical, surgical and social history were reviewed and updated in Epic as appropriate.    OBJECTIVE     /72  Pulse 68  Temp 98.2 °F (36.8 °C) (Core)   Resp 18  Ht 67\" (170.2 cm)  Wt 153 lb (69.4 kg)  SpO2 96%  BMI 23.96 kg/m2  Flow (L/min): 2    Flowsheet Rows         First Filed Value    Admission Height  67\" (170.2 cm) Documented at 06/05/2017 1944    Admission Weight  153 lb (69.4 kg) Documented at 06/05/2017 1944        Intake & Output (last day)       06/05 0701 - 06/06 0700 06/06 0701 - 06/07 0700    P.O. 360     I.V. (mL/kg) 463 (6.7)     IV Piggyback 300     Total Intake(mL/kg) 1123 (16.2)     Urine (mL/kg/hr) 300 100 (0.2)    Stool  0 (0)    Total Output 300 100    Net +823 -100          Unmeasured Urine Occurrence  2 x    Unmeasured Stool Occurrence  2 x          Objective    Exam:  General Exam:  Elderly white male in no acute distress  HEENT: Pupils equal and reactive. Nose and throat clear.  Neck:                          Supple, no JVD, thyromegaly, or adenopathy  Lungs: Clear anteriorly and laterally  Cardiovascular: Regular rate and rhythm without murmurs or gallops. Heart rate 70  Abdomen: Soft nontender without organomegaly or masses.   and rectal: Deferred.  Extremities: No cyanosis clubbing or edema.  Neurologic:                 Symmetric strength. No focal deficits. Oriented    Chest X-Ray: No film today and previous film just showed some congestive changes    INFUSIONS    amiodarone 0.5 mg/min Last Rate: 0.5 mg/min (06/06/17 0553)         Results from last 7 days  Lab Units 06/06/17  0212 06/05/17  1956   WBC 10*3/mm3 18.34* 13.46*   HEMOGLOBIN g/dL 13.2 15.6   HEMATOCRIT % 41.3 48.0   PLATELETS 10*3/mm3 120* 157       Results from last 7 days  Lab Units 06/06/17  0835 06/06/17  0212 06/05/17  2356   SODIUM mmol/L  --  134 136   POTASSIUM mmol/L 3.7 3.5 3.5   CHLORIDE mmol/L  --  104 102   TOTAL CO2 mmol/L  --  23.0 23.0 "   BUN mg/dL  --  23 23   CREATININE mg/dL  --  1.80* 1.90*   GLUCOSE mg/dL  --  162* 242*   CALCIUM mg/dL  --  9.0 8.9       Results from last 7 days  Lab Units 06/06/17  0835 06/06/17  0212   MAGNESIUM mg/dL 2.2 1.7   PHOSPHORUS mg/dL  --  2.4       Lab Results   Component Value Date    SEDRATE <1 04/06/2017     Lab Results   Component Value Date    BNP 2636.0 (H) 06/05/2017     Lab Results   Component Value Date    TROPONINI 0.751 (H) 06/06/2017     Lab Results   Component Value Date    TSH 0.738 04/06/2017     Lab Results   Component Value Date    LACTATE 1.8 06/06/2017     No results found for: CORTISOL      Results from last 7 days  Lab Units 06/05/17  2155   PH, ARTERIAL pH units 7.376   PCO2, ARTERIAL mm Hg 27.3*   PO2 ART mm Hg 91.7   HCO3 ART mmol/L 16.0*   FIO2 % 100         I reviewed the patient's results, images and medication.    Assessment/Plan   ASSESSMENT      Principal Problem:    Cardiac arrest with ventricular fibrillation  Active Problems:    Cardiomyopathy    Chronic kidney disease, stage IV (severe)    Atherosclerosis of coronary artery    CHF (congestive heart failure)    Right bundle branch block    Prolonged QT interval    Diabetes mellitus    Hyperlipidemia    Gastroesophageal reflux disease    Gout    Hypertension    Urinary tract infection    Mild dementia    Chronic midline low back pain without sciatica    Lactic acidosis. Resolved      DISCUSSION: Actually looks good although echocardiogram reveals an EF of only 15%. Dr. Monique discussed the situation with family again and they made it clear he wants to remain a DO NOT RESUSCITATE. At the present time I think it unlikely he has a septicemia and likely the positive blood culture is a contaminant. Studies to date indicate that the Gram-positive in his blood culture is not a group A or B strep, or pneumococcus. I am going to leave him on doxycycline but we will stop the Zosyn    PLAN     1. Continue amiodarone drip  2. Chest x-ray in  a.m.  3. DC Zosyn but continue doxycycline  4. Follow-up WBC in a.m. as well as renal function  5. To telemetry tomorrow    Plan of care and goals reviewed with mulitdisciplinary team at daily rounds.    I discussed the patient's findings and my recommendations with patient, family, nursing staff and primary care team    Time spent Critical care 25 min (It does not include procedure time).    Asael Bess MD  Intensive Care Medicine  06/06/17 3:37 PM

## 2017-06-06 NOTE — PLAN OF CARE
Problem: Patient Care Overview (Adult)  Goal: Plan of Care Review  Outcome: Ongoing (interventions implemented as appropriate)    06/06/17 1744   Coping/Psychosocial Response Interventions   Plan Of Care Reviewed With patient   Patient Care Overview   Progress improving   Outcome Evaluation   Outcome Summary/Follow up Plan Patient made AND. EF 15%         Problem: Pressure Ulcer (Adult)  Goal: Signs and Symptoms of Listed Potential Problems Will be Absent or Manageable (Pressure Ulcer)  Outcome: Ongoing (interventions implemented as appropriate)    06/06/17 1744   Pressure Ulcer   Problems Assessed (Pressure Ulcer) pain   Problems Present (Pressure Ulcer) pain         Problem: Arrhythmia/Dysrhythmia (Symptomatic) (Adult)  Goal: Signs and Symptoms of Listed Potential Problems Will be Absent or Manageable (Arrhythmia/Dysrhythmia)  Outcome: Ongoing (interventions implemented as appropriate)

## 2017-06-06 NOTE — PLAN OF CARE
Problem: Patient Care Overview (Adult)  Goal: Plan of Care Review  Outcome: Ongoing (interventions implemented as appropriate)    06/06/17 0354   Coping/Psychosocial Response Interventions   Plan Of Care Reviewed With patient;spouse;family   Patient Care Overview   Progress no change   Outcome Evaluation   Outcome Summary/Follow up Plan Pt. came in by ambulance feeling ill. Went into v-fib in ER and received a shock and compressions. Started on doxycycline and zosyn. Only wants IV medications in the case of lethal dysrhythmia         Problem: Pressure Ulcer (Adult)  Goal: Signs and Symptoms of Listed Potential Problems Will be Absent or Manageable (Pressure Ulcer)  Outcome: Ongoing (interventions implemented as appropriate)    06/06/17 0354   Pressure Ulcer   Problems Assessed (Pressure Ulcer) all   Problems Present (Pressure Ulcer) none         Problem: Arrhythmia/Dysrhythmia (Symptomatic) (Adult)  Goal: Signs and Symptoms of Listed Potential Problems Will be Absent or Manageable (Arrhythmia/Dysrhythmia)  Outcome: Ongoing (interventions implemented as appropriate)    06/06/17 0354   Arrhythmia/Dysrhythmia (Symptomatic)   Problems Assessed (Arrhythmia/Dysrhythmia) all   Problems Present (Arrhythmia/Dysrhythmia) situational response

## 2017-06-06 NOTE — CONSULTS
Consults     Pulmonary/Critical Care History and Physical Exam     LOS: 0 days   Patient Care Team:  No Known Provider as PCP - General  Jessica Duong MD as Consulting Physician (Endocrinology)    Chief Complaint:  Ventricular Fibrillation/ cardiac arrest    Subjective     HPI: José Miguel Mackey Jr. is a 87 y.o. male who  has a past medical history of CHF (congestive heart failure); CKD stage 4 due to type 2 diabetes mellitus; Diabetes mellitus; Dyslipidemia; Gout; History of acute myocardial infarction; Hypertension; Mild dementia; Pelvic fracture; and Right bundle branch block.  He has a known EF of 20% and has previously refused placement of a BiV pacer.     Over the last 4 weeks the patient has been having c/o myalgia, and fatigue.  He had a near-syncopal episode about a week ago.  On the evening of 6/4 he developed a sense of CP which has continued through to today.  Today he has developed subjective F/C (based on patient having tremors per wife), N/V, and SOA.  For these reasons he presented to our ED.  While in our ED he went into VF which converted after ECV x1.  Later he had a 20 beat run of VT w/ a pulse.  He was started on Amiodarone IV therapy and Dr. Echeverria w/ Cardiology was notified to admit the patient.  The patient is being admitted to the ICU where we encounter them.      History taken from: patient    Past Medical History:   Diagnosis Date   • CHF (congestive heart failure)    • CKD stage 4 due to type 2 diabetes mellitus    • Diabetes mellitus    • Dyslipidemia    • Gout    • History of acute myocardial infarction    • Hypertension    • Mild dementia    • Pelvic fracture     Remote pelvic fracture with chronic hip pain.    • Right bundle branch block        Past Surgical History:   Procedure Laterality Date   • ABDOMINAL SURGERY     • ANKLE SURGERY      Remote ankle surgery.     • CARDIAC SURGERY     • KIDNEY SURGERY         Family History   Problem Relation Age of Onset   • Heart  attack Other    • Heart disease Sister    • Heart attack Brother        Social History     Social History   • Marital status:      Spouse name: N/A   • Number of children: N/A   • Years of education: N/A     Occupational History   • Not on file.     Social History Main Topics   • Smoking status: Former Smoker   • Smokeless tobacco: Current User     Types: Chew   • Alcohol use No   • Drug use: No   • Sexual activity: Defer     Other Topics Concern   • Not on file     Social History Narrative       Allergies   Allergen Reactions   • Beta Adrenergic Blockers    • Dipyridamole    • Dobutamine        PMH/FH/SocH were reviewed by me and updates were made.     Review of Systems:    All systems were reviewed and negative except as noted in subjective.  Constitution:  positive for See HPI  Respiratory: positive for  See HPI  Cardiovascular: positive for  See HPI  Gastrointestinal: postitive for  See HPI  Musculoskeletal: positive for  See HPI    Objective     Vital Signs  Temp:  [98.8 °F (37.1 °C)] 98.8 °F (37.1 °C)  Heart Rate:  [] 87  Resp:  [18] 18  BP: (107-181)/() 128/93    Physical Exam:     General Appearance:    Alert, cooperative, in no acute distress   Head:    Normocephalic, without obvious abnormality, atraumatic   Eyes:            Lids and lashes normal, conjunctivae and sclerae normal, no   icterus, no pallor, corneas clear, PERRLA   ENMT:   Ears appear intact with no abnormalities noted      No oral lesions, no thrush, oral mucosa moist   Neck:   No adenopathy, supple, trachea midline, no thyromegaly, no   carotid bruit, no JVD       Lungs/resp:     Normal effort, symmetric chest rise, no crepitus, clear to      auscultation bilaterally, no chest wall tenderness, resonant to percussion throughout.                  Heart/CV:    Regular rhythm and normal rate, normal S1 and S2, no            murmur, no gallop, no rub, no click   Abdomen/GI:     Normal bowel sounds, no masses, no organomegaly,  soft        non-tender, non-distended, no guarding, no rebound                tenderness   G/U:     Deferred   Extremities/MSK:   No clubbing, cyanosis or edema.  No deformities.    Pulses:   Pulses palpable and equal bilaterally   Skin:   No bleeding, bruising or rash   Hem/Lymph:   No cervical or supraclavicular adenopathy.    Neurologic:   Moves all extremities with no obvious focal motor deficit.  Cranial nerves 2 - 12 grossly intact            Psychiatric:  Normal mood and affect, oriented x 3.      Results Review:     I reviewed the patient's new clinical results.     Results from last 7 days  Lab Units 06/05/17 1956   SODIUM mmol/L 140   POTASSIUM mmol/L 3.8   CHLORIDE mmol/L 102   TOTAL CO2 mmol/L 24.0   BUN mg/dL 19   CREATININE mg/dL 1.80*   CALCIUM mg/dL 10.2   BILIRUBIN mg/dL 0.8   ALK PHOS U/L 67   ALT (SGPT) U/L 8   AST (SGOT) U/L 23   GLUCOSE mg/dL 137*       Results from last 7 days  Lab Units 06/05/17 1956   WBC 10*3/mm3 13.46*   HEMOGLOBIN g/dL 15.6   HEMATOCRIT % 48.0   PLATELETS 10*3/mm3 157       Results from last 7 days  Lab Units 06/05/17  2155   PH, ARTERIAL pH units 7.376   PO2 ART mm Hg 91.7   PCO2, ARTERIAL mm Hg 27.3*   HCO3 ART mmol/L 16.0*     Troponin : 0.494 > 0.416        I reviewed the patient's new imaging including images and reports    CXR was read as:   IMPRESSION:  1. Patchy perihilar pulmonary opacities and few air bronchograms, pulmonary   edema versus early infectious infiltrate.   2. Moderate cardiomediastinal enlargement, correlate for cardiomegaly versus   pericardial effusion.    EKG - 6/5/17 @ 1947  Test Reason : Upper Abdominal triage protocol  Blood Pressure : **/** mmHG  Vent. Rate : 092 BPM     Atrial Rate : 092 BPM     P-R Int : 198 ms          QRS Dur : 212 ms      QT Int : 540 ms       P-R-T Axes : 036 263 065 degrees     QTc Int : 667 ms    Normal sinus rhythm  Right bundle branch block  Inferior infarct , age undetermined  Anteroseptal infarct (cited on or  before 05-MAR-2014)  Abnormal ECG  When compared with ECG of 17-JUL-2014 15:52,  Previous ECG has undetermined rhythm, needs review  Questionable change in initial forces of Septal leads  T wave inversion now evident in Anterior leads    Medication Review:     doxycycline 100 mg Intravenous Q12H   [START ON 6/6/2017] heparin (porcine) 5,000 Units Subcutaneous Q12H   [START ON 6/6/2017] pantoprazole 40 mg Intravenous Q AM   [START ON 6/6/2017] piperacillin-tazobactam 2.25 g Intravenous Q6H   piperacillin-tazobactam 4.5 g Intravenous Once          Assessment/Plan     Hospital Problem List     * (Principal)Ventricular Fib/Tach    CHF (congestive heart failure)    Overview Signed 11/2/2016  9:04 AM by Elian Naik     1. CHF:  a. On 03/24/2014, EF less than 20%, moderate MR, mild to moderate TR, RVSP 45-50.    b. Remote angioplasty and MI, greater than 20 years prior, data deficient, historical patient of Dr. Llamas.  c. BNP 5000, most recent 1100 as of April 2014 and 300 as of July 2014.             Cardiomyopathy    Overview Signed 6/16/2016  7:18 PM by Jessica Duong MD     Impression: 12/02/2014 - sees Dr Monique- doing well  Impression: 04/07/2014 - sees  cardiology next month; Description: ef less than 20%,, elevated right heart pressures         Chronic kidney disease, stage IV (severe)    Overview Addendum 11/2/2016  9:06 AM by Elian Naik     Impression: 08/31/2015 - update cmp  Impression: 12/02/2014 - update creatinine  Impression: 07/30/2014 - update cmp  Impression: 04/07/2014 - reviewed un and creatinine  have asked him to increase fluids  Impression: 03/17/2014 - update creat, increase diuretic due to edema;     2. CKD, stage 4:  a. Creatinine 2.0.   b. Creatinine 2.2 as of December 2014.           Atherosclerosis of coronary artery    Overview Signed 6/16/2016  7:18 PM by Jessica Duong MD     Description: [facility]- now Dr Bray- ed 35%, stress test anteroapical scar, hypokinesis  Aortic  sclerosis, mitral calcificat- annular  4/4 echo         Diabetes mellitus    Overview Signed 6/16/2016  7:18 PM by Jessica Duong MD     Impression: 08/31/2015 - blood sugar is 169, hgn a1c 6.1%  is up to date with eye exam  no change neuropathy  ur alb neg  Impression: 04/07/2015 - blood sugar is 129, hgn a1c 6.1% (average 135)  is up to date with eye exam  no low sugars  foot care is good  decreased pulses   ckd with neg alb   bp is good  Impression: 12/02/2014 - blood sugar and 90 day average sugar are good   nephropathy stable  neuropathy multifactorial- continue to monitor   is up to date with eye exam  no hypoglycemia  follow  Impression: 07/30/2014 - blood sugar and average sugar are good  has nephropathy, continue to monitor  stopped ace due to low bp and orthostasis  discussed with patient  snack or juice if shaky- concerned about low sugar;          Right bundle branch block    Overview Signed 11/2/2016  9:05 AM by Elian Naik     3. Abnormal EKG:  a. Right bundle branch block secondary to CT change, QRS of 490 milliseconds.             Lactic acidosis    Prolonged QT interval    Gastroesophageal reflux disease    Hypertension    Overview Signed 6/16/2016  7:18 PM by Jessica Duong MD     Impression: 08/31/2015 - bp is good  Impression: 12/02/2014 - bp is good  Impression: 07/30/2014 - bp is good, orthostatic changes- discussed increase po intake and slow change in posture  Impression: 02/20/2014 - bp is good currently;          Urinary tract infection    Overview Signed 6/16/2016  7:18 PM by Jessica Duong MD     Impression: 07/30/2014 - check ua;          Chronic midline low back pain without sciatica    Gout    Hyperlipidemia    Overview Signed 6/16/2016  7:18 PM by Jessica Duong MD     Impression: 08/31/2015 - check flp  Impression: 04/07/2015 - reviewed last lipids with him along with targets for chol with h/o cad  Impression: 12/02/2014 - check flp  Impression:  07/30/2014 - check flp;          Mild dementia    Cardiac arrest with ventricular fibrillation        Patient admitted with v-fib arrest, CHF and UTI.  Known prior CHF with LVEF < 20% who previously refused BiV pacer.  Patient is currently DNR/DNI with no CPR or endotracheal intubation.  Cardiology is following.     Plan:  - ICU  - Amiodarone gtt  - GI & DVT PPX  - SSI  - Panculture, Doxy/Zosyn  - Avoid medications that may lengthen QT interval  - monitor renal function, family does not wish for him to have a FC at this point  - NO CPR, NO INTUBATION.  Family is okay w/ ACLS medications and ECV if needed  - repeat troponin, EKG, and LA in 6 hours  - further plan per primary service  - repeat procalcitonin in am.   - follow CXR.   - hopefully can de-escalate / discontinue antibiotics soon to cover UTI.         SEBASTIAN Lima  06/05/17  10:49 PM  Hammad Cox MD    I performed an independent history and physical examination. Portions of the history were obtained by SEBASTIAN Small, DNP and were modified by me according to my findings. The above note reflects my findings, assessment, and plan.    Hammad Cox MD    35 minutes of critical care provided on 6/5/17. This time excludes other billable procedures.  None of my critical care time was concurrent with other critical care providers.  Time does include preparation of documents, medical consultations, review of old records, and direct bedside care. Patient was at high risk for life-threatening deterioration due to cardiac arrest.     I discussed the case with the patient, his family including his wife, Dr. Zuleta and Dr. Echeverria.

## 2017-06-06 NOTE — PROGRESS NOTES
"Discharge Planning Assessment  [facility]     Patient Name: José Miguel Mcakey Jr.  MRN: [id number]  Today's Date: 6/6/2017    Admit Date: 6/5/2017          Discharge Needs Assessment       06/06/17 1357    Living Environment    Lives With spouse    Living Arrangements house    Home Accessibility no concerns    Transportation Available car;family or friend will provide    Living Environment    Provides Primary Care For no one    Quality Of Family Relationships supportive    Able to Return to Prior Living Arrangements yes    Discharge Needs Assessment    Concerns To Be Addressed discharge planning concerns    Readmission Within The Last 30 Days no previous admission in last 30 days    Equipment Currently Used at Home cane, straight;oxygen;glucometer   oxygen prn with acitivity and HS, has portable tanks, Provided by [employer]    Equipment Needed After Discharge none    Discharge Facility/Level Of Care Needs home with home health    Current Discharge Risk chronically ill    Discharge Disposition home or self-care;home healthcare service    Discharge Contact Information if Applicable [phone]    Discharge Planning Comments home with wife to [facility]]            Discharge Plan       06/06/17 1353    Case Management/Social Work Plan    Plan home with wife to [facility]]    Patient/Family In Agreement With Plan yes    Additional Comments Mr. Mackey lives with his wife, Leonie who is currently at bedside, in [facility]. His daughter is also at bedside. They all 3 state that Mr. Mackey is typically fairly independent at home, he goes out and drives to his sons' farms periodically throughout the day, but the majority of his day is spent sitting at home or a vehicle and this is \"why he has a bed sore on his bottom\" per his wife. Wife states this has been there for several months. They all state however that he has progressively gotten weaker over the past few weeks. Mr. Mackey had followed with the HF " clinic, [facility], in the past and would like to be reconnected with them. CM consulted HF clinic. Mr. Mackey would like to have home health for skilled nsg, PT and telehealth. From the list of agencies provided, he and wife stated they want to use [facility] if it is ordered. He currently uses [employer] for oxygen supplies. Mr. Mackey could benefit from PT and OT when medically able to participate. CM will continue to follow. No other needs or concerns noted at this time.         Discharge Placement     No information found        Expected Discharge Date and Time     Expected Discharge Date Expected Discharge Time    Aris 10, 2017               Demographic Summary       06/06/17 1351    Referral Information    Admission Type inpatient    Arrived From home or self-care    Referral Source admission list;physician    Reason For Consult discharge planning    Record Reviewed clinical discipline documentation;history and physical;medical record    Contact Information    Permission Granted to Share Information With     Primary Care Physician Information    Name Jessica Luis            Functional Status       06/06/17 6594    Functional Status Current    Ambulation 2-->assistive person    Transferring 2-->assistive person    Toileting 2-->assistive person    Bathing 2-->assistive person    Dressing 2-->assistive person    Eating 0-->independent    Communication 0-->understands/communicates without difficulty    Swallowing (if score 2 or more for any item, consult Rehab Services) 0-->swallows foods/liquids without difficulty    Change in Functional Status Since Onset of Current Illness/Injury yes    Functional Status Prior    Ambulation 1-->assistive equipment    Transferring 1-->assistive equipment    Toileting 1-->assistive equipment    Bathing 0-->independent    Dressing 0-->independent    Eating 0-->independent    Communication 0-->understands/communicates without difficulty    Swallowing  0-->swallows foods/liquids without difficulty    IADL    Medications independent   has rx drug coverage with no issues in obtaining or affording copays    Meal Preparation assistive person    Housekeeping assistive person    Laundry assistive person    Shopping assistive person    Oral Care independent    Activity Tolerance    Current Activity Limitations none    Usual Activity Tolerance fair    Current Activity Tolerance poor    Cognitive/Perceptual/Developmental    Current Mental Status/Cognitive Functioning no deficits noted    Recent Changes in Mental Status/Cognitive Functioning no changes    Employment/Financial    Financial Concerns none   has medicare a/b and [employer] insurance with no recent changes to coverage            Psychosocial     None            Abuse/Neglect     None            Legal     None            Substance Abuse       06/06/17 1356    Substance Use, Patient    Substance Use current tobacco use    Tobacco Type chewing tobacco    Tobacco/Chew Amount --   1 pack per day    Readiness to Quit Tobacco Use not ready to quit    Tobacco Cessation Education (provide if tobacco used with i the last 12 mos) yes    Date Smoking Cessation Information Given 06/06/17            Patient Forms     None          Gabriela Mccarty RN

## 2017-06-07 NOTE — PROGRESS NOTES
Intensivist Note     6/7/2017  Hospital Day: 2      Mr. José Miguel Mackey Jr., 87 y.o. male is followed for:  Principal Problem:    Cardiac arrest with ventricular fibrillation  Active Problems:    Cardiomyopathy    Chronic kidney disease, stage IV (severe)    Atherosclerosis of coronary artery    CHF (congestive heart failure)    Right bundle branch block    Prolonged QT interval    Diabetes mellitus    Hyperlipidemia    Gastroesophageal reflux disease    Gout    Hypertension    Urinary tract infection    Mild dementia    Chronic midline low back pain without sciatica    Lactic acidosis. Resolved       SUBJECTIVE     Subjective    87-year-old white male with a history of coronary artery disease, ischemic cardiomyopathy with EF 20% and congestive heart failure (previously refused BIV PPM), right bundle branch block, prolonged QT interval, diabetes mellitus, chronic kidney disease stage IV, retention, hyperlipidemia, mild dementia, and gout. Noted chest pain 6/4/17 it would come and go. The evening of 6/5/17 presented because of the above chest discomfort and subjective fever and chills. He went into ventricular atrial fibrillation while in the ER and was cardioverted. He then had a 20 beat run of V. tach had a pulse and was begun on amiodarone therapy. Dr. Echeverria admitted the patient and we saw him after arrival in the ICU. He was placed on amiodarone drip, and all meds that could lengthen QT interval were avoided. He was empirically begun on antimicrobial therapy with Zosyn and doxycycline because his blood culture revealed some gram-positive cocci. This is most likely contaminant but his chest x-ray showed some increased markings (most likely congestive heart failure as BNP is greater than 2000) and white count was 18,000, so antibiotics will be continued for now. Final culture still not out this am         ( 6/7/17)     Urine output remains reduced by nursing I&O but they appear incomplete and creatinine is  "stable at 1.8. He denies SOB and denies any chest pain, nausea, vomiting, diaphoresis. Does mention he has had some abdominal discomfort at home but not at present. Denies edema and there is none on exam    The patient's relevant past medical, surgical and social history were reviewed and updated in Epic as appropriate.    OBJECTIVE     /78  Pulse 70  Temp 98.3 °F (36.8 °C) (Oral)   Resp 16  Ht 67\" (170.2 cm)  Wt 153 lb (69.4 kg)  SpO2 97%  BMI 23.96 kg/m2  Flow (L/min): 2    Flowsheet Rows         First Filed Value    Admission Height  67\" (170.2 cm) Documented at 06/05/2017 1944    Admission Weight  153 lb (69.4 kg) Documented at 06/05/2017 1944        Intake & Output (last day)       06/06 0701 - 06/07 0700 06/07 0701 - 06/08 0700    P.O. 120     I.V. (mL/kg) 199 (2.9)     IV Piggyback      Total Intake(mL/kg) 319 (4.6)     Urine (mL/kg/hr) 300 (0.2)     Stool 0 (0)     Total Output 300      Net +19            Unmeasured Urine Occurrence 6 x     Unmeasured Stool Occurrence 5 x           Objective    Exam:  General Exam:  Overweight white male sitting up in bed in NAD  HEENT: Pupils equal and reactive. Nose and throat clear.  Neck:                          Supple, no JVD, thyromegaly, or adenopathy  Lungs: Some rales in the posterior left lower lobe  Cardiovascular: Regular rate and rhythm without murmurs or gallops.  Abdomen: Soft nontender without organomegaly or masses.   and rectal: Deferred.  Extremities: No cyanosis, clubbing or edema the legs. Appears to have an infiltrated IV in his right arm  Neurologic:                 Symmetric strength but diffusely weak. No focal deficits.    Chest X-Ray: Cardiomegaly. No acute pulmonary infiltrates just some increased markings in the bases. Definitely clearer with less vascular markings than 6/5/17    Echocardiogram: EF only 15% with mild MR, moderate TR, mild AI. RVSP 49 mmHg    INFUSIONS    amiodarone 0.5 mg/min Last Rate: 0.5 mg/min (06/07/17 3306) "         Results from last 7 days  Lab Units 06/07/17  0325 06/06/17  0212 06/05/17  1956   WBC 10*3/mm3 11.41* 18.34* 13.46*   HEMOGLOBIN g/dL 12.3* 13.2 15.6   HEMATOCRIT % 38.7* 41.3 48.0   PLATELETS 10*3/mm3 135* 120* 157       Results from last 7 days  Lab Units 06/07/17  0325 06/06/17  0835 06/06/17  0212   SODIUM mmol/L 131*  --  134   POTASSIUM mmol/L 3.6 3.7 3.5   CHLORIDE mmol/L 101  --  104   TOTAL CO2 mmol/L 22.0  --  23.0   BUN mg/dL 25*  --  23   CREATININE mg/dL 1.80*  --  1.80*   GLUCOSE mg/dL 180*  --  162*   CALCIUM mg/dL 9.5  --  9.0       Results from last 7 days  Lab Units 06/06/17  0835 06/06/17  0212   MAGNESIUM mg/dL 2.2 1.7   PHOSPHORUS mg/dL  --  2.4       Lab Results   Component Value Date    SEDRATE <1 04/06/2017     Lab Results   Component Value Date    BNP 2636.0 (H) 06/05/2017     Lab Results   Component Value Date    TROPONINI 0.751 (H) 06/06/2017     Lab Results   Component Value Date    TSH 0.738 04/06/2017     Lab Results   Component Value Date    LACTATE 1.8 06/06/2017     No results found for: CORTISOL      Results from last 7 days  Lab Units 06/05/17  2155   PH, ARTERIAL pH units 7.376   PCO2, ARTERIAL mm Hg 27.3*   PO2 ART mm Hg 91.7   HCO3 ART mmol/L 16.0*   FIO2 % 100       I reviewed the patient's results, images and medication.    Assessment/Plan   ASSESSMENT      Principal Problem:    Cardiac arrest with ventricular fibrillation  Active Problems:    Cardiomyopathy    Chronic kidney disease, stage IV (severe)    Atherosclerosis of coronary artery    CHF (congestive heart failure)    Right bundle branch block    Prolonged QT interval    Diabetes mellitus    Hyperlipidemia    Gastroesophageal reflux disease    Gout    Hypertension    Urinary tract infection    Mild dementia    Chronic midline low back pain without sciatica    Lactic acidosis. Resolved      DISCUSSION: Appears as stable as he is going to be. Obviously his mortality is quite high and likely may have an acute  event at some time in the near future. He understands this and is noted in previous notes does not wish to undergo aggressive resuscitative measures.    PLAN     1. Continue present therapy  2. Cardiology will decide on transitioning amiodarone to oral  3. Transfer to the floor. We will ask hospitalists to assume care with cardiology    Plan of care and goals reviewed with mulitdisciplinary team at daily rounds.    I discussed the patient's findings and my recommendations with patient and nursing staff    Time spent Critical care 20 min (It does not include procedure time).    Asael Bess MD  Intensive Care Medicine  06/07/17 8:48 AM

## 2017-06-07 NOTE — PROGRESS NOTES
[facility] at [facility]  IP Progress Note      Chief Complaint/Reason for service:    · S/P cardiac Arrest  · Ischemic Cardiomyopathy  · Systolic Heart Failure         Patient lying in bed, appears comfortable.  Denies chest pain.  Family supportive at bedside.    Past medical, surgical, social and family history reviewed in the patient's electronic medical record.         Vital Sign Min/Max for last 24 hours  Temp  Min: 96.7 °F (35.9 °C)  Max: 100.1 °F (37.8 °C)   BP  Min: 116/76  Max: 146/73   Pulse  Min: 67  Max: 88   Resp  Min: 12  Max: 20   SpO2  Min: 91 %  Max: 99 %   Flow (L/min)  Min: 2  Max: 2      Intake/Output Summary (Last 24 hours) at 06/07/17 0952  Last data filed at 06/07/17 0800   Gross per 24 hour   Intake              799 ml   Output              450 ml   Net              349 ml           Physical Exam   Constitutional: He is oriented to person, place, and time. He appears well-developed and well-nourished.   HENT:   Head: Normocephalic.   Neck: No JVD present. Carotid bruit is not present.   Cardiovascular: Normal rate, regular rhythm and normal heart sounds.  Exam reveals no gallop and no friction rub.    No murmur heard.  Pulmonary/Chest: Effort normal and breath sounds normal.   Abdominal: He exhibits no distension.   Neurological: He is alert and oriented to person, place, and time.   Skin: Skin is warm and dry.       Results Review:   I reviewed the patient's recent labs in the electronic medical record.        Tele:  [facility]         Hospital Problem List     * (Principal)Cardiac arrest with ventricular fibrillation    Cardiomyopathy    Overview Signed 6/16/2016  7:18 PM by Jessica Duong MD     Impression: 12/02/2014 - sees Dr Monique- doing well  Impression: 04/07/2014 - sees  cardiology next month; Description: ef less than 20%,, elevated right heart pressures         Chronic kidney disease, stage IV (severe)    Overview Addendum 11/2/2016  9:06 AM by Elian  Heena     Impression: 08/31/2015 - update cmp  Impression: 12/02/2014 - update creatinine  Impression: 07/30/2014 - update cmp  Impression: 04/07/2014 - reviewed un and creatinine  have asked him to increase fluids  Impression: 03/17/2014 - update creat, increase diuretic due to edema;     1. CKD, stage 4:  a. Creatinine 2.0.   b. Creatinine 2.2 as of December 2014.           Atherosclerosis of native coronary artery of native heart    Overview Deleted 6/7/2017  9:51 AM by SEBASTIAN Malone            Diabetes mellitus    Overview Signed 6/16/2016  7:18 PM by Jessica Duong MD     Impression: 08/31/2015 - blood sugar is 169, hgn a1c 6.1%  is up to date with eye exam  no change neuropathy  ur alb neg  Impression: 04/07/2015 - blood sugar is 129, hgn a1c 6.1% (average 135)  is up to date with eye exam  no low sugars  foot care is good  decreased pulses   ckd with neg alb   bp is good  Impression: 12/02/2014 - blood sugar and 90 day average sugar are good   nephropathy stable  neuropathy multifactorial- continue to monitor   is up to date with eye exam  no hypoglycemia  follow  Impression: 07/30/2014 - blood sugar and average sugar are good  has nephropathy, continue to monitor  stopped ace due to low bp and orthostasis  discussed with patient  snack or juice if shaky- concerned about low sugar;          Gastroesophageal reflux disease    Gout    Hyperlipidemia    Overview Signed 6/16/2016  7:18 PM by Jessica Duong MD     Impression: 08/31/2015 - check flp  Impression: 04/07/2015 - reviewed last lipids with him along with targets for chol with h/o cad  Impression: 12/02/2014 - check flp  Impression: 07/30/2014 - check flp;          Hypertension    Overview Signed 6/16/2016  7:18 PM by Jessica Duong MD     Impression: 08/31/2015 - bp is good  Impression: 12/02/2014 - bp is good  Impression: 07/30/2014 - bp is good, orthostatic changes- discussed increase po intake and slow change in posture   Impression: 02/20/2014 - bp is good currently;          Urinary tract infection    Overview Signed 6/16/2016  7:18 PM by Jessica Duong MD     Impression: 07/30/2014 - check ua;          Acute on chronic systolic congestive heart failure    Overview Signed 11/2/2016  9:04 AM by Elian Naik     2. CHF:  a. On 03/24/2014, EF less than 20%, moderate MR, mild to moderate TR, RVSP 45-50.    b. Remote angioplasty and MI, greater than 20 years prior, data deficient, historical patient of Dr. Llamas.  c. BNP 5000, most recent 1100 as of April 2014 and 300 as of July 2014.             Mild dementia    Right bundle branch block    Overview Signed 11/2/2016  9:05 AM by Elian Naik     3. Abnormal EKG:  a. Right bundle branch block secondary to CT change, QRS of 490 milliseconds.             Chronic midline low back pain without sciatica    Lactic acidosis. Resolved    Prolonged QT interval                 · Consider transitioning amiodarone to po  · Consider starting aspirin 81 mg daily  · Ok to transfer to telemetry  · NO AV asnthosh blocking agents    SEBASTIAN Moreno  6/7/2017

## 2017-06-07 NOTE — PLAN OF CARE
Problem: Patient Care Overview (Adult)  Goal: Plan of Care Review  Outcome: Ongoing (interventions implemented as appropriate)    06/07/17 0542   Coping/Psychosocial Response Interventions   Plan Of Care Reviewed With patient;tawanda   Patient Care Overview   Progress improving   Outcome Evaluation   Outcome Summary/Follow up Plan Medication given for UTI symptoms. continue on amio         Problem: Pressure Ulcer (Adult)  Goal: Signs and Symptoms of Listed Potential Problems Will be Absent or Manageable (Pressure Ulcer)  Outcome: Ongoing (interventions implemented as appropriate)    06/07/17 0542   Pressure Ulcer   Problems Assessed (Pressure Ulcer) all   Problems Present (Pressure Ulcer) none         Problem: Arrhythmia/Dysrhythmia (Symptomatic) (Adult)  Goal: Signs and Symptoms of Listed Potential Problems Will be Absent or Manageable (Arrhythmia/Dysrhythmia)  Outcome: Ongoing (interventions implemented as appropriate)    06/07/17 0542   Arrhythmia/Dysrhythmia (Symptomatic)   Problems Assessed (Arrhythmia/Dysrhythmia) all   Problems Present (Arrhythmia/Dysrhythmia) none

## 2017-06-08 NOTE — PROGRESS NOTES
"[facility] at [facility] Progress Note     LOS: 3 days   Patient Care Team:  No Known Provider as PCP - General  Jessica Duong MD as Consulting Physician (Endocrinology)  PCP:  No Known Provider    Chief Complaint:  Fu vf arrest    SUBJECTIVE: Feels some better  Less dyspnea, no appetite      Review of Systems:   All systems have been reviewed and are negative with the exception of those mentioned above.      OBJECTIVE:    Vital Sign Min/Max for last 24 hours  Temp  Min: 97.7 °F (36.5 °C)  Max: 98.5 °F (36.9 °C)   BP  Min: 118/69  Max: 134/84   Pulse  Min: 65  Max: 78   Resp  Min: 16  Max: 16   SpO2  Min: 93 %  Max: 99 %   Flow (L/min)  Min: 2  Max: 2   No Data Recorded     Flowsheet Rows         First Filed Value    Admission Height  67\" (170.2 cm) Documented at 06/05/2017 1944    Admission Weight  153 lb (69.4 kg) Documented at 06/05/2017 1944          Telemetry:  sr      Intake/Output Summary (Last 24 hours) at 06/08/17 0827  Last data filed at 06/08/17 0300   Gross per 24 hour   Intake              220 ml   Output              450 ml   Net             -230 ml     Intake & Output (last 3 days)       06/05 0701 - 06/06 0700 06/06 0701 - 06/07 0700 06/07 0701 - 06/08 0700 06/08 0701 - 06/09 0700    P.O. 360 120 220     I.V. (mL/kg) 463 (6.7) 199 (2.9) 480 (6.9)     IV Piggyback 300       Total Intake(mL/kg) 1123 (16.2) 319 (4.6) 700 (10.1)     Urine (mL/kg/hr) 300 300 (0.2) 600 (0.4)     Stool  0 (0) 0 (0)     Total Output 300 300 600      Net +823 +19 +100              Unmeasured Urine Occurrence  6 x 4 x     Unmeasured Stool Occurrence  5 x 2 x            Physical Exam:  Physical Exam   Constitutional: He appears well-developed and well-nourished.   Neck: Normal range of motion. Neck supple. No hepatojugular reflux and no JVD present. Carotid bruit is not present. No tracheal deviation present. No thyromegaly present.   Cardiovascular: Normal rate, regular rhythm, S1 normal, S2 " normal, intact distal pulses and normal pulses.  PMI is not displaced.  Exam reveals no gallop, no distant heart sounds, no friction rub, no midsystolic click and no opening snap.    No murmur heard.  Pulses:       Radial pulses are 2+ on the right side, and 2+ on the left side.        Dorsalis pedis pulses are 2+ on the right side, and 2+ on the left side.        Posterior tibial pulses are 2+ on the right side, and 2+ on the left side.   Hypokinetic precordium   Pulmonary/Chest: Effort normal and breath sounds normal. He has no wheezes. He has no rales.   Abdominal: Soft. Bowel sounds are normal. He exhibits no mass. There is no tenderness. There is no guarding.        LABS/DIAGNOSTIC DATA:    Results from last 7 days  Lab Units 06/08/17  0422 06/07/17 0325 06/06/17  0212   WBC 10*3/mm3 8.27 11.41* 18.34*   HEMOGLOBIN g/dL 12.2* 12.3* 13.2   HEMATOCRIT % 37.3* 38.7* 41.3   PLATELETS 10*3/mm3 132* 135* 120*     No results found for: TROPONINT    Results from last 7 days  Lab Units 06/05/17  2033   INR  1.05       Results from last 7 days  Lab Units 06/08/17  0422 06/07/17  0325 06/06/17  0835 06/06/17  0212  06/05/17  1956   SODIUM mmol/L 132 131*  --  134  < > 140   POTASSIUM mmol/L 3.5 3.6 3.7 3.5  < > 3.8   CHLORIDE mmol/L 102 101  --  104  < > 102   TOTAL CO2 mmol/L 23.0 22.0  --  23.0  < > 24.0   BUN mg/dL 30* 25*  --  23  < > 19   CREATININE mg/dL 1.60* 1.80*  --  1.80*  < > 1.80*   CALCIUM mg/dL 9.7 9.5  --  9.0  < > 10.2   BILIRUBIN mg/dL  --   --   --   --   --  0.8   ALK PHOS U/L  --   --   --   --   --  67   ALT (SGPT) U/L  --   --   --   --   --  8   AST (SGOT) U/L  --   --   --   --   --  23   GLUCOSE mg/dL 143* 180*  --  162*  < > 137*   < > = values in this interval not displayed.                Results from last 7 days  Lab Units 06/05/17 1956   BNP pg/mL 2636.0*       Medication Review:     doxycycline 100 mg Oral BID With Meals   heparin (porcine) 5,000 Units Subcutaneous Q12H   insulin lispro  0-9 Units Subcutaneous 4x Daily With Meals & Nightly   oxybutynin XL 10 mg Oral Daily   pantoprazole 40 mg Oral Q AM   sertraline 50 mg Oral Daily   terazosin 1 mg Oral Nightly        amiodarone 0.5 mg/min Last Rate: 0.5 mg/min (06/08/17 0610)        Principal Problem:    Cardiac arrest with ventricular fibrillation  Active Problems:    Cardiomyopathy    Chronic kidney disease, stage IV (severe)    Atherosclerosis of native coronary artery of native heart    Diabetes mellitus    Gastroesophageal reflux disease    Gout    Hyperlipidemia    Hypertension    Urinary tract infection    Acute on chronic systolic congestive heart failure    Mild dementia    Right bundle branch block    Chronic midline low back pain without sciatica    Lactic acidosis. Resolved    Prolonged QT interval      ASSESSMENT/PLAN:  Dismal EF on echo 15%    Pt/family philosophical re prognosis- doesn't want prolonged death.  AND status  ICU today to tele am if no events  No AV santhosh blocking other than amio w trifascicular lock    Leukocytosis improved -on broad abx , ask ID to disposition w strep in bld cxs    Home in am w abx arranged          Suhas Monique MD   06/08/17  8:27 AM

## 2017-06-08 NOTE — PLAN OF CARE
Problem: Patient Care Overview (Adult)  Goal: Plan of Care Review  Outcome: Ongoing (interventions implemented as appropriate)  Possible DC tomorrow

## 2017-06-08 NOTE — PROGRESS NOTES
[facility] Medicine Services  INPATIENT PROGRESS NOTE    Date of Admission: 6/5/2017  Length of Stay: 3  Primary Care Physician: No Known Provider    Subjective   CC:  Dyspnea  HPI:    Just weak and unsteady. Denies CP/dyspnea/palpitations. Does note dry cough. Has urinary frequency/incomplete emptying. No f/c. No n/v. Just tired.    Review Of Systems:   Review of Systems   Constitutional: Positive for activity change and fatigue.   HENT: Negative.    Respiratory: Positive for cough.    Cardiovascular: Negative.  Negative for chest pain and palpitations.   Gastrointestinal: Negative.    Genitourinary: Positive for difficulty urinating and frequency.   Musculoskeletal: Negative.    Neurological: Negative.    Hematological: Negative.    Psychiatric/Behavioral: Negative.          Objective      Temp:  [97.7 °F (36.5 °C)-97.8 °F (36.6 °C)] 97.8 °F (36.6 °C)  Heart Rate:  [65-78] 78  Resp:  [16] 16  BP: (118-134)/(69-84) 131/79  Physical Exam   Constitutional: He is oriented to person, place, and time. He appears well-developed.   HENT:   Head: Normocephalic.   Mouth/Throat: Oropharynx is clear and moist.   Eyes: Conjunctivae are normal. Pupils are equal, round, and reactive to light.   Neck: Normal range of motion. No thyromegaly present.   Cardiovascular: Normal rate, regular rhythm and normal heart sounds.    Pulmonary/Chest: Effort normal and breath sounds normal. No respiratory distress.   Abdominal: Soft. Bowel sounds are normal. He exhibits no distension. There is no tenderness.   Musculoskeletal: Normal range of motion.   Lymphadenopathy:     He has no cervical adenopathy.   Neurological: He is alert and oriented to person, place, and time.   Skin: Skin is warm. No rash noted.   Vitals reviewed.        Results Review:    I have reviewed the labs, radiology results and diagnostic studies.      Results from last 7 days  Lab Units 06/08/17  0422   WBC 10*3/mm3 8.27   HEMOGLOBIN g/dL 12.2*    PLATELETS 10*3/mm3 132*       Results from last 7 days  Lab Units 06/08/17  0422   SODIUM mmol/L 132   POTASSIUM mmol/L 3.5   CHLORIDE mmol/L 102   TOTAL CO2 mmol/L 23.0   BUN mg/dL 30*   CREATININE mg/dL 1.60*   GLUCOSE mg/dL 143*   CALCIUM mg/dL 9.7       Culture Data: Cultures:    Blood Culture   Date Value Ref Range Status   06/05/2017 Streptococcus, Beta Hemolytic, Group C (A)  Preliminary   06/05/2017 Streptococcus, Beta Hemolytic, Group C (A)  Preliminary     Comment:     SEE CULTURE [id number] FOR SUSCEPTIBILITIES     Urine Culture   Date Value Ref Range Status   06/05/2017 30,000-40,000 CFU/mL Enterococcus species (A)  Preliminary       Radiology Data:     I have reviewed the medications.    Assessment/Plan     Problem List  Hospital Problem List     * (Principal)Cardiac arrest with ventricular fibrillation    Cardiomyopathy    Overview Signed 6/16/2016  7:18 PM by Jessica Duong MD     Impression: 12/02/2014 - sees Dr Monique- doing well  Impression: 04/07/2014 - sees  cardiology next month; Description: ef less than 20%,, elevated right heart pressures         Chronic kidney disease, stage IV (severe)    Overview Addendum 11/2/2016  9:06 AM by Elian Naik     Impression: 08/31/2015 - update cmp  Impression: 12/02/2014 - update creatinine  Impression: 07/30/2014 - update cmp  Impression: 04/07/2014 - reviewed un and creatinine  have asked him to increase fluids  Impression: 03/17/2014 - update creat, increase diuretic due to edema;     1. CKD, stage 4:  a. Creatinine 2.0.   b. Creatinine 2.2 as of December 2014.           Atherosclerosis of native coronary artery of native heart    Overview Deleted 6/7/2017  9:51 AM by SEBASTIAN Malone            Diabetes mellitus    Overview Signed 6/16/2016  7:18 PM by Jessica Duong MD     Impression: 08/31/2015 - blood sugar is 169, hgn a1c 6.1%  is up to date with eye exam  no change neuropathy  ur alb neg  Impression: 04/07/2015 - blood sugar  is 129, hgn a1c 6.1% (average 135)  is up to date with eye exam  no low sugars  foot care is good  decreased pulses   ckd with neg alb   bp is good  Impression: 12/02/2014 - blood sugar and 90 day average sugar are good   nephropathy stable  neuropathy multifactorial- continue to monitor   is up to date with eye exam  no hypoglycemia  follow  Impression: 07/30/2014 - blood sugar and average sugar are good  has nephropathy, continue to monitor  stopped ace due to low bp and orthostasis  discussed with patient  snack or juice if shaky- concerned about low sugar;          Gastroesophageal reflux disease    Gout    Hyperlipidemia    Overview Signed 6/16/2016  7:18 PM by Jessica Duong MD     Impression: 08/31/2015 - check flp  Impression: 04/07/2015 - reviewed last lipids with him along with targets for chol with h/o cad  Impression: 12/02/2014 - check flp  Impression: 07/30/2014 - check flp;          Hypertension    Overview Signed 6/16/2016  7:18 PM by Jessica Duong MD     Impression: 08/31/2015 - bp is good  Impression: 12/02/2014 - bp is good  Impression: 07/30/2014 - bp is good, orthostatic changes- discussed increase po intake and slow change in posture  Impression: 02/20/2014 - bp is good currently;          Urinary tract infection    Overview Signed 6/16/2016  7:18 PM by Jessica Duong MD     Impression: 07/30/2014 - check ua;          Acute on chronic systolic congestive heart failure    Overview Signed 11/2/2016  9:04 AM by Elian Naik     2. CHF:  a. On 03/24/2014, EF less than 20%, moderate MR, mild to moderate TR, RVSP 45-50.    b. Remote angioplasty and MI, greater than 20 years prior, data deficient, historical patient of Dr. Llamas.  c. BNP 5000, most recent 1100 as of April 2014 and 300 as of July 2014.             Mild dementia    Right bundle branch block    Overview Signed 11/2/2016  9:05 AM by Elian Naik     3. Abnormal EKG:  a. Right bundle branch block secondary to CT  change, QRS of 490 milliseconds.             Chronic midline low back pain without sciatica    Lactic acidosis. Resolved    Prolonged QT interval          Positive blood cultures  --3/4 bottles, but group C strep  --consult [facility] for any further recommendations  Vfib  --amiodarone per cardiology  DM  --decent control here  SHF  --appears compensated  Urinary retention/probable BPH  --ditropan  CKD 4  Hx of CABG      DVT prophylaxis:  SC Heparin  Discharge Planning: I expect patient to be discharged home tomorrow.    Kranthi Serna MD   06/08/17   7:36 AM

## 2017-06-08 NOTE — PLAN OF CARE
Problem: Patient Care Overview (Adult)  Goal: Plan of Care Review  Outcome: Ongoing (interventions implemented as appropriate)  Monitor BP possible dc tomorrow

## 2017-06-08 NOTE — PLAN OF CARE
Problem: Patient Care Overview (Adult)  Goal: Plan of Care Review  Outcome: Ongoing (interventions implemented as appropriate)    06/08/17 0414   Coping/Psychosocial Response Interventions   Plan Of Care Reviewed With patient;daughter   Patient Care Overview   Progress progress toward functional goals is gradual       Goal: Adult Individualization and Mutuality  Outcome: Ongoing (interventions implemented as appropriate)  Goal: Discharge Needs Assessment  Outcome: Ongoing (interventions implemented as appropriate)    Problem: Pressure Ulcer (Adult)  Goal: Signs and Symptoms of Listed Potential Problems Will be Absent or Manageable (Pressure Ulcer)  Outcome: Ongoing (interventions implemented as appropriate)    06/07/17 0542   Pressure Ulcer   Problems Assessed (Pressure Ulcer) all   Problems Present (Pressure Ulcer) none         Problem: Arrhythmia/Dysrhythmia (Symptomatic) (Adult)  Goal: Signs and Symptoms of Listed Potential Problems Will be Absent or Manageable (Arrhythmia/Dysrhythmia)  Outcome: Ongoing (interventions implemented as appropriate)    06/08/17 0414   Arrhythmia/Dysrhythmia (Symptomatic)   Problems Assessed (Arrhythmia/Dysrhythmia) all   Problems Present (Arrhythmia/Dysrhythmia) none

## 2017-06-08 NOTE — PROGRESS NOTES
"Adult Nutrition  Assessment/PES    Patient Name:  José Miguel Mackey Jr.  YOB: 1929  MRN: [id number]  Admit Date:  6/5/2017    Assessment Date:  6/8/2017        Reason for Assessment       06/08/17 1144    Reason for Assessment    Reason For Assessment/Visit follow up protocol    Time Spent (min) 20    Diagnosis Diagnosis   per notes this admission                Nutrition/Diet History       06/08/17 1145    Nutrition/Diet History    Reported/Observed By Patient;Family    Food Habit/Preferences Uses Supplements    Other Patient and family in room at time of visit. Family report patients appetite has not been good. Patient reports \"nothing sounds good like it used to.\" Family states patient has been drinking boost supplement and likes strawberry flavor best.             Anthropometrics       06/08/17 1213    Anthropometrics (Special Considerations)    Height Used for Calculations 1.702 m (5' 7\")    Weight Used for Calculations 69.4 kg (153 lb)            Labs/Tests/Procedures/Meds       06/08/17 1213    Labs/Tests/Procedures/Meds    Labs/Tests Review Reviewed                Nutrition Prescription Ordered       06/08/17 1213    Nutrition Prescription PO    Current PO Diet Regular    Supplement Boost Plus     Supplement Frequency 3 times a day    Common Modifiers Cardiac;Consistent Carbohydrate            Evaluation of Received Nutrient/Fluid Intake       06/08/17 1214    PO Evaluation    Number of Meals 3    % PO Intake 58%   patient also drinking boost plus supplements            Problem/Interventions:        Problem 2       06/08/17 1215    Nutrition Diagnoses Problem 2    Problem 2 Inadequate Intake/Infusion    Inadequate Intake Type Oral    Etiology (related to) Medical Diagnosis   clinical condition/poor appetite    Signs/Symptoms (evidenced by) Report of Minimal PO Intake   per patient and patients family                Intervention Goal       06/08/17 1215    Intervention Goal    General " Nutrition support treatment    PO Increase intake            Nutrition Intervention       06/08/17 1215    Nutrition Intervention    RD/Tech Action Encourage intake;Care plan reviewd;Follow Tx progress;Advise alternate selection;Adjusted supplement   encouraged full use of supplements (strawberry flavor)            Nutrition Prescription       06/08/17 1216    Nutrition Prescription PO    PO Prescription Begin/change supplement    Supplement Boost Plus   strawberry    Supplement Frequency 3 times a day    New PO Prescription Ordered? Yes            Education/Evaluation       06/08/17 1215    Monitor/Evaluation    Monitor Per protocol;PO intake;Supplement intake        Electronically signed by:  Betty Zamarripa  06/08/17 12:16 PM

## 2017-06-08 NOTE — CONSULTS
José Miguel Mackey .  12/19/1929  [id number]    Date of Consult: 6/8/2017 6/5/2017      Requesting Provider: No ref. provider found  Evaluating Physician: Elian Valdez MD    Chief Complaint: Generalized weakness    Reason for Consultation: Bacteremia and UTI    History of present illness:    Patient is a 87 y.o.  Yr old male with history of advanced cardiomyopathy/prolonged QT with advanced chronic kidney disease in addition to type 2 diabetes and other numerous comorbidity.  He is admitted on June 5, 2017 with subjective fever/chills, nausea/vomiting and dyspnea, had V. fib in the emergency room and converted.  He was started on amiodarone.  He has been found to have strep bacteremia, enterococcal UTI with urinary frequency.      He is not a good historian in general.  He reports vague right arm redness/pain but cannot recall exactly when it started.  Family thinks it may have been from an IV but not completely sure wasn't there prior to admission.  It is improving with antibiotics.  This is at the forearm.  He describes pain which is dull, constant, nonradiating, worse with palpation, better with antibiotics and supportive measures.  He has urinary frequency but denies dysuria hematuria or pyuria.  No hesitancy urgency or flank pain and he does not want urology input.  No headache photophobia or neck stiffness.  He had low-grade cough, now better and shortness of breath improving since admission.  No hemoptysis.  No diarrhea.  No abdominal pain.  Denies any other musculoskeletal pain.    No raw or undercooked food.  No unpasteurized milk or milk products.  No animal insect or arthropod exposure.  No tick bites.  No outdoor camping or hunting exposure.  No travel exposure.  No ill exposure.  No history of TB or TB exposure.   Denies a history of MRSA/VRE and no history of C. difficile or ESBL/KPC  organisms.    Past Medical History:   Diagnosis Date   • CHF (congestive heart failure)    • CKD stage 4 due  to type 2 diabetes mellitus    • Diabetes mellitus    • Dyslipidemia    • Gout    • History of acute myocardial infarction    • Hypertension    • Mild dementia    • Pelvic fracture     Remote pelvic fracture with chronic hip pain.    • Right bundle branch block        Past Surgical History:   Procedure Laterality Date   • ABDOMINAL SURGERY     • ANKLE SURGERY      Remote ankle surgery.     • CARDIAC SURGERY     • KIDNEY SURGERY         Pediatric History   Patient Guardian Status   • Not on file.     Other Topics Concern   • Not on file     Social History Narrative   He denies tobacco alcohol or illicit drugs.  He lives in [place]    family history includes Heart attack in his brother and other; Heart disease in his sister.    Allergies   Allergen Reactions   • Beta Adrenergic Blockers    • Dipyridamole    • Dobutamine        Medication:  Current Facility-Administered Medications   Medication Dose Route Frequency Provider Last Rate Last Dose   • amiodarone (PACERONE) tablet 200 mg  200 mg Oral Q24H Suhas Monique MD       • DAPTOmycin (CUBICIN) 300 mg in sodium chloride 0.9 % 50 mL IVPB  300 mg Intravenous Once Elian Valdez MD       • dextrose (D50W) solution 25 g  25 g Intravenous Q15 Min PRN SEBASTIAN Minaya       • dextrose (GLUTOSE) oral gel 15 g  15 g Oral Q15 Min PRN SEBASTIAN Minaya       • doxycycline (VIBRAMYCIN) capsule 100 mg  100 mg Oral BID With Meals Asael Bess MD   100 mg at 06/08/17 0814   • glucagon (GLUCAGEN) injection 1 mg  1 mg Subcutaneous Q15 Min PRN SEBASTIAN Minaya       • heparin (porcine) 5000 UNIT/ML injection 5,000 Units  5,000 Units Subcutaneous Q12H Estee Echeverria MD   5,000 Units at 06/08/17 0814   • insulin lispro (humaLOG) injection 0-9 Units  0-9 Units Subcutaneous 4x Daily With Meals & Nightly Asael Bess MD   2 Units at 06/08/17 0819   • metoclopramide (REGLAN) injection 5 mg  5 mg Intravenous Q8H PRN SEBASTIAN Minaya        • oxybutynin XL (DITROPAN-XL) 24 hr tablet 10 mg  10 mg Oral Daily Kranthi Serna MD   10 mg at 06/08/17 0814   • pantoprazole (PROTONIX) EC tablet 40 mg  40 mg Oral Q AM Asael Bess MD   40 mg at 06/08/17 0517   • piperacillin-tazobactam (ZOSYN) 2.25 g/100 mL 0.9% NS IVPB (mbp)  2.25 g Intravenous Q6H Elian Valdez MD       • sertraline (ZOLOFT) tablet 50 mg  50 mg Oral Daily Asael Bess MD   50 mg at 06/08/17 0814   • sodium chloride 0.9 % flush 1-10 mL  1-10 mL Intravenous PRN SEBASTIAN Minaya       • terazosin (HYTRIN) capsule 1 mg  1 mg Oral Nightly SEBASTIAN Minaya   1 mg at 06/07/17 2051       Antibiotics:  IV Anti-Infectives     Ordered     Dose/Rate Route Frequency Start Stop    06/08/17 0853  piperacillin-tazobactam (ZOSYN) 2.25 g/100 mL 0.9% NS IVPB (mbp)     Ordering Provider:  Elian Valdez MD    2.25 g Intravenous Every 6 Hours 06/08/17 1000      06/08/17 0856  DAPTOmycin (CUBICIN) 300 mg in sodium chloride 0.9 % 50 mL IVPB     Comments:  Dosed at 3 mg/kg (rounded)   Ordering Provider:  Elian Valdez MD    300 mg  100 mL/hr over 30 Minutes Intravenous Once 06/08/17 0930      06/07/17 0953  doxycycline (VIBRAMYCIN) capsule 100 mg     Ordering Provider:  Asael Bess MD    100 mg Oral 2 Times Daily With Meals 06/07/17 1800 06/12/17 1759    06/05/17 2208  piperacillin-tazobactam (ZOSYN) 4.5 g/100 mL 0.9% NS IVPB (mbp)     Ordering Provider:  Jv Zuleta MD    4.5 g Intravenous Once 06/05/17 2210 06/05/17 234            Review of Systems    Constitutional-- As above, generally weak and fatigued with diminished appetite  Heent-- No new vision, hearing or throat complaints.  No epistaxis or oral sores.  Denies odynophagia or dysphagia.  No flashers, floaters or eye pain. No odynophagia or dysphagia. No headache, photophobia or neck stiffness.  CV-- No chest pain at present.  Resp-- as above  GI- No nausea, vomiting, or diarrhea.  No hematochezia, melena, or  "hematemesis. Denies jaundice or chronic liver disease.  -- No dysuria, hematuria, or flank pain.  Denies hesitancy or flank pain.  Lymph- no swollen lymph nodes in neck/axilla or groin.   Heme- No active bruising or bleeding; no Hx of DVT or PE.  MS-- no swelling or pain in the bones or joints of arms/legs.  No new back pain.  Neuro-- No acute focal weakness or numbness in the arms or legs.  No seizures.    Full 12 point review of systems reviewed and negative otherwise for acute complaints, except for above    Physical Exam:   Vital Signs   /74 (BP Location: Left arm, Patient Position: Lying)  Pulse 69  Temp 98.5 °F (36.9 °C) (Oral)   Resp 16  Ht 67\" (170.2 cm)  Wt 153 lb (69.4 kg)  SpO2 94%  BMI 23.96 kg/m2    GENERAL: Awake chronically ill and in no acute distress  HEENT: Normocephalic, atraumatic.  PERRL. EOMI. No conjunctival injection. No icterus. Oropharynx clear without evidence of thrush or exudate. No teeth  NECK: Supple without nuchal rigidity. No mass.  LYMPH: No cervical, axillary or inguinal lymphadenopathy.  HEART: RRR; No murmur, rubs, gallops.   LUNGS: Managed at the bases with scattered rhonchi. Normal respiratory effort. Nonlabored. No dullness.  ABDOMEN: Soft, nontender, nondistended. Positive bowel sounds. No rebound or guarding. NO mass or HSM.  EXT:  No cyanosis, clubbing or edema. No cord.  : Genitalia generally unremarkable.  Without Puentes catheter.  MSK: FROM without joint effusions noted arms/legs.    SKIN: Warm and dry without cutaneous eruptions on Inspection/palpation aside from below.    NEURO: Oriented to PPT. No focal deficits on motor/sensory exam at arms/legs.    Right forearm vaguely red/warm with no palpable cord.  No open wounds or active drainage.  Slight tenderness but no discrete mass bulge or fluctuance.  No crepitus or bulla.      No peripheral stigmata/phenomena of endocarditis    Left upper buttock with small area of erythema induration warmth and " tenderness but faded in intensity and no discrete mass bulge or fluctuance.  No crepitus or bulla and no open wound or drainage    Laboratory Data      Results from last 7 days  Lab Units 06/08/17  0422 06/07/17  0325 06/06/17  0212   WBC 10*3/mm3 8.27 11.41* 18.34*   HEMOGLOBIN g/dL 12.2* 12.3* 13.2   HEMATOCRIT % 37.3* 38.7* 41.3   PLATELETS 10*3/mm3 132* 135* 120*       Results from last 7 days  Lab Units 06/08/17  0422   SODIUM mmol/L 132   POTASSIUM mmol/L 3.5   CHLORIDE mmol/L 102   TOTAL CO2 mmol/L 23.0   BUN mg/dL 30*   CREATININE mg/dL 1.60*   GLUCOSE mg/dL 143*   CALCIUM mg/dL 9.7       Results from last 7 days  Lab Units 06/05/17 1956   ALK PHOS U/L 67   BILIRUBIN mg/dL 0.8   ALT (SGPT) U/L 8   AST (SGOT) U/L 23           Results from last 7 days  Lab Units 06/05/17 1956   CRP mg/dL 12.50*       Estimated Creatinine Clearance: 30.4 mL/min (by C-G formula based on Cr of 1.6).      Microbiology:      Radiology:  Imaging Results (last 72 hours)     Procedure Component Value Units Date/Time    XR Chest 1 View [[id number]]  (Abnormal) Collected:  06/05/17 1942     Updated:  06/05/17 2035    Narrative:         EXAM:    XR Chest, 1 View    CLINICAL HISTORY:    87 years old, male; Pain; Other: Upper abdomen; Additional info: Upper   abdominal pain triage protocol    TECHNIQUE:    Frontal view of the chest.    COMPARISON:    No relevant prior studies available.    FINDINGS:    Lungs:  Patchy perihilar pulmonary opacities and few air bronchograms,   pulmonary edema versus early infectious infiltrate.    Pleural space:  Unremarkable.  No pneumothorax.    Heart:  Moderate cardiomediastinal enlargement, correlate for cardiomegaly   versus pericardial effusion.    Mediastinum:  Unremarkable.    Bones/joints:  Degenerative changes of the lower cervical spine.  Old healed   right 3rd-8th rib fractures.  No acute fracture.      Impression:       1.  Patchy perihilar pulmonary opacities and few air bronchograms, pulmonary    edema versus early infectious infiltrate.    2.  Moderate cardiomediastinal enlargement, correlate for cardiomegaly versus   pericardial effusion.    THIS DOCUMENT HAS BEEN ELECTRONICALLY SIGNED BY JAISON PRICE MD    XR Chest 1 View [[id number]] Collected:  06/07/17 0934     Updated:  06/07/17 1020    Narrative:       EXAMINATION: XR CHEST 1 VIEW-06/07/2017:      INDICATION: Congestive heart failure. Questionable pneumonia;  I46.9-Cardiac arrest, cause unspecified; I49.01-Ventricular  fibrillation; N39.0-Urinary tract infection, site not specified;  I50.22-Chronic systolic (congestive) heart failure; A41.9-Sepsis,  unspecified organism; Z66-Do not resuscitate.      COMPARISON: 06/05/2017.     FINDINGS: The heart is large but compensated. There is no acute  inflammatory process. There is no mass or effusion.           Impression:       Cardiomegaly, compensated. There are no acute pulmonary  findings.     D:  06/07/2017  E:  06/07/2017     This report was finalized on 6/7/2017 10:18 AM by Dr. Asa Connelly MD.               Impression:   --Acute strep bacteremia.  In 2 sets and I suspect a pathogen.  Concerns with respect to source include skin/soft tissue and respiratory tract.  Vague chest x-ray changes with possible pneumonia.  He does have several areas of soft tissue inflammation including left upper buttock and right arm.  No other obvious musculoskeletal focus.  Seems to be improving with empiric antibiotics with no stigmata of endocarditis.  Transthoracic echocardiogram no vegetation, discussed directly with Dr. Monique.  Follow-up cultures pending.    --Acute complicated UTI with urinary frequency and enterococcus and culture.  Follow up on final sensitivity data.  He likely has some functional/anatomic disturbance and you would need to consider referral to urology if he becomes agreeable as an outpatient.  Any option/timing or threshold for further workup of functional/anatomic disturbance would be left to  urology and if found and treated, may help minimize risk for relapse.  He will consider his options.    --Acute right forearm cellulitis.  Improving with supportive measures and antibiotics.  Monitor.  No evidence for abscess or necrotic focus.  Unclear based on patient's history whether this was present at admission or a result of peripheral IV after admission.    --Acute left upper buttock cellulitis.  Unclear if this was related to pressure or some other cutaneous trauma.  It is improving with no obvious abscess or necrotic focus.  Continue supportive measures and antibiotics.    --Chronic kidney disease, stage IV.  Monitor to help guide further adjustments and antimicrobials    --Dyspnea.  Cough improved and at least CHF but also possibly some component of respiratory tract infection.  Empiric antibiotics ongoing.  Cough not productive and no hemoptysis.  If he does not steadily improve consideration could be given to repeat chest imaging    --Cardiac arrest with ventricular fibrillation and prolonged QT interval.    --Severe/advanced cardiomyopathy with CHF per cardiology    PLAN: Thank you for asking us to see José Miguel Mackey Jr., I recommend the following:  --IV Zosyn/doxycycline.  Daptomycin times one    --I discussed potential risks and benefits of the prescribed antibiotics that include, but are not limited to, solid organ toxicity, neuro/muscle toxicity, renal toxicity, CDiff, cytopenias, hypersensitivity, Pulm toxicity,etc.. Patient/Family voice understanding and agree to proceed.     --Discussed with Dr. Monique    --Partial history per nursing staff, discussed with microbiology and discussed with family    --Highly complex set of issues with high risk for further serious morbidity and other serious sequela with generally guarded long-term prognosis    Elian Valdez MD  6/8/2017

## 2017-06-09 NOTE — PROGRESS NOTES
Continued Stay Note  [facility]     Patient Name: José Miguel Mackey Jr.  MRN: [id number]  Today's Date: 6/9/2017    Admit Date: 6/5/2017          Discharge Plan       06/09/17 0940    Case Management/Social Work Plan    Additional Comments CM has spoken with pts wife who reports she is now interested in possible rehab at discharge. PT will have to do an evaluation for this to be considered. CM to follow.              Discharge Codes     None        Expected Discharge Date and Time     Expected Discharge Date Expected Discharge Time    Aris 10, 2017             Pushpa Mix RN

## 2017-06-09 NOTE — PROGRESS NOTES
[facility] Progress Note    6/5/2017      Antibiotics:  IV Anti-Infectives     Ordered     Dose/Rate Route Frequency Start Stop    06/08/17 0853  piperacillin-tazobactam (ZOSYN) 2.25 g/100 mL 0.9% NS IVPB (mbp)     Ordering Provider:  Elian Valdez MD    2.25 g Intravenous Every 6 Hours 06/08/17 1000      06/08/17 0856  DAPTOmycin (CUBICIN) 300 mg in sodium chloride 0.9 % 50 mL IVPB     Comments:  Dosed at 3 mg/kg (rounded)   Ordering Provider:  Elian Valdez MD    300 mg  100 mL/hr over 30 Minutes Intravenous Once 06/08/17 0930 06/08/17 1458    06/07/17 0953  doxycycline (VIBRAMYCIN) capsule 100 mg     Ordering Provider:  Asael Bess MD    100 mg Oral 2 Times Daily With Meals 06/07/17 1800 06/12/17 1759    06/05/17 2208  piperacillin-tazobactam (ZOSYN) 4.5 g/100 mL 0.9% NS IVPB (mbp)     Ordering Provider:  Jv Zuleta MD    4.5 g Intravenous Once 06/05/17 2210 06/05/17 2347          CC:Dyspnea    HPI:  Patient is a 87 y.o. Yr old male with history of advanced cardiomyopathy/prolonged QT with advanced chronic kidney disease in addition to type 2 diabetes and other numerous comorbidity. He is admitted on June 5, 2017 with subjective fever/chills, nausea/vomiting and dyspnea, had V. fib in the emergency room and converted. He was started on amiodarone. He has been found to have strep bacteremia, enterococcal UTI with urinary frequency.        He is not a good historian in general.     6/9/17 daughter present this morning is concerned about his general debility and fluctuating confusion associated with poor sleep and has baseline dementia ; He reports vague right arm redness/pain but cannot recall exactly when it started. Family thinks it may have been from an IV but not completely sure wasn't there prior to admission. It is improving with antibiotics. This is at the forearm. He describes pain which is dull, constant, nonradiating, worse with palpation, better with antibiotics and supportive measures. He  "has urinary frequency but denies dysuria hematuria or pyuria. No hesitancy urgency or flank pain and he does not want urology input. No headache photophobia or neck stiffness. He had low-grade cough, now better and shortness of breath improving since admission. No hemoptysis. No diarrhea. No abdominal pain. Denies any other musculoskeletal pain.    ROS:      6/9/17 No f/c/s. No n/v/d. No rash. No new ADR to Abx.     Constitutional-- generally fatigued with poor appetite  Heent-- No new vision, hearing or throat complaints.  No epistaxis or oral sores.  Denies odynophagia or dysphagia.  No flashers, floaters or eye pain. No odynophagia or dysphagia. No headache, photophobia or neck stiffness.  CV-- No chest pain, palpitation or syncope  Resp-- as above  GI- No nausea, vomiting, or diarrhea.  No hematochezia, melena, or hematemesis. Denies jaundice or chronic liver disease.  -- No dysuria, hematuria, or flank pain.  Denies hesitancy, urgency or flank pain.  Lymph- no swollen lymph nodes in neck/axilla or groin.   Heme- No active bruising or bleeding; no Hx of DVT or PE.  MS-- no swelling or pain in the bones or joints of arms/legs.  No new back pain.  Neuro-- No acute focal weakness or numbness in the arms or legs.  No seizures. Baseline dementia and generally confused    Full 12 point review of systems reviewed and negative otherwise for acute complaints, other than above      PE:   /72 (BP Location: Left arm, Patient Position: Sitting)  Pulse 80  Temp 97.8 °F (36.6 °C) (Oral)   Resp 16  Ht 67\" (170.2 cm)  Wt 153 lb (69.4 kg)  SpO2 92%  BMI 23.96 kg/m2    GENERAL: Awake with dementia, in no acute distress.   HEENT: Normocephalic, atraumatic.  PERRL. EOMI. No conjunctival injection. No icterus. Oropharynx clear without evidence of thrush or exudate. No evidence of peridontal disease.    NECK: Supple without nuchal rigidity. No mass.  LYMPH: No cervical, axillary or inguinal lymphadenopathy.  HEART: RRR; " No murmur, rubs, gallops.   LUNGS: Clear to auscultation bilaterally without wheezing, rales, rhonchi. Normal respiratory effort. Nonlabored. No dullness.  ABDOMEN: Soft, nontender, nondistended. Positive bowel sounds. No rebound or guarding. NO mass or HSM.  EXT:  No cyanosis, clubbing or edema. No cord.  : Genitalia generally unremarkable.  Without Puentes catheter.  MSK: FROM without joint effusions noted arms/legs.    SKIN: Warm and dry without cutaneous eruptions on Inspection/palpation.    NEURO: Moves all 4's but generally weak    Right forearm vaguely red/warm with no palpable cord. No open wounds or active drainage. Slight tenderness but no discrete mass bulge or fluctuance. No crepitus or bulla.      No peripheral stigmata/phenomena of endocarditis     Left upper buttock with small area of erythema induration warmth and tenderness but faded in intensity and no discrete mass bulge or fluctuance. No crepitus or bulla and no open wound or drainage        Laboratory Data      Results from last 7 days  Lab Units 06/09/17  0458 06/08/17  0422 06/07/17  0325   WBC 10*3/mm3 7.96 8.27 11.41*   HEMOGLOBIN g/dL 12.2* 12.2* 12.3*   HEMATOCRIT % 37.9* 37.3* 38.7*   PLATELETS 10*3/mm3 162 132* 135*       Results from last 7 days  Lab Units 06/09/17  0458   SODIUM mmol/L 138   POTASSIUM mmol/L 3.3*   CHLORIDE mmol/L 105   TOTAL CO2 mmol/L 23.0   BUN mg/dL 26*   CREATININE mg/dL 1.60*   GLUCOSE mg/dL 157*   CALCIUM mg/dL 9.9       Results from last 7 days  Lab Units 06/09/17  0458   ALK PHOS U/L 56   BILIRUBIN mg/dL 0.7   ALT (SGPT) U/L 22   AST (SGOT) U/L 22           Results from last 7 days  Lab Units 06/05/17  1956   CRP mg/dL 12.50*       Estimated Creatinine Clearance: 30.4 mL/min (by C-G formula based on Cr of 1.6).      Microbiology:      Radiology:  Imaging Results (last 72 hours)     Procedure Component Value Units Date/Time    XR Chest 1 View [[id number]] Collected:  06/07/17 0934     Updated:  06/07/17 1020     Narrative:       EXAMINATION: XR CHEST 1 VIEW-06/07/2017:      INDICATION: Congestive heart failure. Questionable pneumonia;  I46.9-Cardiac arrest, cause unspecified; I49.01-Ventricular  fibrillation; N39.0-Urinary tract infection, site not specified;  I50.22-Chronic systolic (congestive) heart failure; A41.9-Sepsis,  unspecified organism; Z66-Do not resuscitate.      COMPARISON: 06/05/2017.     FINDINGS: The heart is large but compensated. There is no acute  inflammatory process. There is no mass or effusion.           Impression:       Cardiomegaly, compensated. There are no acute pulmonary  findings.     D:  06/07/2017  E:  06/07/2017     This report was finalized on 6/7/2017 10:18 AM by Dr. Asa Connelly MD.               Impression:   --Acute strep bacteremia. In 2 sets and I suspect a pathogen. Concerns with respect to source include skin/soft tissue and respiratory tract. Vague chest x-ray changes with possible pneumonia. He does have several areas of soft tissue inflammation including left upper buttock and right arm. No other obvious musculoskeletal focus. Seems to be improving with empiric antibiotics with no stigmata of endocarditis. Transthoracic echocardiogram no vegetation, discussed directly with Dr. Monique.  Follow-up cultures pending.     --Acute complicated UTI with urinary frequency and enterococcus in culture. Follow up on final sensitivity data. He likely has some functional/anatomic disturbance and you would need to consider referral to urology if he becomes agreeable as an outpatient. Any option/timing or threshold for further workup of functional/anatomic disturbance would be left to urology and if found and treated, may help minimize risk for relapse. He will consider his options.     --Acute right forearm cellulitis. Improving with supportive measures and antibiotics. Monitor. No evidence for abscess or necrotic focus. Unclear based on patient's history whether this was present at admission  or a result of peripheral IV after admission.     --Acute left upper buttock cellulitis. Unclear if this was related to pressure or some other cutaneous trauma. It is improving with no obvious abscess or necrotic focus. Continue supportive measures and antibiotics.     --Chronic kidney disease, stage IV. Monitor to help guide further adjustments and antimicrobials     --Dyspnea. Cough improved and at least CHF but also possibly some component of respiratory tract infection. Empiric antibiotics ongoing. Cough not productive and no hemoptysis. If he does not steadily improve consideration could be given to repeat chest imaging     --Cardiac arrest with ventricular fibrillation and prolonged QT interval.     --Severe/advanced cardiomyopathy with CHF per cardiology    PLAN:    --IV Zosyn/doxycycline. Daptomycin times one     --I discussed with family;  ?rehab/SNF given debility      --Discussed with Dr. Serna and micro and case mangement     --Partial history per nursing staff, discussed with microbiology and discussed with family     --Highly complex set of issues with high risk for further serious morbidity and other serious sequela with generally guarded long-term prognosis    Elian Valdez MD  6/9/2017

## 2017-06-09 NOTE — PROGRESS NOTES
[facility] Medicine Services  INPATIENT PROGRESS NOTE    Date of Admission: 6/5/2017  Length of Stay: 4  Primary Care Physician: No Known Provider    Subjective   CC:  Dyspnea  HPI:    Weak and tired. Slept poorly and agitated. No f/c. No n/v. Intermittent dyspnea.    Review Of Systems:   Review of Systems   Constitutional: Positive for activity change and fatigue.   HENT: Negative.    Respiratory: Positive for cough.    Cardiovascular: Negative.  Negative for chest pain and palpitations.   Gastrointestinal: Negative.    Genitourinary: Positive for difficulty urinating and frequency.   Musculoskeletal: Negative.    Neurological: Negative.    Hematological: Negative.    Psychiatric/Behavioral: Negative.          Objective      Temp:  [97.8 °F (36.6 °C)-98.5 °F (36.9 °C)] 98.5 °F (36.9 °C)  Heart Rate:  [] 83  Resp:  [16] 16  BP: (113-136)/(64-94) 128/64  Physical Exam   Constitutional: He is oriented to person, place, and time. He appears well-developed.   HENT:   Head: Normocephalic.   Mouth/Throat: Oropharynx is clear and moist.   Eyes: Conjunctivae are normal. Pupils are equal, round, and reactive to light.   Neck: Normal range of motion. No thyromegaly present.   Cardiovascular: Normal rate, regular rhythm and normal heart sounds.    Pulmonary/Chest: Effort normal and breath sounds normal. No respiratory distress.   Abdominal: Soft. Bowel sounds are normal. He exhibits no distension. There is no tenderness.   Musculoskeletal: Normal range of motion.   Lymphadenopathy:     He has no cervical adenopathy.   Neurological: He is alert and oriented to person, place, and time.   Skin: Skin is warm. No rash noted.   Vitals reviewed.        Results Review:    I have reviewed the labs, radiology results and diagnostic studies.      Results from last 7 days  Lab Units 06/09/17  0458   WBC 10*3/mm3 7.96   HEMOGLOBIN g/dL 12.2*   PLATELETS 10*3/mm3 162       Results from last 7 days  Lab Units  06/09/17  0458   SODIUM mmol/L 138   POTASSIUM mmol/L 3.3*   CHLORIDE mmol/L 105   TOTAL CO2 mmol/L 23.0   BUN mg/dL 26*   CREATININE mg/dL 1.60*   GLUCOSE mg/dL 157*   CALCIUM mg/dL 9.9       Culture Data: Cultures:    Blood Culture   Date Value Ref Range Status   06/05/2017 Streptococcus, Beta Hemolytic, Group C (A)  Preliminary   06/05/2017 Streptococcus, Beta Hemolytic, Group C (A)  Preliminary     Comment:     SEE CULTURE [id number] FOR SUSCEPTIBILITIES     Urine Culture   Date Value Ref Range Status   06/05/2017 30,000-40,000 CFU/mL Enterococcus species (A)  Preliminary       Radiology Data:     I have reviewed the medications.    Assessment/Plan     Problem List  Hospital Problem List     * (Principal)Cardiac arrest with ventricular fibrillation    Cardiomyopathy    Overview Signed 6/16/2016  7:18 PM by Jessica Duong MD     Impression: 12/02/2014 - sees Dr Monique- doing well  Impression: 04/07/2014 - sees  cardiology next month; Description: ef less than 20%,, elevated right heart pressures         Chronic kidney disease, stage IV (severe)    Overview Addendum 11/2/2016  9:06 AM by Elian Naik     Impression: 08/31/2015 - update cmp  Impression: 12/02/2014 - update creatinine  Impression: 07/30/2014 - update cmp  Impression: 04/07/2014 - reviewed un and creatinine  have asked him to increase fluids  Impression: 03/17/2014 - update creat, increase diuretic due to edema;     1. CKD, stage 4:  a. Creatinine 2.0.   b. Creatinine 2.2 as of December 2014.           Atherosclerosis of native coronary artery of native heart    Overview Deleted 6/7/2017  9:51 AM by SEBASTIAN Malone            Diabetes mellitus    Overview Signed 6/16/2016  7:18 PM by Jessica Duong MD     Impression: 08/31/2015 - blood sugar is 169, hgn a1c 6.1%  is up to date with eye exam  no change neuropathy  ur alb neg  Impression: 04/07/2015 - blood sugar is 129, hgn a1c 6.1% (average 135)  is up to date with eye exam  no  low sugars  foot care is good  decreased pulses   ckd with neg alb   bp is good  Impression: 12/02/2014 - blood sugar and 90 day average sugar are good   nephropathy stable  neuropathy multifactorial- continue to monitor   is up to date with eye exam  no hypoglycemia  follow  Impression: 07/30/2014 - blood sugar and average sugar are good  has nephropathy, continue to monitor  stopped ace due to low bp and orthostasis  discussed with patient  snack or juice if shaky- concerned about low sugar;          Gastroesophageal reflux disease    Gout    Hyperlipidemia    Overview Signed 6/16/2016  7:18 PM by Jessica Duong MD     Impression: 08/31/2015 - check flp  Impression: 04/07/2015 - reviewed last lipids with him along with targets for chol with h/o cad  Impression: 12/02/2014 - check flp  Impression: 07/30/2014 - check flp;          Hypertension    Overview Signed 6/16/2016  7:18 PM by Jessica Duong MD     Impression: 08/31/2015 - bp is good  Impression: 12/02/2014 - bp is good  Impression: 07/30/2014 - bp is good, orthostatic changes- discussed increase po intake and slow change in posture  Impression: 02/20/2014 - bp is good currently;          Urinary tract infection    Overview Signed 6/16/2016  7:18 PM by Jessica Duong MD     Impression: 07/30/2014 - check ua;          Acute on chronic systolic congestive heart failure    Overview Signed 11/2/2016  9:04 AM by Elian Naik     2. CHF:  a. On 03/24/2014, EF less than 20%, moderate MR, mild to moderate TR, RVSP 45-50.    b. Remote angioplasty and MI, greater than 20 years prior, data deficient, historical patient of Dr. Llamas.  c. BNP 5000, most recent 1100 as of April 2014 and 300 as of July 2014.             Mild dementia    Right bundle branch block    Overview Signed 11/2/2016  9:05 AM by Elian Naik     3. Abnormal EKG:  a. Right bundle branch block secondary to CT change, QRS of 490 milliseconds.             Chronic midline low back  pain without sciatica    Lactic acidosis. Resolved    Prolonged QT interval          Positive blood cultures  --3/4 bottles, Group C strep  --[facility] managing/following antibiotics  Arm/Buttock cellulitis  --per ID, appear to be resolving  Vfib  --amiodarone per cardiology  DM  --decent control here  SHF  --appears compensated/poor EF  Urinary retention/probable BPH  --ditropan  CKD 4  Hx of CABG      DVT prophylaxis:  SC Heparin  Discharge Planning: I expect patient to be discharged home tomorrow.    Kranthi Serna MD   06/09/17   7:15 AM

## 2017-06-10 NOTE — PROGRESS NOTES
[facility] Medicine Services  INPATIENT PROGRESS NOTE    Date of Admission: 6/5/2017  Length of Stay: 5  Primary Care Physician: No Known Provider    Subjective   CC:  Dyspnea  HPI:  Sleeping at time of visit, easily awakens but falls back to sleep quickly. Family reports very confused/restless o/n, had not had much sleep in several days, but has slept very well today and much calmer. Pt at baseline mentation currently per wife, A&Ox 1 Pt denies pain, SOA, wife reports poor appetite. Limited ROS 2nd to drowsiness    Review Of Systems:   Review of Systems   Unable to perform ROS: Mental status change   Constitutional: Positive for activity change and fatigue.   HENT: Negative.    Cardiovascular: Negative.  Negative for chest pain.   Gastrointestinal: Negative.    Musculoskeletal: Negative.    Neurological: Negative.    Hematological: Negative.    Psychiatric/Behavioral: Positive for confusion.         Objective      Temp:  [96.4 °F (35.8 °C)-98 °F (36.7 °C)] 96.4 °F (35.8 °C)  Heart Rate:  [71-98] 78  Resp:  [16-18] 18  BP: (132-143)/(76-80) 143/80  Physical Exam   Nursing note and vitals reviewed.    Temp:  [96.4 °F (35.8 °C)-98 °F (36.7 °C)] 96.4 °F (35.8 °C)  Heart Rate:  [71-98] 78  Resp:  [16-18] 18  BP: (132-143)/(76-80) 143/80  Constitutional: frail appearing elderly man, resting and in no acute distress  Neck: supple, , trachea midline  Respiratory: fine rales in Left base, now wheezes/rhonchi,  nonlabored respirations   Cardiovascular: RRR, no murmurs, rubs, or gallops, palpable pedal pulses bilaterally  Gastrointestinal: Positive bowel sounds, soft, nontender, nondistended  Musculoskeletal: No bilateral ankle edema, no clubbing or cyanosis to bilateral lower extremities, wearing TEDS  Psychiatric:  calm , cooperative  Neurologic: oriented to person, knows wife, not oriented to time/situation/place; UE tremors  Skin: right distal UE erythema    Results Review:    I have reviewed  the labs, radiology results and diagnostic studies.      Results from last 7 days  Lab Units 06/09/17  0458   WBC 10*3/mm3 7.96   HEMOGLOBIN g/dL 12.2*   PLATELETS 10*3/mm3 162       Results from last 7 days  Lab Units 06/10/17  0406   SODIUM mmol/L 141   POTASSIUM mmol/L 3.6   CHLORIDE mmol/L 107   TOTAL CO2 mmol/L 24.0   BUN mg/dL 26*   CREATININE mg/dL 1.50*   GLUCOSE mg/dL 129*   CALCIUM mg/dL 10.2       Culture Data: Cultures:    Blood Culture   Date Value Ref Range Status   06/05/2017 Streptococcus, Beta Hemolytic, Group C (A)  Preliminary   06/05/2017 Streptococcus, Beta Hemolytic, Group C (A)  Preliminary     Comment:     SEE CULTURE [id number] FOR SUSCEPTIBILITIES     Urine Culture   Date Value Ref Range Status   06/05/2017 30,000-40,000 CFU/mL Enterococcus species (A)  Preliminary       Radiology Data:   · Left ventricular systolic function is severely decreased. Estimated EF = 15%.  · At least Mild mitral valve regurgitation is present  · Moderate tricuspid valve regurgitation is present.  · Mild aortic valve regurgitation is present.  · Calculated right ventricular systolic pressure from tricuspid regurgitation is 49 mmHg.  I have reviewed the medications.    Assessment/Plan     Problem List  Hospital Problem List     * (Principal)Cardiac arrest with ventricular fibrillation    Cardiomyopathy    Overview Signed 6/16/2016  7:18 PM by Jessica Duong MD     Impression: 12/02/2014 - sees Dr Monique- doing well  Impression: 04/07/2014 - sees  cardiology next month; Description: ef less than 20%,, elevated right heart pressures         Chronic kidney disease, stage IV (severe)    Overview Addendum 11/2/2016  9:06 AM by Elian Naik     Impression: 08/31/2015 - update cmp  Impression: 12/02/2014 - update creatinine  Impression: 07/30/2014 - update cmp  Impression: 04/07/2014 - reviewed un and creatinine  have asked him to increase fluids  Impression: 03/17/2014 - update creat, increase diuretic due to  edema;     1. CKD, stage 4:  a. Creatinine 2.0.   b. Creatinine 2.2 as of December 2014.           Atherosclerosis of native coronary artery of native heart    Overview Deleted 6/7/2017  9:51 AM by SEBASTIAN Malone            Diabetes mellitus    Overview Signed 6/16/2016  7:18 PM by Jessica Duong MD     Impression: 08/31/2015 - blood sugar is 169, hgn a1c 6.1%  is up to date with eye exam  no change neuropathy  ur alb neg  Impression: 04/07/2015 - blood sugar is 129, hgn a1c 6.1% (average 135)  is up to date with eye exam  no low sugars  foot care is good  decreased pulses   ckd with neg alb   bp is good  Impression: 12/02/2014 - blood sugar and 90 day average sugar are good   nephropathy stable  neuropathy multifactorial- continue to monitor   is up to date with eye exam  no hypoglycemia  follow  Impression: 07/30/2014 - blood sugar and average sugar are good  has nephropathy, continue to monitor  stopped ace due to low bp and orthostasis  discussed with patient  snack or juice if shaky- concerned about low sugar;          Gastroesophageal reflux disease    Gout    Hyperlipidemia    Overview Signed 6/16/2016  7:18 PM by Jessica Duong MD     Impression: 08/31/2015 - check flp  Impression: 04/07/2015 - reviewed last lipids with him along with targets for chol with h/o cad  Impression: 12/02/2014 - check flp  Impression: 07/30/2014 - check flp;          Hypertension    Overview Signed 6/16/2016  7:18 PM by Jessica Duong MD     Impression: 08/31/2015 - bp is good  Impression: 12/02/2014 - bp is good  Impression: 07/30/2014 - bp is good, orthostatic changes- discussed increase po intake and slow change in posture  Impression: 02/20/2014 - bp is good currently;          Urinary tract infection    Overview Signed 6/16/2016  7:18 PM by Jessica Duong MD     Impression: 07/30/2014 - check ua;          Acute on chronic systolic congestive heart failure    Overview Signed 11/2/2016  9:04  AM by Elian Naik     2. CHF:  a. On 03/24/2014, EF less than 20%, moderate MR, mild to moderate TR, RVSP 45-50.    b. Remote angioplasty and MI, greater than 20 years prior, data deficient, historical patient of Dr. Llamas.  c. BNP 5000, most recent 1100 as of April 2014 and 300 as of July 2014.             Mild dementia    Right bundle branch block    Overview Signed 11/2/2016  9:05 AM by Elian Naik     3. Abnormal EKG:  a. Right bundle branch block secondary to CT change, QRS of 490 milliseconds.             Chronic midline low back pain without sciatica    Lactic acidosis. Resolved    Prolonged QT interval          Positive blood cultures  --3/4 bottles, Beta hemolytic, Group C strep; repeat bcx 6/8 NGTD  --[facility] managing/following antibiotics  --No vegetation on TTE    Arm/Buttock cellulitis  --per ID, appears to be resolving    Acute complicated UTI w/urinary frequency  + Enterococcus ucx  -[facility] managing   -consider Urology followup at dc    Delirium with underlying Dementia  -continue low dose seroquel at night  -add melatonin    Vfib  --amiodarone per cardiology, Dr Monique    DM  --decent control here, SSI, add HS snack    SHF  --appears compensated/ poor EF 15%  --No AV santhosh blocking other than amio w trifascicular block    Urinary retention/probable BPH  --ditropan    CKD 4  -Cr stable  Hx of CABG    Generalized Weakness  -PT/OT consulted, agreeable to rehab as likely will need prior to return home  AND code status    DVT prophylaxis:  SC Heparin  Discharge Planning: I expect patient to be discharged to rehab when bed available, CM assisting with referral    Casie M Mayne, PA-C   06/10/17   1:29 PM

## 2017-06-10 NOTE — PROGRESS NOTES
[facility] Progress Note    6/5/2017      Antibiotics:  IV Anti-Infectives     Ordered     Dose/Rate Route Frequency Start Stop    06/10/17 0846  ampicillin (PRINCIPEN) capsule 250 mg     Ordering Provider:  Elian Valdez MD    250 mg Oral Every 6 Hours Scheduled 06/10/17 1200      06/10/17 0844  cefTRIAXone (ROCEPHIN) IVPB 1 g     Ordering Provider:  Elian Valdez MD    1 g  over 30 Minutes Intravenous Every 24 Hours 06/10/17 0915      06/08/17 0856  DAPTOmycin (CUBICIN) 300 mg in sodium chloride 0.9 % 50 mL IVPB     Comments:  Dosed at 3 mg/kg (rounded)   Ordering Provider:  Elian Valdez MD    300 mg  100 mL/hr over 30 Minutes Intravenous Once 06/08/17 0930 06/08/17 1458    06/07/17 0953  doxycycline (VIBRAMYCIN) capsule 100 mg     Ordering Provider:  Asael Bess MD    100 mg Oral 2 Times Daily With Meals 06/07/17 1800 06/12/17 1759    06/05/17 2208  piperacillin-tazobactam (ZOSYN) 4.5 g/100 mL 0.9% NS IVPB (mbp)     Ordering Provider:  Jv Zuleta MD    4.5 g Intravenous Once 06/05/17 2210 06/05/17 2347          CC:Dyspnea    HPI:  Patient is a 87 y.o. Yr old male with history of advanced cardiomyopathy/prolonged QT with advanced chronic kidney disease in addition to type 2 diabetes and other numerous comorbidity. He is admitted on June 5, 2017 with subjective fever/chills, nausea/vomiting and dyspnea, had V. fib in the emergency room and converted. He was started on amiodarone. He has been found to have strep bacteremia, enterococcal UTI with urinary frequency.        He is not a good historian in general.     6/10/17 daughter present this morning is concerned about his general debility and fluctuating confusion associated with poor sleep and has baseline dementia ; He reports vague right arm redness/pain but cannot recall exactly when it started. Family thinks it may have been from an IV but not completely sure wasn't there prior to admission. It is improving with antibiotics. This is at the  "forearm. He describes pain which is dull, constant, nonradiating, worse with palpation, better with antibiotics and supportive measures. He has urinary frequency but denies dysuria hematuria or pyuria. No hesitancy urgency or flank pain and he does not want urology input. No headache photophobia or neck stiffness. He had low-grade cough, now better and shortness of breath improving since admission. No hemoptysis. No diarrhea. No abdominal pain. Denies any other musculoskeletal pain.    ROS:      6/10/17 No f/c/s. No n/v/d. No rash. No new ADR to Abx.     Constitutional-- generally fatigued with poor appetite  Heent-- No new vision, hearing or throat complaints.  No epistaxis or oral sores.  Denies odynophagia or dysphagia.  No flashers, floaters or eye pain. No odynophagia or dysphagia. No headache, photophobia or neck stiffness.  CV-- No chest pain, palpitation or syncope  Resp-- as above  GI- No nausea, vomiting, or diarrhea.  No hematochezia, melena, or hematemesis. Denies jaundice or chronic liver disease.  -- No dysuria, hematuria, or flank pain.  Denies hesitancy, urgency or flank pain.  Lymph- no swollen lymph nodes in neck/axilla or groin.   Heme- No active bruising or bleeding; no Hx of DVT or PE.  MS-- no swelling or pain in the bones or joints of arms/legs.  No new back pain.  Neuro-- No acute focal weakness or numbness in the arms or legs.  No seizures. Baseline dementia and generally confused    Full 12 point review of systems reviewed and negative otherwise for acute complaints, other than above      PE:   /80 (BP Location: Right arm, Patient Position: Sitting)  Pulse 78  Temp 96.4 °F (35.8 °C) (Axillary)   Resp 18  Ht 67\" (170.2 cm)  Wt 153 lb (69.4 kg)  SpO2 95%  BMI 23.96 kg/m2    GENERAL: Awake with dementia, in no acute distress.   HEENT: Normocephalic, atraumatic.  PERRL. EOMI. No conjunctival injection. No icterus. Oropharynx clear without evidence of thrush or exudate. No " evidence of peridontal disease.    NECK: Supple without nuchal rigidity. No mass.  LYMPH: No cervical, axillary or inguinal lymphadenopathy.  HEART: RRR; No murmur, rubs, gallops.   LUNGS: Clear to auscultation bilaterally without wheezing, rales, rhonchi. Normal respiratory effort. Nonlabored. No dullness.  ABDOMEN: Soft, nontender, nondistended. Positive bowel sounds. No rebound or guarding. NO mass or HSM.  EXT:  No cyanosis, clubbing or edema. No cord.  : Genitalia generally unremarkable.  Without Puentes catheter.  MSK: FROM without joint effusions noted arms/legs.    SKIN: Warm and dry without cutaneous eruptions on Inspection/palpation.    NEURO: Moves all 4's but generally weak    Right forearm vaguely red/warm with no palpable cord. No open wounds or active drainage. Slight tenderness but no discrete mass bulge or fluctuance. No crepitus or bulla.      No peripheral stigmata/phenomena of endocarditis     Left upper buttock with small area of erythema induration warmth and tenderness but faded in intensity and no discrete mass bulge or fluctuance. No crepitus or bulla and no open wound or drainage        Laboratory Data      Results from last 7 days  Lab Units 06/09/17  0458 06/08/17  0422 06/07/17  0325   WBC 10*3/mm3 7.96 8.27 11.41*   HEMOGLOBIN g/dL 12.2* 12.2* 12.3*   HEMATOCRIT % 37.9* 37.3* 38.7*   PLATELETS 10*3/mm3 162 132* 135*       Results from last 7 days  Lab Units 06/10/17  0406   SODIUM mmol/L 141   POTASSIUM mmol/L 3.6   CHLORIDE mmol/L 107   TOTAL CO2 mmol/L 24.0   BUN mg/dL 26*   CREATININE mg/dL 1.50*   GLUCOSE mg/dL 129*   CALCIUM mg/dL 10.2       Results from last 7 days  Lab Units 06/09/17  0458   ALK PHOS U/L 56   BILIRUBIN mg/dL 0.7   ALT (SGPT) U/L 22   AST (SGOT) U/L 22           Results from last 7 days  Lab Units 06/05/17  1956   CRP mg/dL 12.50*       Estimated Creatinine Clearance: 32.4 mL/min (by C-G formula based on Cr of 1.5).      Microbiology:      Radiology:  Imaging  Results (last 72 hours)     Procedure Component Value Units Date/Time    XR Chest 1 View [[id number]] Collected:  06/07/17 0934     Updated:  06/07/17 1020    Narrative:       EXAMINATION: XR CHEST 1 VIEW-06/07/2017:      INDICATION: Congestive heart failure. Questionable pneumonia;  I46.9-Cardiac arrest, cause unspecified; I49.01-Ventricular  fibrillation; N39.0-Urinary tract infection, site not specified;  I50.22-Chronic systolic (congestive) heart failure; A41.9-Sepsis,  unspecified organism; Z66-Do not resuscitate.      COMPARISON: 06/05/2017.     FINDINGS: The heart is large but compensated. There is no acute  inflammatory process. There is no mass or effusion.           Impression:       Cardiomegaly, compensated. There are no acute pulmonary  findings.     D:  06/07/2017  E:  06/07/2017     This report was finalized on 6/7/2017 10:18 AM by Dr. Asa Connelly MD.               Impression:   --Acute strep bacteremia. In 2 sets and I suspect a pathogen. Concerns with respect to source include skin/soft tissue and respiratory tract. Vague chest x-ray changes with possible pneumonia. He does have several areas of soft tissue inflammation including left upper buttock and right arm. No other obvious musculoskeletal focus. Seems to be improving with empiric antibiotics with no stigmata of endocarditis. Transthoracic echocardiogram no vegetation, discussed directly with Dr. Monique.  Follow-up cultures pending.     --Acute complicated UTI with urinary frequency and enterococcus in culture. He likely has some functional/anatomic disturbance and you would need to consider referral to urology if he becomes agreeable as an outpatient. Any option/timing or threshold for further workup of functional/anatomic disturbance would be left to urology and if found and treated, may help minimize risk for relapse. He will consider his options.     --Acute right forearm cellulitis. Improving with supportive measures and antibiotics.  Monitor. No evidence for abscess or necrotic focus. Unclear based on patient's history whether this was present at admission or a result of peripheral IV after admission.     --Acute left upper buttock cellulitis. Unclear if this was related to pressure or some other cutaneous trauma. It is improving with no obvious abscess or necrotic focus. Continue supportive measures and antibiotics.     --Chronic kidney disease, stage IV. Monitor to help guide further adjustments and antimicrobials     --Dyspnea. Cough improved and at least CHF but also possibly some component of respiratory tract infection. Empiric antibiotics ongoing. Cough not productive and no hemoptysis. If he does not steadily improve consideration could be given to repeat chest imaging     --Cardiac arrest with ventricular fibrillation and prolonged QT interval.     --Severe/advanced cardiomyopathy with CHF per cardiology    PLAN:    --IV rocephin/ Oral ampicillin, doxy     --I discussed with family;  ?rehab/SNF given debility      --Discussed with Dr. Byrnes and micro and case mangement     --Partial history per nursing staff, discussed with microbiology and discussed with family     --Highly complex set of issues with high risk for further serious morbidity and other serious sequela with generally guarded long-term prognosis    --If you lose IV access,  Rocephin can be given IM    Eilan Valdez MD  6/10/2017

## 2017-06-11 NOTE — THERAPY EVALUATION
Acute Care - Physical Therapy Initial Evaluation  [facility]     Patient Name: José Miguel Mackey Jr.  : 1929  MRN: [id number]  Today's Date: 2017   Onset of Illness/Injury or Date of Surgery Date: 17  Date of Referral to PT: 17  Referring Physician: MD Eneida      Admit Date: 2017     Visit Dx:    ICD-10-CM ICD-9-CM   1. Cardiac arrest with ventricular fibrillation I46.9 427.5    I49.01 427.41   2. Acute UTI N39.0 599.0   3. Chronic systolic congestive heart failure I50.22 428.22     428.0   4. Sepsis, due to unspecified organism A41.9 038.9     995.91   5. Do-not-intubate resuscitation status Z66 V49.86   6. Impaired functional mobility, balance, gait, and endurance Z74.09 V49.89   7. Impaired mobility and ADLs Z74.09 799.89     Patient Active Problem List   Diagnosis   • Abdominal pain   • Anemia   • Ascites   • Benign essential hypertension   • Cardiomyopathy   • Chronic kidney disease, stage IV (severe)   • Confusional state   • Constipation   • Atherosclerosis of native coronary artery of native heart   • Dementia   • Depression   • Diabetes mellitus   • Edema   • Gastroesophageal reflux disease   • Gout   • Arthralgia of hip   • Hyperlipidemia   • Hypertension   • Knee pain   • Memory loss   • Osteoarthritis of knee   • Shortness of breath   • Sinus bradycardia   • Swelling of lower extremity   • Tremor   • Urinary tract infection   • Acute on chronic systolic congestive heart failure   • Dyslipidemia   • Mild dementia   • Right bundle branch block   • Weakness of both legs   • Chronic midline low back pain without sciatica   • Ventricular fibrillation   • Lactic acidosis. Resolved   • Prolonged QT interval   • Cardiac arrest with ventricular fibrillation     Past Medical History:   Diagnosis Date   • CHF (congestive heart failure)    • CKD stage 4 due to type 2 diabetes mellitus    • Diabetes mellitus    • Dyslipidemia    • Gout    • History of acute myocardial infarction    •  Hypertension    • Mild dementia    • Pelvic fracture     Remote pelvic fracture with chronic hip pain.    • Right bundle branch block      Past Surgical History:   Procedure Laterality Date   • ABDOMINAL SURGERY     • ANKLE SURGERY      Remote ankle surgery.     • CARDIAC SURGERY     • KIDNEY SURGERY            PT ASSESSMENT (last 72 hours)      PT Evaluation       06/11/17 0800 06/10/17 1320    Rehab Evaluation    Document Type evaluation   error: actual time in was 8:30 am, SR  -SR     Subjective Information agree to therapy;complains of;weakness;fatigue  -SR     Evaluation Not Performed  patient unavailable for evaluation   PA assessing;pt.can't stay awake;restless/conf. last night;poor sleep past few days; fluct. conf.level; will check back tomorrow   -DM    Patient Effort, Rehab Treatment good  -SR     Symptoms Noted During/After Treatment fatigue  -SR     General Information    Patient Profile Review yes  -SR     Onset of Illness/Injury or Date of Surgery Date 06/05/17  -SR     Referring Physician MD Eneida  -SR     General Observations Pt is supine in bed, daughter present.   -SR     Pertinent History Of Current Problem Pt admitted to ER with generalized pain and became unresponsive in ER. CPR was initiated and pt had run of ventricular fibrillation.   -SR     Precautions/Limitations fall precautions  -SR     Prior Level of Function independent:;all household mobility;community mobility;min assist:;ADL's  -SR     Equipment Currently Used at Home cane, straight   has rw and wc, but does not use  -SR     Plans/Goals Discussed With patient and family;agreed upon  -SR     Risks Reviewed patient and family:;LOB;increased discomfort;change in vital signs  -SR     Benefits Reviewed patient and family:;improve function;increase independence;increase strength;increase balance;increase knowledge;decrease pain  -SR     Barriers to Rehab cognitive status  -SR     Living Environment    Lives With spouse  -SR     Living  Arrangements house  -SR     Home Accessibility stairs to enter home  -SR     Number of Stairs to Enter Home 2  -SR     Stair Railings at Home none  -SR     Living Environment Comment wife is in somewhat poor health, unable to stand for long per daughter  -SR     Clinical Impression    Date of Referral to PT 06/09/17  -SR     PT Diagnosis weakness  -SR     Patient/Family Goals Statement to gain strength  -SR     Criteria for Skilled Therapeutic Interventions Met yes;treatment indicated  -SR     Rehab Potential good, to achieve stated therapy goals  -SR     Vital Signs    Pre Systolic BP Rehab 117  -SR     Pre Treatment Diastolic BP 69  -SR     Post Systolic BP Rehab 113  -SR     Post Treatment Diastolic BP 66  -SR     Pretreatment Heart Rate (beats/min) 62  -SR     Posttreatment Heart Rate (beats/min) 65  -SR     Pre SpO2 (%) 99  -SR     O2 Delivery Pre Treatment room air  -SR     Post SpO2 (%) 99  -SR     O2 Delivery Post Treatment room air  -SR     Pre Patient Position Supine  -SR     Intra Patient Position Standing  -SR     Post Patient Position Sitting  -SR     Pain Assessment    Pain Assessment Cunningham-Queen FACES  -SR     Cunningham-Queen FACES Pain Rating 2  -SR     Pain Score 3  -SV     Pain Location Generalized  -SR     Pain Intervention(s) Repositioned;Ambulation/increased activity  -SR     Response to Interventions tolerated  -SR     Cognitive Assessment/Intervention    Current Cognitive/Communication Assessment impaired  -SR     Orientation Status oriented to;person   otherwise disoriented  -SR     Follows Commands/Answers Questions 100% of the time;needs cueing;needs increased time;needs repetition  -SR     Personal Safety mild impairment  -SR     Personal Safety Interventions fall prevention program maintained;gait belt;nonskid shoes/slippers when out of bed  -SR     ROM (Range of Motion)    General ROM no range of motion deficits identified   BLE   -SR     MMT (Manual Muscle Testing)    General MMT Assessment  lower extremity strength deficits identified  -SR     General MMT Assessment Detail BLE 3/5, weakness especially in hips  -SR     Bed Mobility, Assessment/Treatment    Bed Mobility, Assistive Device bed rails;head of bed elevated;draw sheet  -SR     Bed Mob, Supine to Sit, [place] moderate assist (50% patient effort);2 person assist required;verbal cues required  -SR     Bed Mobility, Comment cues for mechanics  -SR     Transfer Assessment/Treatment    Transfers, Sit-Stand [place] minimum assist (75% patient effort);2 person assist required;verbal cues required  -SR     Transfers, Stand-Sit [place] minimum assist (75% patient effort);2 person assist required;verbal cues required  -SR     Transfers, Sit-Stand-Sit, Assist Device rolling walker  -SR     Transfer, Comment cues needed for hand placement  -SR     Gait Assessment/Treatment    Gait, [place] Level minimum assist (75% patient effort);2 person assist required;verbal cues required  -SR     Gait, Assistive Device rolling walker  -SR     Gait, Distance (Feet) 30  -SR     Gait, Gait Deviations ataxia;dani decreased;forward flexed posture;narrow base;step length decreased;stride length decreased  -SR     Gait, Safety Issues weight-shifting ability decreased;sequencing ability decreased;step length decreased  -SR     Gait, Impairments impaired balance;strength decreased;coordination impaired  -SR     Gait, Comment Pt with poor balance and endurance, shaky thoughout.   -SR     Sensory Assessment/Intervention    Light Touch LLE;RLE  -SR     LLE Light Touch WNL  -SR     RLE Light Touch WNL  -SR     Positioning and Restraints    Pre-Treatment Position in bed  -SR     Post Treatment Position chair  -SR     In Chair notified nsg;reclined;sitting;call light within reach;encouraged to call for assist;exit alarm on;with family/caregiver  -SR       06/09/17 1532       Rehab Evaluation    Evaluation Not Performed patient unavailable for evaluation   in  am patient was confused and agitated unable to participate in PT now patient is lethargic from meds we will check back on patient tomorrow  -SRAVANTHI       User Key  (r) = Recorded By, (t) = Taken By, (c) = Cosigned By    Initials Name Provider Type    SRAVANTHI Gallardo, PT Physical Therapist    CARMEN Norwood, PT Physical Therapist    SR Kelsi Tobias, PT Physical Therapist    MANFRED Groves, RN Registered Nurse          Physical Therapy Education     Title: PT OT SLP Therapies (Active)     Topic: Physical Therapy (Active)     Point: Mobility training (Active)    Learning Progress Summary    Learner Readiness Method Response Comment Documented by Status   Patient Acceptance E,D NR  SR 06/11/17 0921 Active               Point: Body mechanics (Active)    Learning Progress Summary    Learner Readiness Method Response Comment Documented by Status   Patient Acceptance E,D NR  SR 06/11/17 0921 Active               Point: Precautions (Active)    Learning Progress Summary    Learner Readiness Method Response Comment Documented by Status   Patient Acceptance E,D NR  SR 06/11/17 0921 Active                      User Key     Initials Effective Dates Name Provider Type Discipline    SR 06/19/15 -  Kelsi Tobias, PT Physical Therapist PT                PT Recommendation and Plan  Anticipated Discharge Disposition: inpatient rehabilitation facility  PT Frequency: daily, per priority policy  Plan of Care Review  Plan Of Care Reviewed With: patient, daughter  Outcome Summary/Follow up Plan: Pt demo generalized weakness, poor balance, and impaired cognition/safety awareness with mobility. He would benefit from cont IPPT and inpatient rehab prior to dc home.          IP PT Goals       06/11/17 0922          Bed Mobility PT LTG    Bed Mobility PT LTG, Date Established 06/11/17  -SR      Bed Mobility PT LTG, Time to Achieve by discharge  -SR      Bed Mobility PT LTG, Activity Type all bed mobility  -SR      Bed Mobility PT  LTG, [place] Level independent  -SR      Transfer Training PT LTG    Transfer Training PT LTG, Date Established 06/11/17  -SR      Transfer Training PT LTG, Time to Achieve by discharge  -SR      Transfer Training PT LTG, Activity Type all transfers  -SR      Transfer Training PT LTG, [place] Level contact guard assist  -SR      Transfer Training PT LTG, Assist Device walker, rolling  -SR      Gait Training PT LTG    Gait Training Goal PT LTG, Date Established 06/11/17  -SR      Gait Training Goal PT LTG, Time to Achieve by discharge  -SR      Gait Training Goal PT LTG, [place] Level contact guard assist  -SR      Gait Training Goal PT LTG, Assist Device walker, rolling  -SR      Gait Training Goal PT LTG, Distance to Achieve 150  -SR        User Key  (r) = Recorded By, (t) = Taken By, (c) = Cosigned By    Initials Name Provider Type    SR Kelsi Tobias PT Physical Therapist                Outcome Measures       06/11/17 0830          How much help from another person do you currently need...    Turning from your back to your side while in flat bed without using bedrails? 4  -SR      Moving from lying on back to sitting on the side of a flat bed without bedrails? 2  -SR      Moving to and from a bed to a chair (including a wheelchair)? 3  -SR      Standing up from a chair using your arms (e.g., wheelchair, bedside chair)? 3  -SR      Climbing 3-5 steps with a railing? 2  -SR      To walk in hospital room? 3  -SR      AM-PAC 6 Clicks Score 17  -SR      Functional Assessment    Outcome Measure Options AM-PAC 6 Clicks Basic Mobility (PT)  -SR        User Key  (r) = Recorded By, (t) = Taken By, (c) = Cosigned By    Initials Name Provider Type    SR Kelsi Tobias PT Physical Therapist           Time Calculation:         PT Charges       06/11/17 0925          Time Calculation    Start Time 0830  -SR      PT Received On 06/11/17  -SR      PT Goal Re-Cert Due Date 06/21/17  -SR        User Key  (r) =  Recorded By, (t) = Taken By, (c) = Cosigned By    Initials Name Provider Type    SR Kelsi Tobias, PT Physical Therapist          Therapy Charges for Today     Code Description Service Date Service Provider Modifiers Qty    07152343366 HC PT EVAL HIGH COMPLEXITY 4 6/11/2017 Kelsi Tobias, PT GP 1          PT G-Codes  Outcome Measure Options: AM-PAC 6 Clicks Basic Mobility (PT)      Kelsi Tobias PT  6/11/2017

## 2017-06-11 NOTE — PLAN OF CARE
Problem: Patient Care Overview (Adult)  Goal: Plan of Care Review  Outcome: Ongoing (interventions implemented as appropriate)    06/11/17 0940   Coping/Psychosocial Response Interventions   Plan Of Care Reviewed With patient   Outcome Evaluation   Outcome Summary/Follow up Plan Pt presents with limitations in cognition, strength, balance, ADL performance, and mobility, and will benefit from OT to advance pt toward increased safety and independence. Pt would benefit from IPOT upon d/c.          Problem: Inpatient Occupational Therapy  Goal: Bed Mobility Goal LTG- OT  Outcome: Ongoing (interventions implemented as appropriate)    06/11/17 0940   Bed Mobility OT LTG   Bed Mobility OT LTG, Date Established 06/11/17   Bed Mobility OT LTG, Time to Achieve by discharge   Bed Mobility OT LTG, Activity Type supine to sit/sit to supine   Bed Mobility OT LTG, [place] Level minimum assist (75% patient effort)   Bed Mobility OT LTG, Assist Device bed rails       Goal: Strength Goal LTG- OT  Outcome: Ongoing (interventions implemented as appropriate)    06/11/17 0940   Strength OT LTG   Strength Goal OT LTG, Date Established 06/11/17   Strength Goal OT LTG, Time to Achieve by discharge   Strength Goal OT LTG, Measure to Achieve Pt will increase BUE strenghth 1 MMG as needed to support ADLs       Goal: LB Dressing Goal LTG- OT  Outcome: Ongoing (interventions implemented as appropriate)    06/11/17 0940   LB Dressing OT LTG   LB Dressing Goal OT LTG, Date Established 06/11/17   LB Dressing Goal OT LTG, Time to Achieve by discharge   LB Dressing Goal OT LTG, [place] Level contact guard assist       Goal: Functional Mobility Goal LTG- OT  Outcome: Ongoing (interventions implemented as appropriate)    06/11/17 0940   Functional Mobility OT LTG   Functional Mobility Goal OT LTG, Date Established 06/11/17   Functional Mobility Goal OT LTG, Time to Achieve by discharge   Functional Mobility Goal OT LTG, [place] Level  contact guard   Functional Mobility Goal OT LTG, Assist Device rolling walker   Functional Mobility Goal OT LTG, Distance to Achieve in hallway;to the bathroom

## 2017-06-11 NOTE — PROGRESS NOTES
[facility] Progress Note    6/5/2017      Antibiotics:  IV Anti-Infectives     Ordered     Dose/Rate Route Frequency Start Stop    06/10/17 0846  ampicillin (PRINCIPEN) capsule 250 mg     Ordering Provider:  Elian Valdez MD    250 mg Oral Every 6 Hours Scheduled 06/10/17 1200      06/10/17 0844  cefTRIAXone (ROCEPHIN) IVPB 1 g     Ordering Provider:  Elian Valdez MD    1 g  over 30 Minutes Intravenous Daily 06/10/17 1000      06/08/17 0856  DAPTOmycin (CUBICIN) 300 mg in sodium chloride 0.9 % 50 mL IVPB     Comments:  Dosed at 3 mg/kg (rounded)   Ordering Provider:  Elian Valdez MD    300 mg  100 mL/hr over 30 Minutes Intravenous Once 06/08/17 0930 06/08/17 1458    06/07/17 0953  doxycycline (VIBRAMYCIN) capsule 100 mg     Elian Valdez MD reviewed the order on 06/10/17 0849.   Ordering Provider:  Elian Valdez MD    100 mg Oral 2 Times Daily With Meals 06/07/17 1800 06/15/17 0847    06/05/17 2208  piperacillin-tazobactam (ZOSYN) 4.5 g/100 mL 0.9% NS IVPB (mbp)     Ordering Provider:  Jv Zuleta MD    4.5 g Intravenous Once 06/05/17 2210 06/05/17 2347          CC:Dyspnea    HPI:  Patient is a 87 y.o. Yr old male with history of advanced cardiomyopathy/prolonged QT with advanced chronic kidney disease in addition to type 2 diabetes and other numerous comorbidity. He is admitted on June 5, 2017 with subjective fever/chills, nausea/vomiting and dyspnea, had V. fib in the emergency room and converted. He was started on amiodarone. He has been found to have strep bacteremia, enterococcal UTI with urinary frequency.        He is not a good historian in general.     6/11/17Nursing reports general debility and fluctuating confusion associated with poor sleep and has baseline dementia ; nursing says no new pain or problems otherwise  ROS:      6/11/17 per nursing No f/c/s. No n/v/d. No rash. No new ADR to Abx.     Constitutional-- generally fatigued with poor appetite  Heent-- No new vision,  "hearing or throat complaints.  No epistaxis or oral sores.  Denies odynophagia or dysphagia.  No flashers, floaters or eye pain. No odynophagia or dysphagia. No headache, photophobia or neck stiffness.  CV-- No chest pain, palpitation or syncope  Resp-- as above  GI- No nausea, vomiting, or diarrhea.  No hematochezia, melena, or hematemesis. Denies jaundice or chronic liver disease.  -- No dysuria, hematuria, or flank pain.  Denies hesitancy, urgency or flank pain.  Lymph- no swollen lymph nodes in neck/axilla or groin.   Heme- No active bruising or bleeding; no Hx of DVT or PE.  MS-- no swelling or pain in the bones or joints of arms/legs.  No new back pain.  Neuro-- No acute focal weakness or numbness in the arms or legs.  No seizures. Baseline dementia and generally confused    Full 12 point review of systems reviewed and negative otherwise for acute complaints, other than above      PE:   /54 (BP Location: Right arm, Patient Position: Lying)  Pulse 58  Temp 97.4 °F (36.3 °C) (Oral)   Resp 18  Ht 67\" (170.2 cm)  Wt 153 lb (69.4 kg)  SpO2 98%  BMI 23.96 kg/m2    GENERAL: seen early, confused/dementia, in no acute distress.   HEENT: Normocephalic, atraumatic.  PERRL. EOMI. No conjunctival injection. No icterus. Oropharynx clear without evidence of thrush or exudate. No evidence of peridontal disease.    NECK: Supple without nuchal rigidity. No mass.  LYMPH: No cervical, axillary or inguinal lymphadenopathy.  HEART: RRR; No murmur, rubs, gallops.   LUNGS: Clear to auscultation bilaterally without wheezing, rales, rhonchi. Normal respiratory effort. Nonlabored. No dullness.  ABDOMEN: Soft, nontender, nondistended. Positive bowel sounds. No rebound or guarding. NO mass or HSM.  EXT:  No cyanosis, clubbing or edema. No cord.  : Genitalia generally unremarkable.  Without Puentes catheter.  MSK: FROM without joint effusions noted arms/legs.    SKIN: Warm and dry without cutaneous eruptions on " Inspection/palpation.    NEURO: Moves all 4's but generally weak    Right forearm vaguely red/warm with no palpable cord. No open wounds or active drainage. Slight tenderness but no discrete mass bulge or fluctuance. No crepitus or bulla.      No peripheral stigmata/phenomena of endocarditis     Left upper buttock with small area of erythema induration warmth and tenderness but faded in intensity and no discrete mass bulge or fluctuance. No crepitus or bulla and no open wound or drainage        Laboratory Data      Results from last 7 days  Lab Units 06/09/17  0458 06/08/17  0422 06/07/17  0325   WBC 10*3/mm3 7.96 8.27 11.41*   HEMOGLOBIN g/dL 12.2* 12.2* 12.3*   HEMATOCRIT % 37.9* 37.3* 38.7*   PLATELETS 10*3/mm3 162 132* 135*       Results from last 7 days  Lab Units 06/10/17  0406   SODIUM mmol/L 141   POTASSIUM mmol/L 3.6   CHLORIDE mmol/L 107   TOTAL CO2 mmol/L 24.0   BUN mg/dL 26*   CREATININE mg/dL 1.50*   GLUCOSE mg/dL 129*   CALCIUM mg/dL 10.2       Results from last 7 days  Lab Units 06/09/17  0458   ALK PHOS U/L 56   BILIRUBIN mg/dL 0.7   ALT (SGPT) U/L 22   AST (SGOT) U/L 22           Results from last 7 days  Lab Units 06/05/17  1956   CRP mg/dL 12.50*       Estimated Creatinine Clearance: 32.4 mL/min (by C-G formula based on Cr of 1.5).      Microbiology:      Radiology:  Imaging Results (last 72 hours)     Procedure Component Value Units Date/Time    XR Chest 1 View [[id number]] Collected:  06/07/17 0934     Updated:  06/07/17 1020    Narrative:       EXAMINATION: XR CHEST 1 VIEW-06/07/2017:      INDICATION: Congestive heart failure. Questionable pneumonia;  I46.9-Cardiac arrest, cause unspecified; I49.01-Ventricular  fibrillation; N39.0-Urinary tract infection, site not specified;  I50.22-Chronic systolic (congestive) heart failure; A41.9-Sepsis,  unspecified organism; Z66-Do not resuscitate.      COMPARISON: 06/05/2017.     FINDINGS: The heart is large but compensated. There is no acute  inflammatory  process. There is no mass or effusion.           Impression:       Cardiomegaly, compensated. There are no acute pulmonary  findings.     D:  06/07/2017  E:  06/07/2017     This report was finalized on 6/7/2017 10:18 AM by Dr. Asa Connelly MD.               Impression:   --Acute strep bacteremia. In 2 sets and I suspect a pathogen. Concerns with respect to source include skin/soft tissue and respiratory tract. Vague chest x-ray changes with possible pneumonia. He does have several areas of soft tissue inflammation including left upper buttock and right arm. No other obvious musculoskeletal focus. Seems to be improving with empiric antibiotics with no stigmata of endocarditis. Transthoracic echocardiogram no vegetation, discussed directly with Dr. Monique.  Follow-up cultures pending.     --Acute complicated UTI with urinary frequency and enterococcus in culture. He likely has some functional/anatomic disturbance and you would need to consider referral to urology if he becomes agreeable as an outpatient. Any option/timing or threshold for further workup of functional/anatomic disturbance would be left to urology and if found and treated, may help minimize risk for relapse. He will consider his options.     --Acute right forearm cellulitis. Improving with supportive measures and antibiotics. Monitor. No evidence for abscess or necrotic focus. Unclear based on patient's history whether this was present at admission or a result of peripheral IV after admission.     --Acute left upper buttock cellulitis. Unclear if this was related to pressure or some other cutaneous trauma. It is improving with no obvious abscess or necrotic focus. Continue supportive measures and antibiotics.     --Chronic kidney disease, stage IV. Monitor to help guide further adjustments and antimicrobials     --Dyspnea. Cough improved and at least CHF but also possibly some component of respiratory tract infection. Empiric antibiotics ongoing. Cough  not productive and no hemoptysis. If he does not steadily improve consideration could be given to repeat chest imaging     --Cardiac arrest with ventricular fibrillation and prolonged QT interval.     --Severe/advanced cardiomyopathy with CHF per cardiology    PLAN:    --IV rocephin/ Oral ampicillin, doxy     --I discussed with family;  ?rehab/SNF given debility      --Discussed with Dr. Byrnes and micro and case mangement     --Partial history per nursing staff, discussed with microbiology      --Highly complex set of issues with high risk for further serious morbidity and other serious sequela with generally guarded long-term prognosis    --If you lose IV access,  Rocephin can be given IM    Elian Valdez MD  6/11/2017

## 2017-06-11 NOTE — PLAN OF CARE
Problem: Patient Care Overview (Adult)  Goal: Plan of Care Review  Outcome: Ongoing (interventions implemented as appropriate)    06/11/17 0922   Coping/Psychosocial Response Interventions   Plan Of Care Reviewed With patient;daughter   Outcome Evaluation   Outcome Summary/Follow up Plan Pt demo generalized weakness, poor balance, and impaired cognition/safety awareness with mobility. He would benefit from cont IPPT and inpatient rehab prior to dc home.         Problem: Inpatient Physical Therapy  Goal: Bed Mobility Goal LTG- PT  Outcome: Ongoing (interventions implemented as appropriate)    06/11/17 0922   Bed Mobility PT LTG   Bed Mobility PT LTG, Date Established 06/11/17   Bed Mobility PT LTG, Time to Achieve by discharge   Bed Mobility PT LTG, Activity Type all bed mobility   Bed Mobility PT LTG, [place] Level independent       Goal: Transfer Training Goal 1 LTG- PT  Outcome: Ongoing (interventions implemented as appropriate)    06/11/17 0922   Transfer Training PT LTG   Transfer Training PT LTG, Date Established 06/11/17   Transfer Training PT LTG, Time to Achieve by discharge   Transfer Training PT LTG, Activity Type all transfers   Transfer Training PT LTG, [place] Level contact guard assist   Transfer Training PT LTG, Assist Device walker, rolling       Goal: Gait Training Goal LTG- PT  Outcome: Ongoing (interventions implemented as appropriate)    06/11/17 0922   Gait Training PT LTG   Gait Training Goal PT LTG, Date Established 06/11/17   Gait Training Goal PT LTG, Time to Achieve by discharge   Gait Training Goal PT LTG, [place] Level contact guard assist   Gait Training Goal PT LTG, Assist Device walker, rolling   Gait Training Goal PT LTG, Distance to Achieve 150

## 2017-06-11 NOTE — PLAN OF CARE
Problem: Patient Care Overview (Adult)  Goal: Plan of Care Review  Outcome: Ongoing (interventions implemented as appropriate)  Goal: Adult Individualization and Mutuality  Outcome: Ongoing (interventions implemented as appropriate)  Goal: Discharge Needs Assessment  Outcome: Ongoing (interventions implemented as appropriate)    Problem: Pressure Ulcer (Adult)  Goal: Signs and Symptoms of Listed Potential Problems Will be Absent or Manageable (Pressure Ulcer)  Outcome: Ongoing (interventions implemented as appropriate)    Problem: Arrhythmia/Dysrhythmia (Symptomatic) (Adult)  Goal: Signs and Symptoms of Listed Potential Problems Will be Absent or Manageable (Arrhythmia/Dysrhythmia)  Outcome: Ongoing (interventions implemented as appropriate)

## 2017-06-11 NOTE — THERAPY EVALUATION
Acute Care - Occupational Therapy Initial Evaluation  [facility]     Patient Name: José Miguel Mackey Jr.  : 1929  MRN: [id number]  Today's Date: 2017  Onset of Illness/Injury or Date of Surgery Date: 17  Date of Referral to OT: 06/10/17  Referring Physician: DO Fitz    Admit Date: 2017       ICD-10-CM ICD-9-CM   1. Cardiac arrest with ventricular fibrillation I46.9 427.5    I49.01 427.41   2. Acute UTI N39.0 599.0   3. Chronic systolic congestive heart failure I50.22 428.22     428.0   4. Sepsis, due to unspecified organism A41.9 038.9     995.91   5. Do-not-intubate resuscitation status Z66 V49.86   6. Impaired functional mobility, balance, gait, and endurance Z74.09 V49.89   7. Impaired mobility and ADLs Z74.09 799.89     Patient Active Problem List   Diagnosis   • Abdominal pain   • Anemia   • Ascites   • Benign essential hypertension   • Cardiomyopathy   • Chronic kidney disease, stage IV (severe)   • Confusional state   • Constipation   • Atherosclerosis of native coronary artery of native heart   • Dementia   • Depression   • Diabetes mellitus   • Edema   • Gastroesophageal reflux disease   • Gout   • Arthralgia of hip   • Hyperlipidemia   • Hypertension   • Knee pain   • Memory loss   • Osteoarthritis of knee   • Shortness of breath   • Sinus bradycardia   • Swelling of lower extremity   • Tremor   • Urinary tract infection   • Acute on chronic systolic congestive heart failure   • Dyslipidemia   • Mild dementia   • Right bundle branch block   • Weakness of both legs   • Chronic midline low back pain without sciatica   • Ventricular fibrillation   • Lactic acidosis. Resolved   • Prolonged QT interval   • Cardiac arrest with ventricular fibrillation     Past Medical History:   Diagnosis Date   • CHF (congestive heart failure)    • CKD stage 4 due to type 2 diabetes mellitus    • Diabetes mellitus    • Dyslipidemia    • Gout    • History of acute myocardial infarction    •  Hypertension    • Mild dementia    • Pelvic fracture     Remote pelvic fracture with chronic hip pain.    • Right bundle branch block      Past Surgical History:   Procedure Laterality Date   • ABDOMINAL SURGERY     • ANKLE SURGERY      Remote ankle surgery.     • CARDIAC SURGERY     • KIDNEY SURGERY            OT ASSESSMENT FLOWSHEET (last 72 hours)      OT Evaluation       06/11/17 0831 06/11/17 0800 06/10/17 1320 06/09/17 1532       Rehab Evaluation    Document Type evaluation  -AC evaluation   error: actual time in was 8:30 am, SR  -SR       Subjective Information agree to therapy;complains of;weakness;fatigue  -AC agree to therapy;complains of;weakness;fatigue  -SR       Evaluation Not Performed   patient unavailable for evaluation   PA assessing;pt.can't stay awake;restless/conf. last night;poor sleep past few days; fluct. conf.level; will check back tomorrow   -DM patient unavailable for evaluation   in am patient was confused and agitated unable to participate in PT now patient is lethargic from meds we will check back on patient tomorrow  -SRAVANTHI     Patient Effort, Rehab Treatment good  -AC good  -SR       Symptoms Noted During/After Treatment fatigue  -AC fatigue  -SR       General Information    Patient Profile Review yes  -AC yes  -SR       Onset of Illness/Injury or Date of Surgery Date 06/05/17  -AC 06/05/17  -SR       Referring Physician DO Fitz  -AC MD Eneida  -SR       General Observations Pt supine in bed. Daughter present in room.   -AC Pt is supine in bed, daughter present.   -SR       Pertinent History Of Current Problem Pt admit with vfib and converted in ER. Pt also with UTI, R arm cellulitis, strep bactremia and h/o dementia  -AC Pt admitted to ER with generalized pain and became unresponsive in ER. CPR was initiated and pt had run of ventricular fibrillation.   -SR       Precautions/Limitations fall precautions   exit alarm  -AC fall precautions  -SR       Prior Level of Function  independent:;all household mobility;ADL's  -AC independent:;all household mobility;community mobility;min assist:;ADL's  -SR       Equipment Currently Used at Home cane, straight   has RW and w/c , but does not use  -AC cane, straight   has rw and wc, but does not use  -SR       Plans/Goals Discussed With patient and family;agreed upon  -AC patient and family;agreed upon  -SR       Risks Reviewed patient and family:;LOB  -AC patient and family:;LOB;increased discomfort;change in vital signs  -SR       Benefits Reviewed patient and family:;improve function;increase independence;increase strength;increase balance;increase knowledge  -AC patient and family:;improve function;increase independence;increase strength;increase balance;increase knowledge;decrease pain  -SR       Barriers to Rehab cognitive status  -AC cognitive status  -SR       Living Environment    Lives With spouse  -AC spouse  -SR       Living Arrangements house  -AC house  -SR       Home Accessibility stairs to enter home  -AC stairs to enter home  -SR       Number of Stairs to Enter Home 2  -AC 2  -SR       Stair Railings at Home  none  -SR       Living Environment Comment  wife is in somewhat poor health, unable to stand for long per daughter  -SR       Clinical Impression    Date of Referral to OT 06/10/17  -AC        OT Diagnosis Decreased independence with self care  -AC        Patient/Family Goals Statement Pt would like to return to get stronger   -AC        Criteria for Skilled Therapeutic Interventions Met yes;treatment indicated  -AC        Rehab Potential good, to achieve stated therapy goals  -AC        Therapy Frequency daily  -AC        Anticipated Discharge Disposition inpatient rehabilitation facility  -AC        Vital Signs    Pre Systolic BP Rehab 117  -  -SR       Pre Treatment Diastolic BP 69  -AC 69  -SR       Post Systolic BP Rehab 113  -  -SR       Post Treatment Diastolic BP 66  -AC 66  -SR       Pretreatment Heart  Rate (beats/min) 62  -AC 62  -SR       Posttreatment Heart Rate (beats/min) 65  -AC 65  -SR       Pre SpO2 (%) 99  -AC 99  -SR       O2 Delivery Pre Treatment room air  -AC room air  -SR       Post SpO2 (%) 99  -AC 99  -SR       O2 Delivery Post Treatment room air  -AC room air  -SR       Pre Patient Position  Supine  -SR       Intra Patient Position  Standing  -SR       Post Patient Position  Sitting  -SR       Pain Assessment    Pain Assessment Cunningham-Queen FACES  -AC Cunningham-Queen FACES  -SR       Cunningham-Queen FACES Pain Rating 2  -AC 2  -SR       Pain Score 3  -AC 3  -SV       Pain Location Generalized  -AC Generalized  -SR       Pain Intervention(s) Repositioned  -AC Repositioned;Ambulation/increased activity  -SR       Response to Interventions tolerated  -AC tolerated  -SR       Vision Assessment/Intervention    Visual Impairment WNL  -AC        Cognitive Assessment/Intervention    Current Cognitive/Communication Assessment impaired  -AC impaired  -SR       Orientation Status oriented to;person   choices for place  -AC oriented to;person   otherwise disoriented  -SR       Follows Commands/Answers Questions 100% of the time;needs cueing;needs repetition  -% of the time;needs cueing;needs increased time;needs repetition  -SR       Personal Safety mild impairment  -AC mild impairment  -SR       Personal Safety Interventions fall prevention program maintained;gait belt;nonskid shoes/slippers when out of bed  -AC fall prevention program maintained;gait belt;nonskid shoes/slippers when out of bed  -SR       ROM (Range of Motion)    General ROM no range of motion deficits identified  -AC no range of motion deficits identified   BLE   -SR       MMT (Manual Muscle Testing)    General MMT Assessment upper extremity strength deficits identified  -AC lower extremity strength deficits identified  -SR       General MMT Assessment Detail BUE grossly 3+/5  -AC BLE 3/5, weakness especially in hips  -SR       Bed Mobility,  Assessment/Treatment    Bed Mobility, Assistive Device bed rails;head of bed elevated  -AC bed rails;head of bed elevated;draw sheet  -SR       Bed Mob, Supine to Sit, [place] moderate assist (50% patient effort);2 person assist required  -AC moderate assist (50% patient effort);2 person assist required;verbal cues required  -SR       Bed Mobility, Comment cues for sequencing task  -AC cues for mechanics  -SR       Transfer Assessment/Treatment    Transfers, Sit-Stand [place] verbal cues required;minimum assist (75% patient effort);2 person assist required  -AC minimum assist (75% patient effort);2 person assist required;verbal cues required  -SR       Transfers, Stand-Sit [place] verbal cues required;minimum assist (75% patient effort);2 person assist required  -AC minimum assist (75% patient effort);2 person assist required;verbal cues required  -SR       Transfers, Sit-Stand-Sit, Assist Device rolling walker  -AC rolling walker  -SR       Transfer, Comment Vcs for hand placement  -AC cues needed for hand placement  -SR       Functional Mobility    Functional Mobility- Ind. Level verbal cues required  -AC        Functional Mobility- Device rolling walker  -AC        Functional Mobility-Distance (Feet) 30  -AC        Lower Body Dressing Assessment/Training    LB Dressing Assess/Train, Clothing Type donning:;slipper socks  -        LB Dressing Assess/Train, Position sitting  -AC        LB Dressing Assess/Train, [place] minimum assist (75% patient effort)  -        Motor Skills/Interventions    Additional Documentation Fine Motor Coordination Training (Group)  -        Fine Motor Coordination Training    Detail (Fine Motor Coordination Training) --   pt with UE tremoring   -        Sensory Assessment/Intervention    Light Touch  LLE;RLE  -SR       LLE Light Touch  WNL  -SR       RLE Light Touch  WNL  -SR       General Therapy Interventions    Planned Therapy Interventions ADL  retraining;balance training;bed mobility training;strengthening;transfer training  -AC        Positioning and Restraints    Pre-Treatment Position in bed  -AC in bed  -SR       Post Treatment Position chair  -AC chair  -SR       In Chair reclined;call light within reach;encouraged to call for assist;exit alarm on;with family/caregiver  -AC notified nsg;reclined;sitting;call light within reach;encouraged to call for assist;exit alarm on;with family/caregiver  -SR         User Key  (r) = Recorded By, (t) = Taken By, (c) = Cosigned By    Initials Name Effective Dates    SRAVANTHI Emily Gallardo, PT 06/19/15 -     AC Sabrina Morales, OT 06/23/15 -     DM Yolande Norwood, PT 06/19/15 -     SR Kelsi Tobias, PT 06/19/15 -     SV Latoya Groves, RN 04/19/17 -            Occupational Therapy Education     Title: PT OT SLP Therapies (Active)     Topic: Occupational Therapy (Active)     Point: ADL training (Done)    Description: Instruct learner(s) on proper safety adaptation and remediation techniques during self care or transfers.   Instruct in proper use of assistive devices.    Learning Progress Summary    Learner Readiness Method Response Comment Documented by Status   Patient Acceptance E VU Benefits of activity, role of OT  06/11/17 0939 Done   Family Acceptance E VU Benefits of activity, role of OT  06/11/17 0939 Done                      User Key     Initials Effective Dates Name Provider Type Discipline     06/23/15 -  Sabrina Morales OT Occupational Therapist OT                  OT Recommendation and Plan  Anticipated Discharge Disposition: inpatient rehabilitation facility  Planned Therapy Interventions: ADL retraining, balance training, bed mobility training, strengthening, transfer training  Therapy Frequency: daily  Plan of Care Review  Plan Of Care Reviewed With: patient  Outcome Summary/Follow up Plan: Pt presents with limitations in cognition, strength, balance, ADL performance, and mobility, and will  benefit from OT to advance pt toward increased safety and independence. Pt would benefit from IPOT upon d/c.           OT Goals       06/11/17 0940          Bed Mobility OT LTG    Bed Mobility OT LTG, Date Established 06/11/17  -      Bed Mobility OT LTG, Time to Achieve by discharge  -      Bed Mobility OT LTG, Activity Type supine to sit/sit to supine  -      Bed Mobility OT LTG, [place] Level minimum assist (75% patient effort)  -      Bed Mobility OT LTG, Assist Device bed rails  -      Strength OT LTG    Strength Goal OT LTG, Date Established 06/11/17  -      Strength Goal OT LTG, Time to Achieve by discharge  -      Strength Goal OT LTG, Measure to Achieve Pt will increase BUE strenghth 1 MMG as needed to support ADLs  -      LB Dressing OT LTG    LB Dressing Goal OT LTG, Date Established 06/11/17  -      LB Dressing Goal OT LTG, Time to Achieve by discharge  -      LB Dressing Goal OT LTG, [place] Level contact guard assist  -      Functional Mobility OT LTG    Functional Mobility Goal OT LTG, Date Established 06/11/17  -      Functional Mobility Goal OT LTG, Time to Achieve by discharge  -AC      Functional Mobility Goal OT LTG, [place] Level contact guard  -AC      Functional Mobility Goal OT LTG, Assist Device rolling walker  -      Functional Mobility Goal OT LTG, Distance to Achieve in hallway;to the bathroom  -        User Key  (r) = Recorded By, (t) = Taken By, (c) = Cosigned By    Initials Name Provider Type    LEANNA Morales OT Occupational Therapist                Outcome Measures       06/11/17 0831 06/11/17 0830       How much help from another person do you currently need...    Turning from your back to your side while in flat bed without using bedrails?  4  -SR     Moving from lying on back to sitting on the side of a flat bed without bedrails?  2  -SR     Moving to and from a bed to a chair (including a wheelchair)?  3  -SR     Standing up from a  chair using your arms (e.g., wheelchair, bedside chair)?  3  -SR     Climbing 3-5 steps with a railing?  2  -SR     To walk in hospital room?  3  -SR     AM-PAC 6 Clicks Score  17  -SR     How much help from another is currently needed...    Putting on and taking off regular lower body clothing? 2  -AC      Bathing (including washing, rinsing, and drying) 2  -AC      Toileting (which includes using toilet bed pan or urinal) 3  -AC      Putting on and taking off regular upper body clothing 3  -AC      Taking care of personal grooming (such as brushing teeth) 3  -AC      Eating meals 3  -AC      Score 16  -AC      Functional Assessment    Outcome Measure Options AM-PAC 6 Clicks Daily Activity (OT)  -AC AM-PAC 6 Clicks Basic Mobility (PT)  -SR       User Key  (r) = Recorded By, (t) = Taken By, (c) = Cosigned By    Initials Name Provider Type    AC Sabrina Morales, OT Occupational Therapist    SR Kelsi Tobias, PT Physical Therapist          Time Calculation:   OT Start Time: 0831    Therapy Charges for Today     Code Description Service Date Service Provider Modifiers Qty    [id number]  OT EVAL MOD COMPLEXITY 4 6/11/2017 Sabrina Morales OT GO 1               Sabrina Morales OT  6/11/2017

## 2017-06-11 NOTE — PROGRESS NOTES
[facility]  INPATIENT PROGRESS NOTE    Date of Admission: 6/5/2017  Length of Stay: 6  Primary Care Physician: No Known Provider    Subjective   CC:  Dyspnea  HPI: Slept well last night. Up for breakfast this morning and seen while back in bed resting. He has no complaints. Daughter does voice concern regarding right forearm erythema and swelling. She feels that it isn't getting worse but she hasn't seen much improvement.      Review of Systems   Gen-no fevers, chills  CV-no chest pain, palpitations  Resp-no cough, dyspnea  GI-no N/V/D, abd pain    Objective      Temp:  [96.4 °F (35.8 °C)-97.4 °F (36.3 °C)] 97.4 °F (36.3 °C)  Heart Rate:  [53-94] 58  Resp:  [16-18] 18  BP: (108-131)/(54-77) 108/54     Physical Exam  Constitutional: frail appearing elderly man, resting and in no acute distress  Neck: supple, trachea midline  Respiratory: Clear to auscultation bilaterally without wheezes, rales or rhonchi. Nonlabored respirations   Cardiovascular: RRR, no murmurs, rubs, or gallops, palpable pedal pulses bilaterally  Gastrointestinal: Positive bowel sounds, soft, nontender, nondistended  Musculoskeletal: No bilateral ankle edema, no clubbing or cyanosis to bilateral lower extremities. Waffle boots in place.   Psychiatric: Calm, drowsy but alerts easily, cooperative with exam   Neurologic: Oriented to person only, Action tremor to BUEs tremors  Skin: Right distal UE erythema and edema without fluctuance or discrete mass. Tender to palpation with slightly increased warmth.     Results Review:    I have reviewed the labs, radiology results and diagnostic studies.    Results from last 7 days  Lab Units 06/09/17  0458 06/08/17  0422 06/07/17  0325   WBC 10*3/mm3 7.96 8.27 11.41*   HEMOGLOBIN g/dL 12.2* 12.2* 12.3*   HEMATOCRIT % 37.9* 37.3* 38.7*   PLATELETS 10*3/mm3 162 132* 135*     Results from last 7 days  Lab Units 06/10/17  0406 06/09/17 0458 06/08/17  0422   SODIUM mmol/L  141 138 132   POTASSIUM mmol/L 3.6 3.3* 3.5   CHLORIDE mmol/L 107 105 102   TOTAL CO2 mmol/L 24.0 23.0 23.0   BUN mg/dL 26* 26* 30*   CREATININE mg/dL 1.50* 1.60* 1.60*   GLUCOSE mg/dL 129* 157* 143*   CALCIUM mg/dL 10.2 9.9 9.7     Cultures:  Blood Culture   Date Value Ref Range Status   06/05/2017 Streptococcus, Beta Hemolytic, Group C (A)  Preliminary   06/05/2017 Streptococcus, Beta Hemolytic, Group C (A)  Preliminary     Comment:     SEE CULTURE [id number] FOR SUSCEPTIBILITIES     Urine Culture   Date Value Ref Range Status   06/05/2017 30,000-40,000 CFU/mL Enterococcus species (A)  Preliminary     Echocardiogram 6/6/17  · Left ventricular systolic function is severely decreased. Estimated EF = 15%.  · At least Mild mitral valve regurgitation is present  · Moderate tricuspid valve regurgitation is present.  · Mild aortic valve regurgitation is present.  · Calculated right ventricular systolic pressure from tricuspid regurgitation is 49 mmHg.    I have reviewed the medications.    Assessment/Plan     Principal Problem:    Cardiac arrest with ventricular fibrillation  Active Problems:    Cardiomyopathy    Chronic kidney disease, stage IV (severe)    Atherosclerosis of native coronary artery of native heart    Diabetes mellitus    Gastroesophageal reflux disease    Gout    Hyperlipidemia    Hypertension    Urinary tract infection    Acute on chronic systolic congestive heart failure    Mild dementia    Right bundle branch block    Chronic midline low back pain without sciatica    Lactic acidosis. Resolved    Prolonged QT interval    Acute strep bacteremia   - 3/4 bottles (+) with group C strep.   - Repeat blood cultures on 6/8 with NGTD  - Dr. Valdez of [facility] following, he does suspect a pathogen. Currently treating with IV Rocephin and PO Doxy   - No vegetation on TTE     Acute R forearm and L buttock cellulitis   - Antibiotics per ID, appear to be improving     Acute complicated UTI w/urinary frequency  - Urine  culture (+) for enterococcus  - ID managing antibiotics   - Consider urology follow up at dc for possible functional/anatomical abnormalities     Delirium with underlying dementia  - Delerium improving, patient sleeping more  - Continue low-dose Seroquel and Melatonin QHS     Cardiac arrest with ventricular fibrillation   - Per cardiology. 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     DM II   - 4/6/17 A1c 6.1%, good control.   - Decent control here with SSI only. No changes given mental status and sporadic intake.   FSBS this AM 98.      Systolic heart failure  - Appears to be compensated. Poor EF of 15%  - Cardiology following. 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     CKD 4  -Cr stable    Hx of CABG    Generalized Weakness  -PT/OT consulted, awaiting recommends.   - Will likely need rehab, patient and family are agreeable     CODE STATUS: AND  DVT prophylaxis:  SC Heparin  Discharge Planning: I expect patient to be discharged to rehab when bed available, CM assisting with referral    Eva Spears PA-C   06/11/17   8:41 AM

## 2017-06-12 NOTE — PROGRESS NOTES
Discharge Planning Assessment  [facility]     Patient Name: José Miguel Mackey Jr.  MRN: [id number]  Today's Date: 6/12/2017    Admit Date: 6/5/2017          Discharge Needs Assessment     None            Discharge Plan       06/12/17 0854    Case Management/Social Work Plan    Additional Comments CM has spoken with family who have requested a referral be made to [facility]. This was completed.        Discharge Placement     No information found        Expected Discharge Date and Time     Expected Discharge Date Expected Discharge Time    Aris 10, 2017               Demographic Summary     None            Functional Status     None            Psychosocial     None            Abuse/Neglect     None            Legal     None            Substance Abuse     None            Patient Forms     None          Pushpa Mix RN

## 2017-06-12 NOTE — PROGRESS NOTES
"Adult Nutrition  Assessment/PES    Patient Name:  José Miguel Mackey Jr.  YOB: 1929  MRN: [id number]  Admit Date:  6/5/2017    Assessment Date:  6/12/2017        Reason for Assessment       06/12/17 1142    Reason for Assessment    Reason For Assessment/Visit follow up protocol    Time Spent (min) 20    Diagnosis Diagnosis   Per notes this admission              Nutrition/Diet History       06/12/17 1143    Nutrition/Diet History    Reported/Observed By Patient;Family    Food Habit/Preferences Uses Supplements    Other Pt reports not eating a lot, but feels that his appetite maybe starting to improve. Per pt family he has been drinking almost all of his supplements, at least 2 complete supplements per day. Per pt and pt family the CA has been coming to take meal preferences.             Anthropometrics       06/12/17 1148    Anthropometrics    Height 170.2 cm (67\")    Weight 69.3 kg (152 lb 11.2 oz)    Ideal Body Weight (IBW)    Ideal Body Weight (IBW), Male (kg) 68.1    % Ideal Body Weight 101.93    Body Mass Index (BMI)    BMI (kg/m2) 23.97            Labs/Tests/Procedures/Meds       06/12/17 1149    Labs/Tests/Procedures/Meds    Labs/Tests Review Reviewed                Nutrition Prescription Ordered       06/12/17 1149    Nutrition Prescription PO    Current PO Diet Regular    Fluid Consistency Thin    Supplement Boost Plus   Strawberry    Supplement Frequency 3 times a day    Common Modifiers Cardiac;Consistent Carbohydrate            Evaluation of Received Nutrient/Fluid Intake       06/12/17 1150    PO Evaluation    Number of Meals 8    % PO Intake 36   Per pt family drinks 2-3 boost plus per day.         Problem/Interventions:          Problem 2       06/12/17 1151    Nutrition Diagnoses Problem 2    Problem 2 Inadequate Intake/Infusion    Inadequate Intake Type Oral    Etiology (related to) Medical Diagnosis   Clinical condition    Signs/Symptoms (evidenced by) PO Intake    Percent (%) intake " recorded 36 %   Per pt family pt drinks 2-3 boost plus supplements per day    Over number of meals 8              Intervention Goal       06/12/17 1152    Intervention Goal    General Nutrition support treatment    PO Increase intake            Nutrition Intervention       06/12/17 1152    Nutrition Intervention    RD/Tech Action Advise available snack;Menu provided;Encourage intake;Follow Tx progress;Care plan reviewd              Education/Evaluation       06/12/17 1153    Monitor/Evaluation    Monitor Per protocol;PO intake;Supplement intake            Electronically signed by:  Cady Mendez  06/12/17 1:24 PM

## 2017-06-12 NOTE — THERAPY TREATMENT NOTE
Acute Care - Physical Therapy Treatment Note  [facility]     Patient Name: José Miguel Mackey Jr.  : 1929  MRN: [id number]  Today's Date: 2017  Onset of Illness/Injury or Date of Surgery Date: 17  Date of Referral to PT: 17  Referring Physician: DO Fitz    Admit Date: 2017    Visit Dx:    ICD-10-CM ICD-9-CM   1. Cardiac arrest with ventricular fibrillation I46.9 427.5    I49.01 427.41   2. Acute UTI N39.0 599.0   3. Chronic systolic congestive heart failure I50.22 428.22     428.0   4. Sepsis, due to unspecified organism A41.9 038.9     995.91   5. Do-not-intubate resuscitation status Z66 V49.86   6. Impaired functional mobility, balance, gait, and endurance Z74.09 V49.89   7. Impaired mobility and ADLs Z74.09 799.89     Patient Active Problem List   Diagnosis   • Abdominal pain   • Anemia   • Ascites   • Benign essential hypertension   • Cardiomyopathy   • Chronic kidney disease, stage IV (severe)   • Confusional state   • Constipation   • Atherosclerosis of native coronary artery of native heart   • Dementia   • Depression   • Diabetes mellitus   • Edema   • Gastroesophageal reflux disease   • Gout   • Arthralgia of hip   • Hyperlipidemia   • Hypertension   • Knee pain   • Memory loss   • Osteoarthritis of knee   • Shortness of breath   • Sinus bradycardia   • Swelling of lower extremity   • Tremor   • Urinary tract infection   • Acute on chronic systolic congestive heart failure   • Dyslipidemia   • Mild dementia   • Right bundle branch block   • Weakness of both legs   • Chronic midline low back pain without sciatica   • Ventricular fibrillation   • Lactic acidosis. Resolved   • Prolonged QT interval   • Cardiac arrest with ventricular fibrillation               Adult Rehabilitation Note       17 1441          Rehab Assessment/Intervention    Discipline physical therapist  -      Document Type therapy note (daily note)  -MAYRA      Subjective Information agree to therapy;no  complaints  -MJ      Patient Effort, Rehab Treatment good  -MJ      Symptoms Noted During/After Treatment fatigue  -MJ      Precautions/Limitations fall precautions  -MJ      Recorded by [MJ] Navjot Gifford, PT      Vital Signs    Pre Systolic BP Rehab --   Vitals stable throughout  -MJ      Recorded by [MJ] Navjot Gifford, PT      Pain Assessment    Pain Assessment No/denies pain  -MJ      Recorded by [MJ] Navjot Gifford, PT      Cognitive Assessment/Intervention    Current Cognitive/Communication Assessment impaired  -MJ      Orientation Status oriented to;person   knew name, not birthday  -MJ      Follows Commands/Answers Questions 100% of the time;able to follow single-step instructions;needs cueing;needs repetition  -MJ      Personal Safety mild impairment  -MJ      Recorded by [MJ] Navjot Gifford, PT      Bed Mobility, Assessment/Treatment    Bed Mob, Supine to Sit, [place] not tested   Pt UIC  -MJ      Bed Mob, Sit to Supine, [place] not tested   Pt UIC  -MJ      Recorded by [MJ] Navjot Gifford, HARSHIL      Transfer Assessment/Treatment    Transfers, Sit-Stand [place] contact guard assist;verbal cues required  -MJ      Transfers, Stand-Sit [place] contact guard assist;verbal cues required  -MJ      Transfers, Sit-Stand-Sit, Assist Device rolling walker  -MJ      Transfer, Safety Issues sequencing ability decreased;step length decreased  -MJ      Transfer, Impairments strength decreased  -MJ      Transfer, Comment Verbal cues for correct hand placement  -MJ      Recorded by [MJ] Navjot Gifford, PT      Gait Assessment/Treatment    Gait, [place] Level minimum assist (75% patient effort);verbal cues required  -MJ      Gait, Assistive Device rolling walker  -MJ      Gait, Distance (Feet) 175  -MJ      Gait, Gait Pattern Analysis swing-through gait  -MJ      Gait, Gait Deviations dani decreased;forward flexed posture;step length decreased  -      Gait, Safety Issues step length decreased  -MJ      Gait,  Impairments strength decreased;impaired balance  -MJ      Gait, Comment Pt demo step through gait pattern at slow pace. Verbal cues for upright posture and to stay in middle of RW. Assist to steer RW during turns, otherwise CGA required. Cues for safety. Gait limited by fatigue  -MJ      Recorded by [MJ] Navjot Gifford, PT      Therapy Exercises    Bilateral Lower Extremities --   Pt refused due to fatigue  -MJ      Recorded by [MJ] Navjot Gifford, PT      Positioning and Restraints    Pre-Treatment Position sitting in chair/recliner  -MJ      Post Treatment Position chair  -MJ      In Chair notified nsg;reclined;call light within reach;encouraged to call for assist;with family/caregiver  -MJ      Recorded by [MJ] Navjot Gifford, PT        User Key  (r) = Recorded By, (t) = Taken By, (c) = Cosigned By    Initials Name Effective Dates    MAYRA Gifford, PT 03/14/16 -                 IP PT Goals       06/12/17 1513 06/11/17 0922       Bed Mobility PT LTG    Bed Mobility PT LTG, Date Established  06/11/17  -SR     Bed Mobility PT LTG, Time to Achieve  by discharge  -SR     Bed Mobility PT LTG, Activity Type  all bed mobility  -SR     Bed Mobility PT LTG, [place] Level  independent  -SR     Bed Mobility PT LTG, Date Goal Reviewed 06/12/17  -      Bed Mobility PT LTG, Outcome goal ongoing  -      Transfer Training PT LTG    Transfer Training PT LTG, Date Established  06/11/17  -SR     Transfer Training PT LTG, Time to Achieve  by discharge  -SR     Transfer Training PT LTG, Activity Type  all transfers  -SR     Transfer Training PT LTG, [place] Level  contact guard assist  -SR     Transfer Training PT LTG, Assist Device  walker, rolling  -SR     Transfer Training PT  LTG, Date Goal Reviewed 06/12/17  -      Transfer Training PT LTG, Outcome goal ongoing  -MJ      Gait Training PT LTG    Gait Training Goal PT LTG, Date Established  06/11/17  -SR     Gait Training Goal PT LTG, Time to Achieve  by discharge  -SR      Gait Training Goal PT LTG, [place] Level  contact guard assist  -SR     Gait Training Goal PT LTG, Assist Device  walker, rolling  -SR     Gait Training Goal PT LTG, Distance to Achieve  150  -SR     Gait Training Goal PT LTG, Date Goal Reviewed 06/12/17  -MJ      Gait Training Goal PT LTG, Outcome goal partially met   achieved distance  -MJ        User Key  (r) = Recorded By, (t) = Taken By, (c) = Cosigned By    Initials Name Provider Type    SR Kelsi Tobias, PT Physical Therapist    MAYRA Gifford, PT Physical Therapist          Physical Therapy Education     Title: PT OT SLP Therapies (Active)     Topic: Physical Therapy (Active)     Point: Mobility training (Active)    Learning Progress Summary    Learner Readiness Method Response Comment Documented by Status   Patient Acceptance E,D NR   06/12/17 1512 Active    Acceptance E,D NR   06/11/17 0921 Active   Family Acceptance E,D VU   06/12/17 1513 Done               Point: Body mechanics (Active)    Learning Progress Summary    Learner Readiness Method Response Comment Documented by Status   Patient Acceptance E,D NR   06/12/17 1512 Active    Acceptance E,D NR   06/11/17 0921 Active   Family Acceptance E,D VU   06/12/17 1513 Done               Point: Precautions (Active)    Learning Progress Summary    Learner Readiness Method Response Comment Documented by Status   Patient Acceptance E,D NR   06/12/17 1512 Active    Acceptance E,D NR   06/11/17 0921 Active   Family Acceptance E,D VU   06/12/17 1513 Done                      User Key     Initials Effective Dates Name Provider Type Discipline     06/19/15 -  Kelsi Tobias, PT Physical Therapist PT     03/14/16 -  Navjot Gifford, PT Physical Therapist PT                    PT Recommendation and Plan  Anticipated Discharge Disposition: inpatient rehabilitation facility  PT Frequency: daily, per priority policy  Plan of Care Review  Plan Of Care Reviewed With: patient  Progress:  improving  Outcome Summary/Follow up Plan: Pt increased gait distance to 175 feet with Yoly and required less assist for all functional mobility. Pt still requires cues for sequencing and safety. Will continue to progress mobility as able          Outcome Measures       06/12/17 1441 06/11/17 0831 06/11/17 0830    How much help from another person do you currently need...    Turning from your back to your side while in flat bed without using bedrails? 3  -MJ  4  -SR    Moving from lying on back to sitting on the side of a flat bed without bedrails? 2  -MJ  2  -SR    Moving to and from a bed to a chair (including a wheelchair)? 3  -MJ  3  -SR    Standing up from a chair using your arms (e.g., wheelchair, bedside chair)? 3  -MJ  3  -SR    Climbing 3-5 steps with a railing? 2  -MJ  2  -SR    To walk in hospital room? 3  -MJ  3  -SR    AM-PAC 6 Clicks Score 16  -MJ  17  -SR    How much help from another is currently needed...    Putting on and taking off regular lower body clothing?  2  -AC     Bathing (including washing, rinsing, and drying)  2  -AC     Toileting (which includes using toilet bed pan or urinal)  3  -AC     Putting on and taking off regular upper body clothing  3  -AC     Taking care of personal grooming (such as brushing teeth)  3  -AC     Eating meals  3  -AC     Score  16  -AC     Functional Assessment    Outcome Measure Options AM-PAC 6 Clicks Basic Mobility (PT)  -MJ AM-PAC 6 Clicks Daily Activity (OT)  -AC AM-PAC 6 Clicks Basic Mobility (PT)  -SR      User Key  (r) = Recorded By, (t) = Taken By, (c) = Cosigned By    Initials Name Provider Type    AC Sabrina Morales, OT Occupational Therapist    SR Kelsi Tobias, PT Physical Therapist    MJ Navjot Gifford, PT Physical Therapist           Time Calculation:         PT Charges       06/12/17 1515          Time Calculation    Start Time 1441  -MJ      PT Received On 06/12/17  -      PT Goal Re-Cert Due Date 06/21/17  -      Time Calculation- PT    Total  Timed Code Minutes- PT 18 minute(s)  -MAYRA        User Key  (r) = Recorded By, (t) = Taken By, (c) = Cosigned By    Initials Name Provider Type    MAYRA Gifford PT Physical Therapist          Therapy Charges for Today     Code Description Service Date Service Provider Modifiers Qty    02944600898 HC GAIT TRAINING EA 15 MIN 6/12/2017 Navjot Gifford, PT GP 1          PT G-Codes  Outcome Measure Options: AM-PAC 6 Clicks Basic Mobility (PT)    Navjot Gifford PT  6/12/2017

## 2017-06-12 NOTE — PROGRESS NOTES
[facility] Progress Note    6/5/2017      Antibiotics:  IV Anti-Infectives     Ordered     Dose/Rate Route Frequency Start Stop    06/10/17 0846  ampicillin (PRINCIPEN) capsule 250 mg     Ordering Provider:  Elian Valdez MD    250 mg Oral Every 6 Hours Scheduled 06/10/17 1200      06/10/17 0844  cefTRIAXone (ROCEPHIN) IVPB 1 g     Ordering Provider:  Elian Valdez MD    1 g  over 30 Minutes Intravenous Daily 06/10/17 1000      06/08/17 0856  DAPTOmycin (CUBICIN) 300 mg in sodium chloride 0.9 % 50 mL IVPB     Comments:  Dosed at 3 mg/kg (rounded)   Ordering Provider:  Elian Valdez MD    300 mg  100 mL/hr over 30 Minutes Intravenous Once 06/08/17 0930 06/08/17 1458    06/07/17 0953  doxycycline (VIBRAMYCIN) capsule 100 mg     Elian Valdez MD reviewed the order on 06/10/17 0849.   Ordering Provider:  Elian Valdez MD    100 mg Oral 2 Times Daily With Meals 06/07/17 1800 06/15/17 0847    06/05/17 2208  piperacillin-tazobactam (ZOSYN) 4.5 g/100 mL 0.9% NS IVPB (mbp)     Ordering Provider:  Jv Zuleta MD    4.5 g Intravenous Once 06/05/17 2210 06/05/17 2347          CC:Dyspnea    HPI:  Patient is a 87 y.o. Yr old male with history of advanced cardiomyopathy/prolonged QT with advanced chronic kidney disease in addition to type 2 diabetes and other numerous comorbidity. He is admitted on June 5, 2017 with subjective fever/chills, nausea/vomiting and dyspnea, had V. fib in the emergency room and converted. He was started on amiodarone. He has been found to have strep bacteremia, enterococcal UTI with urinary frequency.        He is not a good historian in general.     6/12/17  Nursing reports general debility and fluctuating confusion associated with poor sleep and has baseline dementia ; nursing says no new pain or problems otherwise    ROS:      6/12/17 per nursing No f/c/s. No n/v/d. No rash. No new ADR to Abx.     Constitutional-- generally fatigued with poor appetite  Heent-- No new vision,  "hearing or throat complaints.  No epistaxis or oral sores.  Denies odynophagia or dysphagia.  No flashers, floaters or eye pain. No odynophagia or dysphagia. No headache, photophobia or neck stiffness.  CV-- No chest pain, palpitation or syncope  Resp-- as above  GI- No nausea, vomiting, or diarrhea.  No hematochezia, melena, or hematemesis. Denies jaundice or chronic liver disease.  -- No dysuria, hematuria, or flank pain.  Denies hesitancy, urgency or flank pain.  Lymph- no swollen lymph nodes in neck/axilla or groin.   Heme- No active bruising or bleeding; no Hx of DVT or PE.  MS-- no swelling or pain in the bones or joints of arms/legs.  No new back pain.  Neuro-- No acute focal weakness or numbness in the arms or legs.  No seizures. Baseline dementia and generally confused    Full 12 point review of systems reviewed and negative otherwise for acute complaints, other than above      PE:   /74 (BP Location: Left arm, Patient Position: Sitting)  Pulse 70  Temp 98.4 °F (36.9 °C) (Oral)   Resp 16  Ht 67\" (170.2 cm)  Wt 152 lb 11.2 oz (69.3 kg)  SpO2 94%  BMI 23.92 kg/m2    GENERAL: seen early, confused/dementia, in no acute distress.   HEENT: Normocephalic, atraumatic.  PERRL. EOMI. No conjunctival injection. No icterus. Oropharynx clear without evidence of thrush or exudate. No evidence of peridontal disease.    NECK: Supple without nuchal rigidity. No mass.  LYMPH: No cervical, axillary or inguinal lymphadenopathy.  HEART: RRR; No murmur, rubs, gallops.   LUNGS: Clear to auscultation bilaterally without wheezing, rales, rhonchi. Normal respiratory effort. Nonlabored. No dullness.  ABDOMEN: Soft, nontender, nondistended. Positive bowel sounds. No rebound or guarding. NO mass or HSM.  EXT:  No cyanosis, clubbing or edema. No cord.  : Genitalia generally unremarkable.  Without Puentes catheter.  MSK: FROM without joint effusions noted arms/legs.    SKIN: Warm and dry without cutaneous eruptions on " Inspection/palpation.    NEURO: Moves all 4's but generally weak    Right forearm vaguely red/warm with no palpable cord. No open wounds or active drainage. Slight tenderness but no discrete mass bulge or fluctuance. No crepitus or bulla.      No peripheral stigmata/phenomena of endocarditis     Left upper buttock with small area of erythema induration warmth and tenderness but faded in intensity and no discrete mass bulge or fluctuance. No crepitus or bulla and no open wound or drainage        Laboratory Data      Results from last 7 days  Lab Units 06/09/17  0458 06/08/17  0422 06/07/17  0325   WBC 10*3/mm3 7.96 8.27 11.41*   HEMOGLOBIN g/dL 12.2* 12.2* 12.3*   HEMATOCRIT % 37.9* 37.3* 38.7*   PLATELETS 10*3/mm3 162 132* 135*       Results from last 7 days  Lab Units 06/10/17  0406   SODIUM mmol/L 141   POTASSIUM mmol/L 3.6   CHLORIDE mmol/L 107   TOTAL CO2 mmol/L 24.0   BUN mg/dL 26*   CREATININE mg/dL 1.50*   GLUCOSE mg/dL 129*   CALCIUM mg/dL 10.2       Results from last 7 days  Lab Units 06/09/17  0458   ALK PHOS U/L 56   BILIRUBIN mg/dL 0.7   ALT (SGPT) U/L 22   AST (SGOT) U/L 22           Results from last 7 days  Lab Units 06/05/17  1956   CRP mg/dL 12.50*       Estimated Creatinine Clearance: 32.4 mL/min (by C-G formula based on Cr of 1.5).      Microbiology:      Radiology:  Imaging Results (last 72 hours)     Procedure Component Value Units Date/Time    XR Chest 1 View [[id number]] Collected:  06/07/17 0934     Updated:  06/07/17 1020    Narrative:       EXAMINATION: XR CHEST 1 VIEW-06/07/2017:      INDICATION: Congestive heart failure. Questionable pneumonia;  I46.9-Cardiac arrest, cause unspecified; I49.01-Ventricular  fibrillation; N39.0-Urinary tract infection, site not specified;  I50.22-Chronic systolic (congestive) heart failure; A41.9-Sepsis,  unspecified organism; Z66-Do not resuscitate.      COMPARISON: 06/05/2017.     FINDINGS: The heart is large but compensated. There is no acute  inflammatory  process. There is no mass or effusion.           Impression:       Cardiomegaly, compensated. There are no acute pulmonary  findings.     D:  06/07/2017  E:  06/07/2017     This report was finalized on 6/7/2017 10:18 AM by Dr. Asa Connelly MD.               Impression:   --Acute strep bacteremia. In 2 sets and I suspect a pathogen. Concerns with respect to source include skin/soft tissue and respiratory tract. Vague chest x-ray changes with possible pneumonia. He does have several areas of soft tissue inflammation including left upper buttock and right arm. No other obvious musculoskeletal focus. Seems to be improving with empiric antibiotics with no stigmata of endocarditis. Transthoracic echocardiogram no vegetation, discussed directly with Dr. Monique.  Follow-up cultures pending.     --Acute complicated UTI with urinary frequency and enterococcus in culture. He likely has some functional/anatomic disturbance and you would need to consider referral to urology if he becomes agreeable as an outpatient. Any option/timing or threshold for further workup of functional/anatomic disturbance would be left to urology and if found and treated, may help minimize risk for relapse. He will consider his options.     --Acute right forearm cellulitis. Improving with supportive measures and antibiotics. Monitor. No evidence for abscess or necrotic focus. Unclear based on patient's history whether this was present at admission or a result of peripheral IV after admission.     --Acute left upper buttock cellulitis. Unclear if this was related to pressure or some other cutaneous trauma. It is improving with no obvious abscess or necrotic focus. Continue supportive measures and antibiotics.     --Chronic kidney disease, stage IV. Monitor to help guide further adjustments and antimicrobials     --Dyspnea. Cough improved and at least CHF but also possibly some component of respiratory tract infection. Empiric antibiotics ongoing. Cough  not productive and no hemoptysis. If he does not steadily improve consideration could be given to repeat chest imaging     --Cardiac arrest with ventricular fibrillation and prolonged QT interval.     --Severe/advanced cardiomyopathy with CHF per cardiology    PLAN:    --IV rocephin/ Oral ampicillin, doxy     --I discussed with family;  ?rehab/SNF given debility      --Discussed with Dr. Byrnes and micro and case mangement     --Partial history per nursing staff, discussed with microbiology      --Highly complex set of issues with high risk for further serious morbidity and other serious sequela with generally guarded long-term prognosis    --If you lose IV access,  Rocephin can be given IM    Elian Valdez MD  6/12/2017

## 2017-06-12 NOTE — PLAN OF CARE
Problem: Patient Care Overview (Adult)  Goal: Plan of Care Review  Outcome: Ongoing (interventions implemented as appropriate)    06/12/17 1513   Coping/Psychosocial Response Interventions   Plan Of Care Reviewed With patient   Patient Care Overview   Progress improving   Outcome Evaluation   Outcome Summary/Follow up Plan Pt increased gait distance to 175 feet with Yoly and required less assist for all functional mobility. Pt still requires cues for sequencing and safety. Will continue to progress mobility as able         Problem: Inpatient Physical Therapy  Goal: Bed Mobility Goal LTG- PT  Outcome: Ongoing (interventions implemented as appropriate)    06/11/17 0922 06/12/17 1513   Bed Mobility PT LTG   Bed Mobility PT LTG, Date Established 06/11/17 --    Bed Mobility PT LTG, Time to Achieve by discharge --    Bed Mobility PT LTG, Activity Type all bed mobility --    Bed Mobility PT LTG, [place] Level independent --    Bed Mobility PT LTG, Date Goal Reviewed --  06/12/17   Bed Mobility PT LTG, Outcome --  goal ongoing       Goal: Transfer Training Goal 1 LTG- PT  Outcome: Ongoing (interventions implemented as appropriate)    06/11/17 0922 06/12/17 1513   Transfer Training PT LTG   Transfer Training PT LTG, Date Established 06/11/17 --    Transfer Training PT LTG, Time to Achieve by discharge --    Transfer Training PT LTG, Activity Type all transfers --    Transfer Training PT LTG, [place] Level contact guard assist --    Transfer Training PT LTG, Assist Device walker, rolling --    Transfer Training PT LTG, Date Goal Reviewed --  06/12/17   Transfer Training PT LTG, Outcome --  goal ongoing       Goal: Gait Training Goal LTG- PT  Outcome: Ongoing (interventions implemented as appropriate)    06/11/17 0922 06/12/17 1513   Gait Training PT LTG   Gait Training Goal PT LTG, Date Established 06/11/17 --    Gait Training Goal PT LTG, Time to Achieve by discharge --    Gait Training Goal PT LTG, [place]  Level contact guard assist --    Gait Training Goal PT LTG, Assist Device walker, rolling --    Gait Training Goal PT LTG, Distance to Achieve 150 --    Gait Training Goal PT LTG, Date Goal Reviewed --  06/12/17   Gait Training Goal PT LTG, Outcome --  goal partially met  (achieved distance)

## 2017-06-12 NOTE — PLAN OF CARE
Problem: Patient Care Overview (Adult)  Goal: Plan of Care Review  Outcome: Ongoing (interventions implemented as appropriate)    06/12/17 1440   Coping/Psychosocial Response Interventions   Plan Of Care Reviewed With patient;family   Patient Care Overview   Progress improving   Outcome Evaluation   Outcome Summary/Follow up Plan Patient consult for pressure ulcer (present on admission) on midline coccyx-area determined to be Stage II pressure ulcer. Z guard applied and left open to air. See skin care orders and LDA's. All other skin integrity nursing measures in place and implemented. WOCN will continue to follow.          Problem: Pressure Ulcer (Adult)  Goal: Signs and Symptoms of Listed Potential Problems Will be Absent or Manageable (Pressure Ulcer)  Outcome: Ongoing (interventions implemented as appropriate)    06/12/17 1440   Pressure Ulcer   Problems Assessed (Pressure Ulcer) all   Problems Present (Pressure Ulcer) pain

## 2017-06-12 NOTE — PROGRESS NOTES
Continued Stay Note  [facility]     Patient Name: José Miguel Mackey Jr.  MRN: [id number]  Today's Date: 6/12/2017    Admit Date: 6/5/2017          Discharge Plan       06/12/17 1527    Case Management/Social Work Plan    Additional Comments [facility] reports they can accept pt pending bed availability and medical readiness. CM to follow.              Discharge Codes     None        Expected Discharge Date and Time     Expected Discharge Date Expected Discharge Time    Aris 10, 2017             Pushpa Mix RN

## 2017-06-12 NOTE — PROGRESS NOTES
[facility] Medicine Services  INPATIENT PROGRESS NOTE    Date of Admission: 6/5/2017  Length of Stay: 7  Primary Care Physician: No Known Provider    Subjective   CC:  Dyspnea  HPI: Sitting up in chair, states he is hungry, family state he did not eat much breakfast this morning. Dtr concerned about fathers fluctuating confusion and general debility. Patient is appropriate in conversation this morning with staff and family but only oriented to person/ place. No concerns expressed by patient or his wife. Currently no dyspnea, remains on room air.        Review of Systems   Gen-no fevers, chills  CV-no chest pain, palpitations  Resp-no cough, dyspnea  GI-no N/V/D, abd pain    Objective      Temp:  [98.4 °F (36.9 °C)-98.6 °F (37 °C)] 98.4 °F (36.9 °C)  Heart Rate:  [68-70] 70  Resp:  [16] 16  BP: (124-134)/(74-77) 124/74     Physical Exam  Constitutional: frail appearing elderly man, resting and in no acute distress  Neck: supple, trachea midline  Respiratory: Clear to auscultation bilaterally without wheezes, rales or rhonchi. Nonlabored respirations   Cardiovascular: RRR, no murmurs, rubs, or gallops, palpable pedal pulses bilaterally  Gastrointestinal: Positive bowel sounds, soft, nontender, nondistended  Musculoskeletal: No bilateral ankle edema, no clubbing or cyanosis to bilateral lower extremities. Waffle boots in place.   Psychiatric: Calm,  cooperative with exam   Neurologic: Oriented to person/ place only, Action tremor to BUEs tremors  Skin: Right distal UE erythema and edema without fluctuance or discrete mass. Tender to palpation with minimal warmth.     Results Review:    I have reviewed the labs, radiology results and diagnostic studies.    Results from last 7 days  Lab Units 06/09/17  0458 06/08/17  0422 06/07/17  0325   WBC 10*3/mm3 7.96 8.27 11.41*   HEMOGLOBIN g/dL 12.2* 12.2* 12.3*   HEMATOCRIT % 37.9* 37.3* 38.7*   PLATELETS 10*3/mm3 162 132* 135*       Results from last  7 days  Lab Units 06/10/17  0406 06/09/17  0458 06/08/17  0422   SODIUM mmol/L 141 138 132   POTASSIUM mmol/L 3.6 3.3* 3.5   CHLORIDE mmol/L 107 105 102   TOTAL CO2 mmol/L 24.0 23.0 23.0   BUN mg/dL 26* 26* 30*   CREATININE mg/dL 1.50* 1.60* 1.60*   GLUCOSE mg/dL 129* 157* 143*   CALCIUM mg/dL 10.2 9.9 9.7     Cultures:  Blood Culture   Date Value Ref Range Status   06/05/2017 Streptococcus, Beta Hemolytic, Group C (A)  Preliminary   06/05/2017 Streptococcus, Beta Hemolytic, Group C (A)  Preliminary     Comment:     SEE CULTURE [id number] FOR SUSCEPTIBILITIES     Urine Culture   Date Value Ref Range Status   06/05/2017 30,000-40,000 CFU/mL Enterococcus species (A)  Preliminary     Echocardiogram 6/6/17  · Left ventricular systolic function is severely decreased. Estimated EF = 15%.  · At least Mild mitral valve regurgitation is present  · Moderate tricuspid valve regurgitation is present.  · Mild aortic valve regurgitation is present.  · Calculated right ventricular systolic pressure from tricuspid regurgitation is 49 mmHg.    I have reviewed the medications.    Assessment/Plan     Principal Problem:    Cardiac arrest with ventricular fibrillation  Active Problems:    Cardiomyopathy    Chronic kidney disease, stage IV (severe)    Atherosclerosis of native coronary artery of native heart    Diabetes mellitus    Gastroesophageal reflux disease    Gout    Hyperlipidemia    Hypertension    Urinary tract infection    Acute on chronic systolic congestive heart failure    Mild dementia    Right bundle branch block    Chronic midline low back pain without sciatica    Lactic acidosis. Resolved    Prolonged QT interval    Acute strep bacteremia   - 3/4 bottles (+) with group C strep.   - Repeat blood cultures on 6/8 with NGTD  - Dr. Valdez of [facility] following, he does suspect a pathogen. Currently treating with IV Rocephin and PO Doxy   - No vegetation on TTE     Acute R forearm and L buttock cellulitis   - Antibiotics per ID,  appear to be improving     Acute complicated UTI w/urinary frequency  - Urine culture (+) for enterococcus  - ID managing antibiotics   - Consider urology follow up at dc for possible functional/anatomical abnormalities     Delirium with underlying dementia  - Delerium improving, patient sleeping more  - Continue low-dose Seroquel and Melatonin QHS     Cardiac arrest with ventricular fibrillation   - Per cardiology. 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     DM II   - 4/6/17 A1c 6.1%, good control.   - Decent control here with SSI only. No changes given mental status and sporadic intake.   FSBS this AM higher than usual at 161, cont to monitor    Systolic heart failure  - Appears to be compensated. Poor EF of 15%  - Cardiology following. 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     CKD 4  -Cr stable    Hx of CABG    Generalized Weakness  -PT/OT consulted, recommending IPPT  CM working with family and has applied to [facility], family also interested in [facility]    CODE STATUS: AND  DVT prophylaxis:  SC Heparin  Discharge Planning: I expect patient to be discharged to rehab when bed available, CM assisting with referral    Jazmyne Cramer, SEBASTIAN   06/12/17   2:31 PM

## 2017-06-13 PROBLEM — I49.01 CARDIAC ARREST WITH VENTRICULAR FIBRILLATION (HCC): Status: RESOLVED | Noted: 2017-01-01 | Resolved: 2017-01-01

## 2017-06-13 PROBLEM — E87.20 LACTIC ACIDOSIS: Status: RESOLVED | Noted: 2017-01-01 | Resolved: 2017-01-01

## 2017-06-13 PROBLEM — L89.152 DECUBITUS ULCER OF COCCYGEAL REGION, STAGE 2 (HCC): Chronic | Status: ACTIVE | Noted: 2017-01-01

## 2017-06-13 PROBLEM — I46.9 CARDIAC ARREST WITH VENTRICULAR FIBRILLATION (HCC): Status: RESOLVED | Noted: 2017-01-01 | Resolved: 2017-01-01

## 2017-06-13 NOTE — CONSULTS
No Known Provider  Consulting physician: Mayne, Casie PA-C  Reason for referral : goals of care discussion  Chief Complaint   Patient presents with   • Chest Pain   HPI:   86 yo male with ischemic CM, CKD stage IV, ststolic heart failure, dementia presented to ED on 6/5 per H&P note with not feeling well worsening hot/cold sensation, feeling faint and hurting all over. Patient became unresponsive in ED, V fib with single defibrillation and cpr with ROSC. During the hospital course patient has been managed for sepsis with strep bacteremia, complicated enterococcal UTI, right forearm and left buttock cellulitis, acute on chronic systolic heart failure, ischemic cardiomyopathy with LVEF 15%, stage 2 coccyx decubitus ulcer and delirium with underlying dementia.   Palliative medicine was consulted to further assist with goals of care discussion.  Symptoms:   No family at bedside, unable to obtain. Patient is restless moving arms/legs and turning self in bed. + reaching and putting things together in the air, mumbling.     Advance care planning discussed: not done  Code Status: current status AND, comfort measures  Advance Directive: no written document on medical record, but reported he has a living will  Surrogate decision maker: wife, Danielito Mackey  Past Medical History:   Diagnosis Date   • CHF (congestive heart failure)    • CKD stage 4 due to type 2 diabetes mellitus    • Diabetes mellitus    • Dyslipidemia    • Gout    • History of acute myocardial infarction    • Hypertension    • Mild dementia    • Pelvic fracture     Remote pelvic fracture with chronic hip pain.    • Right bundle branch block      Past Surgical History:   Procedure Laterality Date   • ABDOMINAL SURGERY     • ANKLE SURGERY      Remote ankle surgery.     • CARDIAC SURGERY     • KIDNEY SURGERY         Reviewed current scheduled and prn medications for route, type, dose and frequency.    Current Facility-Administered Medications   Medication Dose  Route Frequency Provider Last Rate Last Dose   • acetaminophen (TYLENOL) tablet 650 mg  650 mg Oral Q6H PRN SEBASTIAN Lea       • amiodarone (PACERONE) tablet 200 mg  200 mg Oral Q24H Suhas Monique MD   200 mg at 06/13/17 0830   • ampicillin (PRINCIPEN) capsule 250 mg  250 mg Oral Q6H Elian Valdez MD   250 mg at 06/13/17 0522   • cefTRIAXone (ROCEPHIN) IVPB 1 g  1 g Intravenous Daily Elian Valdez MD   1 g at 06/13/17 0830   • dextrose (D50W) solution 25 g  25 g Intravenous Q15 Min PRN SEBASTIAN Minaya       • dextrose (GLUTOSE) oral gel 15 g  15 g Oral Q15 Min PRN SEBASTIAN Minaya       • doxycycline (VIBRAMYCIN) capsule 100 mg  100 mg Oral BID With Meals Elian Valdez MD   100 mg at 06/13/17 0830   • glucagon (GLUCAGEN) injection 1 mg  1 mg Subcutaneous Q15 Min PRN SEBASTIAN Minaya       • heparin (porcine) 5000 UNIT/ML injection 5,000 Units  5,000 Units Subcutaneous Q12H Estee Echeverria MD   5,000 Units at 06/13/17 0830   • insulin lispro (humaLOG) injection 0-9 Units  0-9 Units Subcutaneous 4x Daily With Meals & Nightly Asael Bess MD   2 Units at 06/12/17 2158   • melatonin sublingual tablet 5 mg  5 mg Sublingual Nightly Casie M Mayne, PA-C   5 mg at 06/12/17 2156   • metoclopramide (REGLAN) injection 5 mg  5 mg Intravenous Q8H PRN SEBASTIAN Minaya       • oxybutynin XL (DITROPAN-XL) 24 hr tablet 10 mg  10 mg Oral Daily Kranthi Serna MD   10 mg at 06/13/17 0830   • pantoprazole (PROTONIX) EC tablet 40 mg  40 mg Oral Q AM Asael Bess MD   40 mg at 06/13/17 0522   • QUEtiapine (SEROquel) tablet 50 mg  50 mg Oral Nightly Aj De Dios MD       • sertraline (ZOLOFT) tablet 50 mg  50 mg Oral Daily Asael Bess MD   50 mg at 06/13/17 0830   • sodium chloride 0.9 % flush 1-10 mL  1-10 mL Intravenous PRN SEBASTIAN Minaya       • terazosin (HYTRIN) capsule 1 mg  1 mg Oral Nightly SEBASTIAN Minaya   1 mg at 06/12/17 3494       "  •  acetaminophen  •  dextrose  •  dextrose  •  glucagon (human recombinant)  •  metoclopramide  •  sodium chloride  Allergies   Allergen Reactions   • Beta Adrenergic Blockers    • Dipyridamole    • Dobutamine      Family History   Problem Relation Age of Onset   • Heart attack Other    • Heart disease Sister    • Heart attack Brother      Social History     Social History   • Marital status:      Spouse name: N/A   • Number of children: N/A   • Years of education: N/A     Occupational History   • Not on file.     Social History Main Topics   • Smoking status: Former Smoker   • Smokeless tobacco: Current User     Types: Chew   • Alcohol use No   • Drug use: No   • Sexual activity: Defer     Other Topics Concern   • Not on file     Social History Narrative   Patient is a farmer.     Review of Systems - +/- per HPI and symptom review.      PPS: 30%  /78  Pulse 72  Temp 97.8 °F (36.6 °C) (Oral)   Resp 18  Ht 67\" (170.2 cm)  Wt 152 lb 11.2 oz (69.3 kg)  SpO2 94%  BMI 23.92 kg/m2  152 lb 11.2 oz (69.3 kg) Body mass index is 23.92 kg/(m^2).  Intake & Output (last day)       06/12 0701 - 06/13 0700 06/13 0701 - 06/14 0700    P.O. 240     Total Intake(mL/kg) 240 (3.5)     Urine (mL/kg/hr) 325 (0.2)     Total Output 325      Net -85            Unmeasured Urine Occurrence 2 x           Physical Exam:  General Appearance: mild distress, healthy appearance  Head: Normocephalic without obvious abnormality, atraumatic  Eyes: Conjunctivae and sclerae normal, no icterus, JOSUÉ  Throat: No oral lesions, no thrush, oral mucosa moist  Lungs: Clear to auscultation, respirations regular, even and non-labored  Heart: Regular rhythm and normal rate, normal S1  Abdomen: Normal bowel sounds, soft, suprapubic firmness/distention, non-tender  Extremities: Moves all extremities, RUE redness and swelling, no cyanosis, no                    bilateral feet sores or wounds  Pulses: Distal pulses palpable and equal " bilaterally  Skin: Warm and dry  Neurological: Awake,no myoclonus, unable to follow commands or answer direct questions, slight tardive dyskinesia/perioral twitches  Reviewed labs and diagnostic results.  Lab Results   Component Value Date    HGBA1C 6.1 04/06/2017       Results from last 7 days  Lab Units 06/13/17  0414   WBC 10*3/mm3 8.60   HEMOGLOBIN g/dL 12.0*   HEMATOCRIT % 38.2*   PLATELETS 10*3/mm3 229       Results from last 7 days  Lab Units 06/13/17  0414   SODIUM mmol/L 139   POTASSIUM mmol/L 3.8   CHLORIDE mmol/L 106   TOTAL CO2 mmol/L 24.0   BUN mg/dL 26*   CREATININE mg/dL 1.60*   CALCIUM mg/dL 10.2   BILIRUBIN mg/dL 0.6   ALK PHOS U/L 59   ALT (SGPT) U/L 19   AST (SGOT) U/L 20   GLUCOSE mg/dL 132*       Results from last 7 days  Lab Units 06/13/17  0414   SODIUM mmol/L 139   POTASSIUM mmol/L 3.8   CHLORIDE mmol/L 106   TOTAL CO2 mmol/L 24.0   BUN mg/dL 26*   CREATININE mg/dL 1.60*   GLUCOSE mg/dL 132*   CALCIUM mg/dL 10.2     Imaging Results (last 72 hours)     ** No results found for the last 72 hours. **      ECHO 6/6 - Interpretation Summary   · Left ventricular systolic function is severely decreased. Estimated EF = 15%.  · At least Mild mitral valve regurgitation is present  · Moderate tricuspid valve regurgitation is present.  · Mild aortic valve regurgitation is present.  · Calculated right ventricular systolic pressure from tricuspid regurgitation is 49 mmHg.   6/10  QTc interval 566 ms    Impression: 87 y.o. male with non ischemic CM, LVEF 15%, systolic heart failure  Sepsis - strep bacteremia, enterococcal UTI    Plan:   Restlessness/delirium -  Noted to have urinary retention  noted neurology consult and increase with seroquel at bedtime.   With prolonged QTc will hold on any haloperidol.   Recommend phenobarbital with severe agitation and avoid day/night reversal.     Urinary retention - bladder scan, 900mL obtain with catheter.     Plan - no family at bedside.   Wife and children have gone  home, nurse reported that they are worn out. Patient has had diminished po intake with increase delirium.   Spoke to wife over the telephone and she and a niece will be here tomorrow morning.        Carlene Hodgson, APRN  [phone]  06/13/17  2:01 PM      Time: >45 minutes spent reviewing medical and medication records, assessing and examining patient, discussing with patient and nursing staff, answering questions, formulating a plan and documentation of care.

## 2017-06-13 NOTE — PROGRESS NOTES
Continued Stay Note  [facility]     Patient Name: José Miguel Mackey Jr.  MRN: [id number]  Today's Date: 6/13/2017    Admit Date: 6/5/2017          Discharge Plan       06/13/17 1350    Case Management/Social Work Plan    Additional Comments [facility] reports that while they are interested in bringing pt to rehab until he no longer requires a sitter they cannot accept him. CM spoke wtih family who reported understanding. They have requested and received information about [facility] as well. CM to follow.              Discharge Codes     None        Expected Discharge Date and Time     Expected Discharge Date Expected Discharge Time    Aris 10, 2017             Pushpa Mix RN

## 2017-06-13 NOTE — CONSULTS
"Subjective     CC: delirium    History of Present Illness   José Miguel Mackey Jr. is a 87 y.o. male is seen today in consultation for delirium. The history is primarily obtained from review of his current hospital records as well as outpatient notes. He has a history of dementia dating back at least to 2014 when he was seen by Dr. Redd.     He was initially admitted on 6/5/17 by Cardiology with ventricular fibrillation and cardiac arrest. He has been noted to have a delirium for at least the last few days, marked by restlessness and confusion. He denies complaints himself. He has been treated with low dose Seroquel at 25 mg qhs. He is unable to provide any further history himself, and none is further available.       The following portions of the patient's history were reviewed and updated as appropriate: allergies, current medications, past family history, past medical history, past social history, past surgical history and problem list.    I reviewed admission PFSH from his initial admission dated 6/5/17.    Review of Systems   Unable to perform ROS: Mental status change       Objective   General appearance today is normal.   Peripheral pulses were present and symmetric.   The ophthalmoscopic exam today is unremarkable. This discs and posterior elements are unremarkable.    /78  Pulse 72  Temp 97.8 °F (36.6 °C) (Oral)   Resp 18  Ht 67\" (170.2 cm)  Wt 152 lb 11.2 oz (69.3 kg)  SpO2 94%  BMI 23.92 kg/m2    Physical Exam   Neurological: He has normal strength. He has a normal Finger-Nose-Finger Test.   Reflex Scores:       Tricep reflexes are 1+ on the right side and 1+ on the left side.       Bicep reflexes are 1+ on the right side and 1+ on the left side.       Brachioradialis reflexes are 1+ on the right side and 1+ on the left side.       Patellar reflexes are 1+ on the right side and 1+ on the left side.       Achilles reflexes are 1+ on the right side and 1+ on the left side.  Psychiatric: His " speech is normal.        Neurologic Exam     Mental Status   Oriented to person.   Disoriented to place.   Disoriented to time.   Recall of objects at 5 minutes: 0/3. Follows 1 step commands.   Attention: decreased. Concentration: decreased.   Speech: speech is normal   Level of consciousness: alert  Knowledge: poor.   Able to name object. Normal comprehension.     Cranial Nerves   Cranial nerves II through XII intact.     Motor Exam   Muscle bulk: normal  Overall muscle tone: normal    Strength   Strength 5/5 throughout.     Sensory Exam   Light touch normal.     Gait, Coordination, and Reflexes     Gait  Gait: (gait not tested due to delirum/confusion)    Coordination   Finger to nose coordination: normal    Tremor   Intention tremor: present    Reflexes   Right brachioradialis: 1+  Left brachioradialis: 1+  Right biceps: 1+  Left biceps: 1+  Right triceps: 1+  Left triceps: 1+  Right patellar: 1+  Left patellar: 1+  Right achilles: 1+  Left achilles: 1+  Right plantar: normal  Left plantar: normal      Laboratory and radiological testing are notable for a largely unremarkable CBC/CMP. TSH has recently been normal.       Assessment/Plan     José Miguel Mackey suffers from a delirium superimposed on his prior history of dementia. I will try increasing his Seroquel qhs for sleep. I would recommend trying to limit his medications otherwise. He will continue to work with PT and OT. We can consider prn Haldol or Seroquel if necessary, but hope to avoid this if possible. Unfortunately, his prognosis for recovery neurologically is guarded, and he will likely need at least subacute rehab placement.    I'll also complete his neurologic workup with labs and a head CT.    As part of this visit I reviewed prior lab results and reviewed records from the current hospitalization which is incorporated in the HPI. Please see above for details.

## 2017-06-13 NOTE — PLAN OF CARE
Problem: Patient Care Overview (Adult)  Goal: Plan of Care Review  Outcome: Ongoing (interventions implemented as appropriate)    06/13/17 1511   Coping/Psychosocial Response Interventions   Plan Of Care Reviewed With (pt w/ dementia, no family present at time of visit)   Patient Care Overview   Progress no change   Outcome Evaluation   Outcome Summary/Follow up Plan Palliative consulted for GOC. Restlessness, insomnia; reportedly calmed following I/O cath for 900 mL urine. Prn meds adjusted for optimal comfort. Palliative following for ongoing comfort interventions and GOC.

## 2017-06-13 NOTE — PROGRESS NOTES
[facility] Progress Note    2017      Antibiotics:  IV Anti-Infectives     Ordered     Dose/Rate Route Frequency Start Stop    06/10/17 0846  ampicillin (PRINCIPEN) capsule 250 mg     Ordering Provider:  Elian Valdez MD    250 mg Oral Every 6 Hours Scheduled 06/10/17 1200      06/10/17 0844  cefTRIAXone (ROCEPHIN) IVPB 1 g     Ordering Provider:  Elian Valdez MD    1 g  over 30 Minutes Intravenous Daily 06/10/17 1000      17 0856  DAPTOmycin (CUBICIN) 300 mg in sodium chloride 0.9 % 50 mL IVPB     Comments:  Dosed at 3 mg/kg (rounded)   Ordering Provider:  Elian Valdez MD    300 mg  100 mL/hr over 30 Minutes Intravenous Once 17 0930 17 1458    17 0953  doxycycline (VIBRAMYCIN) capsule 100 mg     Elian Valdez MD let the order  on 06/10/17 0849.   Ordering Provider:  Elian Valdez MD    100 mg Oral 2 Times Daily With Meals 17 1800 06/15/17 0847    17 2208  piperacillin-tazobactam (ZOSYN) 4.5 g/100 mL 0.9% NS IVPB (mbp)     Ordering Provider:  Jv Zuleta MD    4.5 g Intravenous Once 17 2210 17 2347          CC:Dyspnea    HPI:  Patient is a 87 y.o. Yr old male with history of advanced cardiomyopathy/prolonged QT with advanced chronic kidney disease in addition to type 2 diabetes and other numerous comorbidity. He is admitted on 2017 with subjective fever/chills, nausea/vomiting and dyspnea, had V. fib in the emergency room and converted. He was started on amiodarone. He has been found to have strep bacteremia, enterococcal UTI with urinary frequency.        He is not a good historian in general.     17  Nursing reports general debility and fluctuating confusion associated with poor sleep and has baseline dementia ; nursing says no new pain or problems otherwise    ROS:      17 per nursing No f/c/s. No n/v/d. No rash. No new ADR to Abx.     Constitutional-- generally fatigued with poor appetite  Heent-- No new vision,  "hearing or throat complaints.  No epistaxis or oral sores.  Denies odynophagia or dysphagia.  No flashers, floaters or eye pain. No odynophagia or dysphagia. No headache, photophobia or neck stiffness.  CV-- No chest pain, palpitation or syncope  Resp-- as above  GI- No nausea, vomiting, or diarrhea.  No hematochezia, melena, or hematemesis. Denies jaundice or chronic liver disease.  -- No dysuria, hematuria, or flank pain.  Denies hesitancy, urgency or flank pain.  Lymph- no swollen lymph nodes in neck/axilla or groin.   Heme- No active bruising or bleeding; no Hx of DVT or PE.  MS-- no swelling or pain in the bones or joints of arms/legs.  No new back pain.  Neuro-- No acute focal weakness or numbness in the arms or legs.  No seizures. Baseline dementia and generally confused    Full 12 point review of systems reviewed and negative otherwise for acute complaints, other than above      PE:   /78  Pulse 72  Temp 97.8 °F (36.6 °C) (Oral)   Resp 18  Ht 67\" (170.2 cm)  Wt 152 lb 11.2 oz (69.3 kg)  SpO2 94%  BMI 23.92 kg/m2    GENERAL: seen early, confused/dementia, in no acute distress.   HEENT: Normocephalic, atraumatic.  PERRL. EOMI. No conjunctival injection. No icterus. Oropharynx clear without evidence of thrush or exudate. No evidence of peridontal disease.    NECK: Supple without nuchal rigidity. No mass.  LYMPH: No cervical, axillary or inguinal lymphadenopathy.  HEART: RRR; No murmur, rubs, gallops.   LUNGS: Clear to auscultation bilaterally without wheezing, rales, rhonchi. Normal respiratory effort. Nonlabored. No dullness.  ABDOMEN: Soft, nontender, nondistended. Positive bowel sounds. No rebound or guarding. NO mass or HSM.  EXT:  No cyanosis, clubbing or edema. No cord.  : Genitalia generally unremarkable.  Without Puentes catheter.  MSK: FROM without joint effusions noted arms/legs.    SKIN: Warm and dry without cutaneous eruptions on Inspection/palpation.    NEURO: Moves all 4's but " generally weak    Right forearm vaguely red/warm with no palpable cord. No open wounds or active drainage. Slight tenderness but no discrete mass bulge or fluctuance. No crepitus or bulla.      No peripheral stigmata/phenomena of endocarditis     Left upper buttock with small area of erythema induration warmth and tenderness but faded in intensity and no discrete mass bulge or fluctuance. No crepitus or bulla and no open wound or drainage        Laboratory Data      Results from last 7 days  Lab Units 06/13/17  0414 06/09/17  0458 06/08/17  0422   WBC 10*3/mm3 8.60 7.96 8.27   HEMOGLOBIN g/dL 12.0* 12.2* 12.2*   HEMATOCRIT % 38.2* 37.9* 37.3*   PLATELETS 10*3/mm3 229 162 132*       Results from last 7 days  Lab Units 06/13/17  0414   SODIUM mmol/L 139   POTASSIUM mmol/L 3.8   CHLORIDE mmol/L 106   TOTAL CO2 mmol/L 24.0   BUN mg/dL 26*   CREATININE mg/dL 1.60*   GLUCOSE mg/dL 132*   CALCIUM mg/dL 10.2       Results from last 7 days  Lab Units 06/13/17  0414   ALK PHOS U/L 59   BILIRUBIN mg/dL 0.6   ALT (SGPT) U/L 19   AST (SGOT) U/L 20               Estimated Creatinine Clearance: 30.4 mL/min (by C-G formula based on Cr of 1.6).      Microbiology:      Radiology:  Imaging Results (last 72 hours)     Procedure Component Value Units Date/Time    XR Chest 1 View [[id number]] Collected:  06/07/17 0934     Updated:  06/07/17 1020    Narrative:       EXAMINATION: XR CHEST 1 VIEW-06/07/2017:      INDICATION: Congestive heart failure. Questionable pneumonia;  I46.9-Cardiac arrest, cause unspecified; I49.01-Ventricular  fibrillation; N39.0-Urinary tract infection, site not specified;  I50.22-Chronic systolic (congestive) heart failure; A41.9-Sepsis,  unspecified organism; Z66-Do not resuscitate.      COMPARISON: 06/05/2017.     FINDINGS: The heart is large but compensated. There is no acute  inflammatory process. There is no mass or effusion.           Impression:       Cardiomegaly, compensated. There are no acute  pulmonary  findings.     D:  06/07/2017  E:  06/07/2017     This report was finalized on 6/7/2017 10:18 AM by Dr. Asa Connelly MD.               Impression:   --Acute strep bacteremia. In 2 sets and I suspect a pathogen. Concerns with respect to source include skin/soft tissue and respiratory tract. Vague chest x-ray changes with possible pneumonia. He does have several areas of soft tissue inflammation including left upper buttock and right arm. No other obvious musculoskeletal focus. Seems to be improving with empiric antibiotics with no stigmata of endocarditis. Transthoracic echocardiogram no vegetation, discussed directly with Dr. Monique.  Follow-up cultures pending.     --Acute complicated UTI with urinary frequency and enterococcus in culture. He likely has some functional/anatomic disturbance and you would need to consider referral to urology if he becomes agreeable as an outpatient. Any option/timing or threshold for further workup of functional/anatomic disturbance would be left to urology and if found and treated, may help minimize risk for relapse. He will consider his options.     --Acute right forearm cellulitis. Improving with supportive measures and antibiotics. Monitor. No evidence for abscess or necrotic focus. Unclear based on patient's history whether this was present at admission or a result of peripheral IV after admission.     --Acute left upper buttock cellulitis. Unclear if this was related to pressure or some other cutaneous trauma. It is improving with no obvious abscess or necrotic focus. Continue supportive measures and antibiotics.     --Chronic kidney disease, stage IV. Monitor to help guide further adjustments and antimicrobials     --Dyspnea. Cough improved and at least CHF but also possibly some component of respiratory tract infection. Empiric antibiotics ongoing. Cough not productive and no hemoptysis. If he does not steadily improve consideration could be given to repeat chest  imaging     --Cardiac arrest with ventricular fibrillation and prolonged QT interval.     --Severe/advanced cardiomyopathy with CHF per cardiology    PLAN:    --IV rocephin/ Oral ampicillin, doxy     --Discussed with Dr. Byrnes and micro and case mangement     --Partial history per nursing staff, discussed with microbiology      --Highly complex set of issues with high risk for further serious morbidity and other serious sequela with generally guarded long-term prognosis    --Likely CHR soon    Elian Valdez MD  6/13/2017

## 2017-06-13 NOTE — SIGNIFICANT NOTE
Palliative Team Meeting Attendance  13:00  CARLTON Morfin RN, JESSICA Jacques, SEBASTIAN Squires, RN, DO JOHANA Schultz, [facility], [facility]   FARIDEH Hodgson, SEBASTIAN Goodson, RN, PN

## 2017-06-13 NOTE — DISCHARGE SUMMARY
[facility] Medicine Services  DISCHARGE SUMMARY       Date of Admission: 6/5/2017  Date of Discharge:  6/19/2017  Primary Care Physician: No Known Provider  Consulting Physician(s)     Provider Relationship    Aj De Dios MD Consulting Physician    Elian Valdez MD Consulting Physician    Noam Jiang,  Consulting Physician          Discharge Diagnoses:  Active Hospital Problems (** Indicates Principal Problem)    Diagnosis Date Noted   • Acute on chronic systolic congestive heart failure [I50.23]      Priority: High     1. CHF:  a. On 03/24/2014, EF less than 20%, moderate MR, mild to moderate TR, RVSP 45-50.    b. Remote angioplasty and MI, greater than 20 years prior, data deficient, historical patient of Dr. Llamas.  c. BNP 5000, most recent 1100 as of April 2014 and 300 as of July 2014.         • Cardiomyopathy [I42.9] 06/16/2016     Priority: High     Impression: 12/02/2014 - sees Dr Monique- doing well  Impression: 04/07/2014 - sees  cardiology next month; Description: ef less than 20%,, elevated right heart pressures     • Atherosclerosis of native coronary artery of native heart [I25.10] 06/16/2016     Priority: High   • Decubitus ulcer of coccygeal region, stage 2 (POA) [L89.152] 06/13/2017     Priority: Medium   • Prolonged QT interval [R94.31] 06/05/2017     Priority: Medium   • Urinary tract infection [N39.0] 06/16/2016     Priority: Medium     Impression: 07/30/2014 - check ua;      • Hypertension [I10] 06/16/2016     Priority: Medium     Impression: 08/31/2015 - bp is good  Impression: 12/02/2014 - bp is good  Impression: 07/30/2014 - bp is good, orthostatic changes- discussed increase po intake and slow change in posture  Impression: 02/20/2014 - bp is good currently;      • Diabetes mellitus [E11.9] 06/16/2016     Priority: Medium     Impression: 08/31/2015 - blood sugar is 169, hgn a1c 6.1%  is up to date with eye exam  no change neuropathy  ur alb neg   Impression: 04/07/2015 - blood sugar is 129, hgn a1c 6.1% (average 135)  is up to date with eye exam  no low sugars  foot care is good  decreased pulses   ckd with neg alb   bp is good  Impression: 12/02/2014 - blood sugar and 90 day average sugar are good   nephropathy stable  neuropathy multifactorial- continue to monitor   is up to date with eye exam  no hypoglycemia  follow  Impression: 07/30/2014 - blood sugar and average sugar are good  has nephropathy, continue to monitor  stopped ace due to low bp and orthostasis  discussed with patient  snack or juice if shaky- concerned about low sugar;      • Chronic kidney disease, stage IV (severe) [N18.4] 06/16/2016     Priority: Medium     Impression: 08/31/2015 - update cmp  Impression: 12/02/2014 - update creatinine  Impression: 07/30/2014 - update cmp  Impression: 04/07/2014 - reviewed un and creatinine  have asked him to increase fluids  Impression: 03/17/2014 - update creat, increase diuretic due to edema;     2. CKD, stage 4:  a. Creatinine 2.0.   b. Creatinine 2.2 as of December 2014.       • Chronic midline low back pain without sciatica [M54.5, G89.29] 04/06/2017     Priority: Low   • Right bundle branch block [I45.10]      Priority: Low     3. Abnormal EKG:  a. Right bundle branch block secondary to CT change, QRS of 490 milliseconds.         • Mild dementia [F03.90]      Priority: Low   • Hyperlipidemia [E78.5] 06/16/2016     Priority: Low     Impression: 08/31/2015 - check flp  Impression: 04/07/2015 - reviewed last lipids with him along with targets for chol with h/o cad  Impression: 12/02/2014 - check flp  Impression: 07/30/2014 - check flp;      • Gout [M10.9] 06/16/2016     Priority: Low   • Gastroesophageal reflux disease [K21.9] 06/16/2016     Priority: Low      Resolved Hospital Problems    Diagnosis Date Noted Date Resolved   • **Cardiac arrest with ventricular fibrillation [I46.9, I49.01] 06/05/2017 06/13/2017     Priority: High   • Lactic  acidosis. Resolved [E87.2] 06/05/2017 06/13/2017       Presenting Problem/History of Present Illness  Ventricular fibrillation [I49.01]  Cardiac arrest with ventricular fibrillation [I46.9, I49.01]     Chief Complaint on Day of Discharge:   F/u delerium   History of Present Illness on Day of Discharge:   Sleeping soundly, dtr at bedside, nsg 1-1 sitter. Pt has done well much more restful.  Family wishes for comfort care. Unable to obtain ROS. 2nd to [facility].   Hospital Course  Patient is a 87 y.o. male chronically ill with several comorbidities including CKD IV, systolic HF with low EF, T2DM and dementia who presented to ED 6/5/17 with generalized pain, presyncopal episode earlier in week, in ED became unresponsive, Telemetry monitor revealed Vfib, CPR intiated, s/p defibrillation 200J with conversion NSF, CPR continued with brief 20beat run VFib with pulse tx with amiodarone. Pt was admitted to ICU under Cardiology and intensivist care.  Pt found to have UTI, strep bacteremia and stroke.     Acute strep bacteremia   - 3/4 bottles (+) with group C strep.   - Repeat blood cultures on 6/8 with NGTD  - Dr. Valdez of [facility] following, he does suspect a pathogen. Currently treating with IV Rocephin (per ID, if loses IV access may given rocephin IM) and PO Doxycycline and ampicillin  - No vegetation on TTE   -[facility] will continue to follow patient upon dc to [facility], Consult Dr Vazquez upon admit to Rehab.     Acute R forearm and L buttock cellulitis   - Antibiotics per ID, appear to be improving      Acute complicated UTI w/urinary frequency  - Urine culture (+) for enterococcus  - ID managing antibiotics      Delirium with underlying dementia  -palliative has been following, will transfer to inpatient hospice today  - Continue low-dose Seroquel and Melatonin QHS    -CT of head from 6/15 --Reveals 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 not present on the exam of 03/05/2014. There is no  acute finding.  Cardiac arrest  with ventricular fibrillation   - Per cardiology. 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      Systolic heart failure  - Appears to be compensated. Poor EF of 15%  - Cardiology following. Amiodarone only secondary to trifascicular block  -Poor prognosis, AND status. Patient to follow up with Cardiology outpt, Dr Monique.     HTN  -BP stable     DM II   - 4/6/17 A1c 6.1%, good control.   - Decent control here with SSI only. No changes given mental status and sporadic intake.   FSBS not requiring SSI dc accucheck     Urinary retention/probable BPH  - Ditropan  -luong now in place for comfort     CKD 4  -Cr stable    Hx of CABG     Generalized Weakness    Pressure Ulcer, Stage II coccyx POA  -WOCN has evaluated, recommends:  Reposition Q 2 hours, waffle mattress and waffle cushion (in use), off-load heels, dry flow pads only-no briefs, clean with foaming cleanser and blue wipes, apply Z guard to Stage II on coccyx and for excoriation/moisture barrier.         Pt will transfer to inpatient hospice under care of Dr Jiang.     Procedures Performed       none  Consults:   Consults     Date and Time Order Name Status Description    6/13/2017 1124 Inpatient Consult to Palliative Care MD Completed     6/13/2017 1124 Inpatient Consult to Neurology      6/8/2017 0731 Inpatient Consult to Infectious Diseases Completed           Pertinent Test Results:  Imaging Results (most recent)     Procedure Component Value Units Date/Time    XR Chest 1 View [[id number]]  (Abnormal) Collected:  06/05/17 1942     Updated:  06/05/17 2035    Narrative:         EXAM:    XR Chest, 1 View    CLINICAL HISTORY:    87 years old, male; Pain; Other: Upper abdomen; Additional info: Upper   abdominal pain triage protocol    TECHNIQUE:    Frontal view of the chest.    COMPARISON:    No relevant prior studies available.    FINDINGS:    Lungs:  Patchy perihilar pulmonary opacities and few air bronchograms,    pulmonary edema versus early infectious infiltrate.    Pleural space:  Unremarkable.  No pneumothorax.    Heart:  Moderate cardiomediastinal enlargement, correlate for cardiomegaly   versus pericardial effusion.    Mediastinum:  Unremarkable.    Bones/joints:  Degenerative changes of the lower cervical spine.  Old healed   right 3rd-8th rib fractures.  No acute fracture.      Impression:       1.  Patchy perihilar pulmonary opacities and few air bronchograms, pulmonary   edema versus early infectious infiltrate.    2.  Moderate cardiomediastinal enlargement, correlate for cardiomegaly versus   pericardial effusion.    THIS DOCUMENT HAS BEEN ELECTRONICALLY SIGNED BY JAISON PRICE MD    XR Chest 1 View [[id number]] Collected:  06/07/17 0934     Updated:  06/07/17 1020    Narrative:       EXAMINATION: XR CHEST 1 VIEW-06/07/2017:      INDICATION: Congestive heart failure. Questionable pneumonia;  I46.9-Cardiac arrest, cause unspecified; I49.01-Ventricular  fibrillation; N39.0-Urinary tract infection, site not specified;  I50.22-Chronic systolic (congestive) heart failure; A41.9-Sepsis,  unspecified organism; Z66-Do not resuscitate.      COMPARISON: 06/05/2017.     FINDINGS: The heart is large but compensated. There is no acute  inflammatory process. There is no mass or effusion.           Impression:       Cardiomegaly, compensated. There are no acute pulmonary  findings.     D:  06/07/2017  E:  06/07/2017     This report was finalized on 6/7/2017 10:18 AM by Dr. Asa Connelly MD.       CT Head Without Contrast [[id number]] Collected:  06/15/17 1630     Updated:  06/15/17 1700    Narrative:       EXAMINATION: CT HEAD WITHOUT CONTRAST-06/15/2017:      INDICATION: Confusion; I46.9-Cardiac arrest, cause unspecified;  I49.01-Ventricular fibrillation; N39.0-Urinary tract infection, site not  specified; I50.22-Chronic systolic (congestive) heart failure;  A41.9-Sepsis, unspecified organism; Z66-Do not resuscitate;  Z74.09-Other  reduced mobility; Z74.09-Other reduced mobility.         TECHNIQUE: CT scan of the head was performed at 5 mm intervals. No  intravenous contrast was utilized.     The radiation dose reduction device was turned on for each scan per the  ALARA (As Low as Reasonably Achievable) protocol.     COMPARISON: 03/05/2014.     FINDINGS: There is no intracranial mass. There is no hemorrhage. There  is no midline shift or extra-axial fluid collection. There is a small  area of low density in the right parietal subcortical region consistent  with a small old infarct. There is a second even smaller area of low  density in the left parasagittal subcortical region consistent with an  old infarct. There are no acute findings.       Impression:       There are 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 These were not present on the exam of 03/05/2014. There is no  acute finding.     D:  06/15/2017  E:  06/15/2017     This report was finalized on 6/15/2017 4:58 PM by Dr. Asa Connelly MD.             Comprehensive Metabolic Panel [[id number]] (Abnormal) Collected: 06/16/17 1132       Lab Status: Final result Specimen: Blood Updated: 06/16/17 1202        Glucose 122 (H) mg/dL         BUN 21 mg/dL         Creatinine 1.50 (H) mg/dL         Sodium 140 mmol/L         Potassium 4.5 mmol/L         Chloride 109 mmol/L         CO2 22.0 mmol/L         Calcium 10.1 mg/dL         Total Protein 6.0 g/dL         Albumin 3.30 g/dL         ALT (SGPT) 23 U/L         AST (SGOT) 32 U/L         Alkaline Phosphatase 78 U/L         Total Bilirubin 0.7 mg/dL         eGFR Non African Amer 44 (L) mL/min/1.73         Globulin 2.7 gm/dL         A/G Ratio 1.2 (L) g/dL         BUN/Creatinine Ratio 14.0        Anion Gap 9.0 mmol/L        Narrative:         National Kid[employer] Guidelines    Stage     Description        GFR  1         Normal or High     90+  2         Mild decrease      60-89  3         Moderate decrease  30-59  4        "  Severe decrease    15-29  5         Kidney failure     <15       CBC Auto Differential [[id number]] (Abnormal) Collected: 06/16/17 1132       Lab Status: Final result Specimen: Blood Updated: 06/16/17 1143        WBC 7.52 10*3/mm3         RBC 4.20 10*6/mm3         Hemoglobin 13.3 g/dL         Hematocrit 43.4 %         .3 (H) fL         MCH 31.7 (H) pg         MCHC 30.6 (L) g/dL         RDW 14.3 %         RDW-SD 54.0 fl         MPV 10.0 fL         Platelets 203 10*3/mm3         Neutrophil % 78.1 (H) %         Lymphocyte % 9.0 (L) %         Monocyte % 10.8 %         Eosinophil % 1.3 %         Basophil % 0.5 %         Immature Grans % 0.3 %         Neutrophils, Absolute 5.87 10*3/mm3         Lymphocytes, Absolute 0.68 10*3/mm3         Monocytes, Absolute 0.81 10*3/mm3         Eosinophils, Absolute 0.10 10*3/mm3         Basophils, Absolute 0.04 10*3/mm3         Immature Grans, Absolute 0.02 10*3/mm3         Condition on Discharge:  Poor prognosis    Physical Exam on Discharge:/72 (BP Location: Left arm, Patient Position: Lying)  Pulse 54  Temp 97.4 °F (36.3 °C) (Axillary)   Resp 18  Ht 67\" (170.2 cm)  Wt 146 lb 3.2 oz (66.3 kg)  SpO2 99%  BMI 22.9 kg/m2  Physical Exam  Temp:  [97.1 °F (36.2 °C)-97.4 °F (36.3 °C)] 97.4 °F (36.3 °C)  Heart Rate:  [54-78] 54  Resp:  [18-22] 18  BP: (116-134)/(69-72) 116/72  Constitutional:looks comfortable, appear sin  no acute distress, sleeping  Neck: supple, no thyromegaly, trachea midline  Respiratory: Clear to auscultation bilaterally, nonlabored respirations   Cardiovascular: bradycardic  Gastrointestinal: Positive bowel sounds, soft, nontender, nondistended  : luong in place  Psych: calm, resting  Neurologic: unresponsive  Skin warm and dry  Discharge Disposition      Discharge Medications   José Miguel Mackey Jr.   Home Medication Instructions ONUR:[id number]    Printed on:06/19/17 1531   Medication Information                      acetaminophen (TYLENOL) 500 " MG tablet  Take 500 mg by mouth Every 6 (Six) Hours As Needed for Mild Pain (1-3).             amiodarone (PACERONE) 200 MG tablet  TAKE ONE TABLET BY MOUTH DAILY             aspirin (ASPIRIN LOW DOSE) 81 MG tablet  Take  by mouth daily.             Cholecalciferol (VITAMIN D-3) 1000 UNITS capsule  Take  by mouth.             citalopram (CeleXA) 40 MG tablet  TAKE ONE-HALF TABLET BY MOUTH DAILY             docusate calcium (SURFAK) 240 MG capsule  Take 240 mg by mouth Daily.             furosemide (LASIX) 20 MG tablet  Take  by mouth daily.             glucose blood (ONE TOUCH ULTRA TEST) test strip  daily.             Multiple Vitamin tablet  Take  by mouth daily.             omeprazole (PRILOSEC) 20 MG capsule  Take 1 capsule by mouth.             potassium chloride (MICRO-K) 8 MEQ CR capsule  TAKE ONE CAPSULE BY MOUTH DAILY             pravastatin (PRAVACHOL) 20 MG tablet  Take  by mouth.             rivastigmine (EXELON) 1.5 MG capsule  Take 1.5 mg by mouth 2 (Two) Times a Day.             spironolactone (ALDACTONE) 25 MG tablet  Take  by mouth daily.                 Discharge Diet:     Discharge Care Plan / Instructions:    Activity at Discharge:     Follow-up Appointments  Future Appointments  Date Time Provider Department Center   8/11/2017 10:30 AM Suhas Monique MD [facility] None   8/14/2017 10:00 AM Jessica Duong MD [facility] None         Test Results Pending at Discharge       Casie M Mayne, PA-C 06/19/17 3:31 PM    Time: Discharge 20 min    Please note that portions of this note may have been completed with a voice recognition program. Efforts were made to edit the dictations, but occasionally words are mistranscribed.

## 2017-06-13 NOTE — PROGRESS NOTES
[facility]  INPATIENT PROGRESS NOTE    Date of Admission: 6/5/2017  Length of Stay: 8  Primary Care Physician: No Known Provider    Subjective   CC:  Dyspnea  HPI:     Pt lying in bed, nsg at bedside reports pt has been very restless o/n and this morning. Trying to get OOB, confused, agitated. Family at beside reports very little sleep since Sunday. Did well on Saturday, has become more agitated and active since Sunday. Pt is oriented to person, knows wife, otherwise confused.      Review of Systems   Unable to obtain ROS 2nd to AMS    Objective      Temp:  [97.8 °F (36.6 °C)-98.3 °F (36.8 °C)] 97.8 °F (36.6 °C)  Heart Rate:  [72] 72  Resp:  [16-18] 18  BP: (123-132)/(78-82) 132/78     Physical Exam  Constitutional: frail appearing elderly man,lying in bed in no acute cardio/pulm distress  Neck: supple, trachea midline  Respiratory: Clear to auscultation bilaterally without wheezes, rales or rhonchi. Nonlabored respirations   Cardiovascular: RRR, no murmurs, rubs, or gallops, palpable pedal pulses bilaterally  Gastrointestinal: Positive bowel sounds, soft, nontender, nondistended  Musculoskeletal: No bilateral ankle edema, no clubbing or cyanosis to bilateral lower extremities.   Psychiatric: confused, restless, agitated at times   Neurologic: Oriented to person only, worsening of BUEs tremors, unable to follow commands  Skin: Right distal UE erythema and edema without fluctuance or discrete mass.     Results Review:    I have reviewed the labs, radiology results and diagnostic studies.    Results from last 7 days  Lab Units 06/13/17 0414 06/09/17 0458 06/08/17  0422   WBC 10*3/mm3 8.60 7.96 8.27   HEMOGLOBIN g/dL 12.0* 12.2* 12.2*   HEMATOCRIT % 38.2* 37.9* 37.3*   PLATELETS 10*3/mm3 229 162 132*       Results from last 7 days  Lab Units 06/13/17 0414 06/10/17  0406 06/09/17  0458   SODIUM mmol/L 139 141 138   POTASSIUM mmol/L 3.8 3.6 3.3*   CHLORIDE mmol/L 106 107  105   TOTAL CO2 mmol/L 24.0 24.0 23.0   BUN mg/dL 26* 26* 26*   CREATININE mg/dL 1.60* 1.50* 1.60*   GLUCOSE mg/dL 132* 129* 157*   CALCIUM mg/dL 10.2 10.2 9.9      132 (H) 174 (H)R 197 (H)R 127R 161 (H)       Cultures:  Blood Culture   Date Value Ref Range Status   06/05/2017 Streptococcus, Beta Hemolytic, Group C (A)  Preliminary   06/05/2017 Streptococcus, Beta Hemolytic, Group C (A)  Preliminary     Comment:     SEE CULTURE [id number] FOR SUSCEPTIBILITIES     Urine Culture   Date Value Ref Range Status   06/05/2017 30,000-40,000 CFU/mL Enterococcus species (A)  Preliminary     Echocardiogram 6/6/17  · Left ventricular systolic function is severely decreased. Estimated EF = 15%.  · At least Mild mitral valve regurgitation is present  · Moderate tricuspid valve regurgitation is present.  · Mild aortic valve regurgitation is present.  · Calculated right ventricular systolic pressure from tricuspid regurgitation is 49 mmHg.    I have reviewed the medications.    Assessment/Plan     Active Problems:    Cardiomyopathy    Atherosclerosis of native coronary artery of native heart    Acute on chronic systolic congestive heart failure    Chronic kidney disease, stage IV (severe)    Diabetes mellitus    Hypertension    Urinary tract infection    Prolonged QT interval    Decubitus ulcer of coccygeal region, stage 2 (POA)    Gastroesophageal reflux disease    Gout    Hyperlipidemia    Mild dementia    Right bundle branch block    Chronic midline low back pain without sciatica    Acute strep bacteremia   - 3/4 bottles (+) with group C strep.   - Repeat blood cultures on 6/8 with NGTD  - Dr. Valdez of [facility] following, he does suspect a pathogen. Currently treating with IV Rocephin and PO Doxy   - No vegetation on TTE     Acute R forearm and L buttock cellulitis   - Antibiotics per ID, appear to be improving     Acute complicated UTI w/urinary frequency  - Urine culture (+) for enterococcus  - ID managing antibiotics   - Consider  urology follow up at [place] for possible functional/anatomical abnormalities     Delirium with underlying dementia  - Delerium improving, patient sleeping more  - Continue low-dose Seroquel, restoril and Melatonin QHS  - Consulting Neurology for recommendations, sleep deprivation likely worsening sx, contributing to delirium    Cardiac arrest with ventricular fibrillation   - Per cardiology. 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     Systolic heart failure  - Appears to be compensated. Poor EF of 15%  - Cardiology following. 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     DM II   - 4/6/17 A1c 6.1%, good control.   - Decent control here with SSI only. No changes given mental status and sporadic intake.   -BG up likely r/t stress, continue current SSi    Urinary retention/probable BPH  - Ditropan  - Consider urology follow up at discharge.     CKD 4  -Cr stable    Hx of CABG    Generalized Weakness  -PT/OT consulted, recommending IPPT  CM working with family and has applied to [facility], family also interested in [facility]    CODE STATUS: AND  DVT prophylaxis:  SC Heparin  Discharge Planning:plan was to go to [facility] today, had been accepted and bed available, dc cancelled 2nd to worsening delirium/requiring sitter. Anticipate dc 1-2 days once improving.     Total time spent face to face with the patient/spouse/daughter was 30, 20 minutes were spent counseling the family regarding goals of care moving forward, consultation with neurology for rec r/t delirium with hx dementia, benefits of palliative care services moving forward given goals of care.     Casie M Mayne, PA-C   06/13/17   11:25 AM

## 2017-06-14 NOTE — PROGRESS NOTES
Continued Stay Note  [facility]     Patient Name: José Miguel Mackey Jr.  MRN: [id number]  Today's Date: 6/14/2017    Admit Date: 6/5/2017          Discharge Plan       06/14/17 1142    Case Management/Social Work Plan    Additional Comments CM continues to follow for rehab needs. As long as pt requires a sitter he will not be able to be accepted into any SNFs for rehab. [facility] continues to follow for possible admit pending removal of sitter and pt being able to participate in rehab. CM to follow.              Discharge Codes     None        Expected Discharge Date and Time     Expected Discharge Date Expected Discharge Time    Aris 10, 2017             Pushpa Mix RN

## 2017-06-14 NOTE — PROGRESS NOTES
[facility] Progress Note    2017      Antibiotics:  IV Anti-Infectives     Ordered     Dose/Rate Route Frequency Start Stop    06/10/17 0846  ampicillin (PRINCIPEN) capsule 250 mg     Ordering Provider:  Elian Valdez MD    250 mg Oral Every 6 Hours Scheduled 06/10/17 1200      06/10/17 0844  cefTRIAXone (ROCEPHIN) IVPB 1 g     Ordering Provider:  Elian Valdez MD    1 g  over 30 Minutes Intravenous Daily 06/10/17 1000      17 0856  DAPTOmycin (CUBICIN) 300 mg in sodium chloride 0.9 % 50 mL IVPB     Comments:  Dosed at 3 mg/kg (rounded)   Ordering Provider:  Elian Valdez MD    300 mg  100 mL/hr over 30 Minutes Intravenous Once 17 0930 17 1458    17 0953  doxycycline (VIBRAMYCIN) capsule 100 mg     Elian Valdez MD let the order  on 06/10/17 0849.   Ordering Provider:  Elian Valdez MD    100 mg Oral 2 Times Daily With Meals 17 1800 06/15/17 0847    17 2208  piperacillin-tazobactam (ZOSYN) 4.5 g/100 mL 0.9% NS IVPB (mbp)     Ordering Provider:  Jv Zuleta MD    4.5 g Intravenous Once 17 2210 17 2347          CC:Dyspnea    HPI:  Patient is a 87 y.o. Yr old male with history of advanced cardiomyopathy/prolonged QT with advanced chronic kidney disease in addition to type 2 diabetes and other numerous comorbidity. He is admitted on 2017 with subjective fever/chills, nausea/vomiting and dyspnea, had V. fib in the emergency room and converted. He was started on amiodarone. He has been found to have strep bacteremia, enterococcal UTI with urinary frequency.        He is not a good historian in general.     17  Nursing reports general debility and fluctuating confusion associated with poor sleep and has baseline dementia ; nursing says no new pain or problems otherwise    ROS:      17 per nursing No f/c/s. No n/v/d. No rash. No new ADR to Abx.     Constitutional-- generally fatigued with poor appetite  Heent-- No new vision,  "hearing or throat complaints.  No epistaxis or oral sores.  Denies odynophagia or dysphagia.  No flashers, floaters or eye pain. No odynophagia or dysphagia. No headache, photophobia or neck stiffness.  CV-- No chest pain, palpitation or syncope  Resp-- as above  GI- No nausea, vomiting, or diarrhea.  No hematochezia, melena, or hematemesis. Denies jaundice or chronic liver disease.  -- No dysuria, hematuria, or flank pain.  Denies hesitancy, urgency or flank pain.  Lymph- no swollen lymph nodes in neck/axilla or groin.   Heme- No active bruising or bleeding; no Hx of DVT or PE.  MS-- no swelling or pain in the bones or joints of arms/legs.  No new back pain.  Neuro-- No acute focal weakness or numbness in the arms or legs.  No seizures. Baseline dementia and generally confused    Full 12 point review of systems reviewed and negative otherwise for acute complaints, other than above      PE:   /62 (BP Location: Right arm, Patient Position: Lying)  Pulse 71  Temp 98.6 °F (37 °C) (Axillary)   Resp 17  Ht 67\" (170.2 cm)  Wt 152 lb 11.2 oz (69.3 kg)  SpO2 96%  BMI 23.92 kg/m2    GENERAL: seen early, confused/dementia, in no acute distress.   HEENT: Normocephalic, atraumatic.  PERRL. EOMI. No conjunctival injection. No icterus. Oropharynx clear without evidence of thrush or exudate. No evidence of peridontal disease.    NECK: Supple without nuchal rigidity. No mass.  LYMPH: No cervical, axillary or inguinal lymphadenopathy.  HEART: RRR; No murmur, rubs, gallops.   LUNGS: Clear to auscultation bilaterally without wheezing, rales, rhonchi. Normal respiratory effort. Nonlabored. No dullness.  ABDOMEN: Soft, nontender, nondistended. Positive bowel sounds. No rebound or guarding. NO mass or HSM.  EXT:  No cyanosis, clubbing or edema. No cord.  : Genitalia generally unremarkable.  Without Puentes catheter.  MSK: FROM without joint effusions noted arms/legs.    SKIN: Warm and dry without cutaneous eruptions on " Inspection/palpation.    NEURO: Moves all 4's but generally weak    Right forearm vaguely red/warm with no palpable cord. No open wounds or active drainage. Slight tenderness but no discrete mass bulge or fluctuance. No crepitus or bulla.      No peripheral stigmata/phenomena of endocarditis     Left upper buttock with small area of erythema induration warmth and tenderness but faded in intensity and no discrete mass bulge or fluctuance. No crepitus or bulla and no open wound or drainage        Laboratory Data      Results from last 7 days  Lab Units 06/13/17  0414 06/09/17  0458 06/08/17  0422   WBC 10*3/mm3 8.60 7.96 8.27   HEMOGLOBIN g/dL 12.0* 12.2* 12.2*   HEMATOCRIT % 38.2* 37.9* 37.3*   PLATELETS 10*3/mm3 229 162 132*       Results from last 7 days  Lab Units 06/13/17  0414   SODIUM mmol/L 139   POTASSIUM mmol/L 3.8   CHLORIDE mmol/L 106   TOTAL CO2 mmol/L 24.0   BUN mg/dL 26*   CREATININE mg/dL 1.60*   GLUCOSE mg/dL 132*   CALCIUM mg/dL 10.2       Results from last 7 days  Lab Units 06/13/17  0414   ALK PHOS U/L 59   BILIRUBIN mg/dL 0.6   ALT (SGPT) U/L 19   AST (SGOT) U/L 20               Estimated Creatinine Clearance: 30.4 mL/min (by C-G formula based on Cr of 1.6).      Microbiology:      Radiology:  Imaging Results (last 72 hours)     Procedure Component Value Units Date/Time    XR Chest 1 View [[id number]] Collected:  06/07/17 0934     Updated:  06/07/17 1020    Narrative:       EXAMINATION: XR CHEST 1 VIEW-06/07/2017:      INDICATION: Congestive heart failure. Questionable pneumonia;  I46.9-Cardiac arrest, cause unspecified; I49.01-Ventricular  fibrillation; N39.0-Urinary tract infection, site not specified;  I50.22-Chronic systolic (congestive) heart failure; A41.9-Sepsis,  unspecified organism; Z66-Do not resuscitate.      COMPARISON: 06/05/2017.     FINDINGS: The heart is large but compensated. There is no acute  inflammatory process. There is no mass or effusion.           Impression:        Cardiomegaly, compensated. There are no acute pulmonary  findings.     D:  06/07/2017  E:  06/07/2017     This report was finalized on 6/7/2017 10:18 AM by Dr. Asa Connelly MD.               Impression:   --Acute strep bacteremia. In 2 sets and I suspect a pathogen. Concerns with respect to source include skin/soft tissue and respiratory tract. Vague chest x-ray changes with possible pneumonia. He does have several areas of soft tissue inflammation including left upper buttock and right arm. No other obvious musculoskeletal focus. Seems to be improving with empiric antibiotics with no stigmata of endocarditis. Transthoracic echocardiogram no vegetation, discussed directly with Dr. Monique.  Follow-up cultures pending.     --Acute complicated UTI with urinary frequency and enterococcus in culture. He likely has some functional/anatomic disturbance and you would need to consider referral to urology if he becomes agreeable as an outpatient. Any option/timing or threshold for further workup of functional/anatomic disturbance would be left to urology and if found and treated, may help minimize risk for relapse. He will consider his options.     --Acute right forearm cellulitis. Improving with supportive measures and antibiotics. Monitor. No evidence for abscess or necrotic focus. Unclear based on patient's history whether this was present at admission or a result of peripheral IV after admission.     --Acute left upper buttock cellulitis. Unclear if this was related to pressure or some other cutaneous trauma. It is improving with no obvious abscess or necrotic focus. Continue supportive measures and antibiotics.     --Chronic kidney disease, stage IV. Monitor to help guide further adjustments and antimicrobials     --Dyspnea. Cough improved and at least CHF but also possibly some component of respiratory tract infection. Empiric antibiotics ongoing. Cough not productive and no hemoptysis. If he does not steadily improve  consideration could be given to repeat chest imaging     --Cardiac arrest with ventricular fibrillation and prolonged QT interval.     --Severe/advanced cardiomyopathy with CHF per cardiology    PLAN:    --IV rocephin/ Oral ampicillin, doxy     --Discussed with Dr. Byrnes and micro and case mangement     --Partial history per nursing staff, discussed with microbiology      --Highly complex set of issues with high risk for further serious morbidity and other serious sequela with generally guarded long-term prognosis    --Likely [facility] soon per case management    Elian Valdez MD  6/14/2017

## 2017-06-14 NOTE — SIGNIFICANT NOTE
1300 Palliative Team Meeting  Dr. Noam Hodgson, APRN  Nohelia Jacques, APRN  Gracie Squires, RN, CPCOREY [facility], [facility]  Sindy Goodson, Hospice RN, CM

## 2017-06-14 NOTE — PROGRESS NOTES
"José Miguel Mackey Jr.    Subjective     CC: delirium     History of Present Illness     José Miguel Mackey Jr. is followed for delirium. He is quite drowsy this morning and is unable to follow commands or answer questions appropriately. Per nursing, he has not had any agitation for the last several hours.     There have been no other changes in the patient's interval history since Dr. De Dios's consult note yesterday, 6/13/17.      The following portions of the patient's history were reviewed and updated as appropriate: allergies, current medications, past family history, past medical history, past social history, past surgical history and problem list.    Review of Systems   Unable to perform ROS: Other       Objective     /71  Pulse 80  Temp 98.1 °F (36.7 °C) (Temporal Artery )   Resp 18  Ht 67\" (170.2 cm)  Wt 152 lb 11.2 oz (69.3 kg)  SpO2 95%  BMI 23.92 kg/m2    Physical Exam   Eyes: Pupils are equal, round, and reactive to light.        Neurologic Exam     Mental Status   Attention: decreased. Concentration: decreased.   Level of consciousness: arousable by tactile stimuli  Knowledge: poor.   Abnormal comprehension.     Cranial Nerves     CN III, IV, VI   Pupils are equal, round, and reactive to light.    Motor Exam   Muscle bulk: normal  Overall muscle tone: normal       Unable to assess strength due to depressed mental status.      Gait, Coordination, and Reflexes     Tremor   Resting tremor: absent      Lab work including B12 is normal. Folate level is pending.       Assessment/Plan       José Miguel Mackey Jr. suffers from a delirium superimposed on his prior history of dementia. We will await the results of his head CT. Otherwise, he will continue on his current medications unchanged for now. Unfortunately, his prognosis for recovery neurologically is guarded, and he will likely need at least subacute rehab placement.     As part of this visit I reviewed prior lab results and reviewed records from " the current hospitalization which is incorporated in the HPI.

## 2017-06-14 NOTE — PLAN OF CARE
Problem: Patient Care Overview (Adult)  Goal: Plan of Care Review  Outcome: Ongoing (interventions implemented as appropriate)    Problem: Pressure Ulcer (Adult)  Goal: Signs and Symptoms of Listed Potential Problems Will be Absent or Manageable (Pressure Ulcer)  Outcome: Ongoing (interventions implemented as appropriate)    06/14/17 1420   Pressure Ulcer   Problems Assessed (Pressure Ulcer) all   Problems Present (Pressure Ulcer) none

## 2017-06-14 NOTE — PROGRESS NOTES
[facility] Medicine Services  INPATIENT PROGRESS NOTE    Date of Admission: 6/5/2017  Length of Stay: 9  Primary Care Physician: No Known Provider    Subjective   CC:  Dyspnea  Subjective:    Resting in bed tells me he is comfortable.  He is a bit drowsy but otherwise no specific complaint.  No chest pain or shortness of breath.  No nausea vomiting or diarrhea.     Review of Systems   Unable to obtain ROS 2nd to AMS    Objective      Temp:  [98.1 °F (36.7 °C)-98.6 °F (37 °C)] 98.6 °F (37 °C)  Heart Rate:  [57-80] 63  Resp:  [17-18] 17  BP: (133-139)/(62-71) 133/62     Physical Exam  Constitutional: frail appearing elderly man,lying in bed in no acute cardio/pulm distress  Neck: supple, trachea midline  Respiratory: Clear to auscultation bilaterally without wheezes, rales or rhonchi. Nonlabored respirations   Cardiovascular: RRR, no murmurs, rubs, or gallops, palpable pedal pulses bilaterally  Gastrointestinal: Positive bowel sounds, soft, nontender, nondistended  Musculoskeletal: No bilateral ankle edema, no clubbing or cyanosis to bilateral lower extremities.   Psychiatric: confused, restless, agitated at times   Neurologic: A,A, follows simple commands.  Skin: Right distal UE erythema and edema without fluctuance or discrete mass.     Results Review:    I have reviewed the labs, radiology results and diagnostic studies.    Results from last 7 days  Lab Units 06/13/17 0414 06/09/17 0458 06/08/17  0422   WBC 10*3/mm3 8.60 7.96 8.27   HEMOGLOBIN g/dL 12.0* 12.2* 12.2*   HEMATOCRIT % 38.2* 37.9* 37.3*   PLATELETS 10*3/mm3 229 162 132*       Results from last 7 days  Lab Units 06/13/17 0414 06/10/17  0406 06/09/17 0458   SODIUM mmol/L 139 141 138   POTASSIUM mmol/L 3.8 3.6 3.3*   CHLORIDE mmol/L 106 107 105   TOTAL CO2 mmol/L 24.0 24.0 23.0   BUN mg/dL 26* 26* 26*   CREATININE mg/dL 1.60* 1.50* 1.60*   GLUCOSE mg/dL 132* 129* 157*   CALCIUM mg/dL 10.2 10.2 9.9      132 (H) 174 (H)R 197  (H)R 127R 161 (H)       Cultures:  Blood Culture   Date Value Ref Range Status   06/05/2017 Streptococcus, Beta Hemolytic, Group C (A)  Preliminary   06/05/2017 Streptococcus, Beta Hemolytic, Group C (A)  Preliminary     Comment:     SEE CULTURE [id number] FOR SUSCEPTIBILITIES     Urine Culture   Date Value Ref Range Status   06/05/2017 30,000-40,000 CFU/mL Enterococcus species (A)  Preliminary     Echocardiogram 6/6/17  · Left ventricular systolic function is severely decreased. Estimated EF = 15%.  · At least Mild mitral valve regurgitation is present  · Moderate tricuspid valve regurgitation is present.  · Mild aortic valve regurgitation is present.  · Calculated right ventricular systolic pressure from tricuspid regurgitation is 49 mmHg.    I have reviewed the medications.    Assessment/Plan     Active Problems:    Cardiomyopathy    Chronic kidney disease, stage IV (severe)    Atherosclerosis of native coronary artery of native heart    Diabetes mellitus    Gastroesophageal reflux disease    Gout    Hyperlipidemia    Hypertension    Urinary tract infection    Acute on chronic systolic congestive heart failure    Mild dementia    Right bundle branch block    Chronic midline low back pain without sciatica    Prolonged QT interval    Decubitus ulcer of coccygeal region, stage 2 (POA)    Acute strep bacteremia   - 3/4 bottles (+) with group C strep.   - Repeat blood cultures on 6/8 with NGTD  - Dr. Valdez of [facility] following, he does suspect a pathogen. Currently treating with IV Rocephin and PO Doxy   - No vegetation on TTE     Acute R forearm and L buttock cellulitis   - Antibiotics per ID, appear to be improving     Acute complicated UTI w/urinary frequency  - Urine culture (+) for enterococcus  - ID managing antibiotics   - Consider urology follow up at [place] for possible functional/anatomical abnormalities     Delirium with underlying dementia  - Delerium improving, patient sleeping more  - Continue low-dose  Seroquel, restoril and Melatonin QHS  - Consulting Neurology for recommendations, sleep deprivation likely worsening sx, contributing to delirium    Cardiac arrest with ventricular fibrillation   - Per cardiology. 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     Systolic heart failure  - Appears to be compensated. Poor EF of 15%  - Cardiology following. 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     DM II   - 4/6/17 A1c 6.1%, good control.   - Decent control here with SSI only. No changes given mental status and sporadic intake.   -BG up likely r/t stress, continue current SSi    Urinary retention/probable BPH  - Ditropan  - Consider urology follow up at discharge.     CKD 4  -Cr stable    Hx of CABG    Generalized Weakness  -PT/OT consulted, recommending IPPT  CM working with family and has applied to [facility], family also interested in [facility]    CODE STATUS: AND  DVT prophylaxis:  SC Heparin  Discharge Planning:plan was to go to [facility] today, had been accepted and bed available, dc cancelled 2nd to worsening delirium/requiring sitter. Anticipate dc 1-2 days once improving.     NOTE: Had a long conversation with the family at the bedside about plan of care.  Total time spent was 38 minutes.    Dereck Rosado MD   06/14/17   5:59 PM

## 2017-06-14 NOTE — PLAN OF CARE
Problem: Patient Care Overview (Adult)  Goal: Plan of Care Review  Outcome: Ongoing (interventions implemented as appropriate)    06/14/17 1055   Coping/Psychosocial Response Interventions   Plan Of Care Reviewed With caregiver   Patient Care Overview   Progress no change   Outcome Evaluation   Outcome Summary/Follow up Plan Patient continues to be restless, agitated, nurse is aware,and has medications she can give, CM looking for LTC facilty but can't place as long as he has a sitter

## 2017-06-14 NOTE — PROGRESS NOTES
"Palliative Care Progress Note    Date of Admission: 6/5/2017    Code Status: AND, comfort measures  Advance Directive: no written document on medical record but reported has living will  Surrogate decision maker: wife, Danielito Mackey    Subjective:  Patient able to answer yes, no questions. Denies any distressing symptoms.  Requesting a drink of water.   Noted patient received phenobarb 30mg at bedtime along with increase seroquel dose, but has a repeat phenobarb dose at 60mg around 0200.   Patient is easily awakened.     Reviewed current scheduled and prn medications for route, type, dose, and frequency.     •  acetaminophen  •  bisacodyl  •  dextrose  •  dextrose  •  glucagon (human recombinant)  •  metoclopramide  •  PHENobarbital  •  sodium chloride    Objective:  PPS 30%   /62 (BP Location: Right arm, Patient Position: Lying)  Pulse 69  Temp 98.6 °F (37 °C) (Axillary)   Resp 17  Ht 67\" (170.2 cm)  Wt 152 lb 11.2 oz (69.3 kg)  SpO2 96%  BMI 23.92 kg/m2   Intake & Output (last day)       06/13 0701 - 06/14 0700 06/14 0701 - 06/15 0700    P.O.  550    Total Intake(mL/kg)  550 (7.9)    Urine (mL/kg/hr) 1230 (0.7)     Total Output 1230      Net -1230 +550          Unmeasured Urine Occurrence 1 x     Unmeasured Stool Occurrence 1 x         Lab Results (last 24 hours)     Procedure Component Value Units Date/Time    Vitamin B12 [[id number]]  (Normal) Collected:  06/13/17 0417    Specimen:  Blood Updated:  06/13/17 1512     Vitamin B-12 571 pg/mL     POC Glucose Fingerstick [[id number]]  (Normal) Collected:  06/13/17 1646    Specimen:  Blood Updated:  06/13/17 1648     Glucose 124 mg/dL     Narrative:       Meter: [id number] : [id number] Raissa Samaniego    POC Glucose Fingerstick [[id number]]  (Abnormal) Collected:  06/13/17 2043    Specimen:  Blood Updated:  06/13/17 2046     Glucose 188 (H) mg/dL     Narrative:       Meter: [id number] : [id number] Diana Maurice    POC Glucose Fingerstick [[id number]]  " (Normal) Collected:  06/14/17 0727    Specimen:  Blood Updated:  06/14/17 0742     Glucose 128 mg/dL     Narrative:       Meter: [id number] : [id number] Rox SCHAFER    Folate [[id number]]  (Abnormal) Collected:  06/14/17 0441    Specimen:  Blood Updated:  06/14/17 0923     Folate >24.00 (H) ng/mL     Narrative:         Folate Reference Ranges:    Deficient:            Less than 1.2 ng/mL  Indeterminant:        1.2-3.1 ng/mL  Normal:               3.2-20.0 ng/mL    POC Glucose Fingerstick [[id number]]  (Abnormal) Collected:  06/14/17 1115    Specimen:  Blood Updated:  06/14/17 1128     Glucose 160 (H) mg/dL     Narrative:       Meter: [id number] : [id number] Rox SCHAFER        Imaging Results (last 24 hours)     ** No results found for the last 24 hours. **          Physical Exam:  Gen: NAD, resting in bed  Skin: warm, dry   Eyes: JOSUÉ, conjunctiva clear and moist  HEENT: oropharynx clear, moist  Resp/thorax: even effort, non labored, CTA   CV: RRR   ABD: soft, bowel sounds +, nontender, no lower abdomen firmness  Ext: no edema, no redness   Neuro: awakens to name, follows commands,  equal and strong, able to lift lower ext off bed, no myoclonus       Reviewed labs and diagnostic results.   Lab Results   Component Value Date    HGBA1C 6.1 04/06/2017       Results from last 7 days  Lab Units 06/13/17  0414   WBC 10*3/mm3 8.60   HEMOGLOBIN g/dL 12.0*   HEMATOCRIT % 38.2*   PLATELETS 10*3/mm3 229       Results from last 7 days  Lab Units 06/13/17  0414   SODIUM mmol/L 139   POTASSIUM mmol/L 3.8   CHLORIDE mmol/L 106   TOTAL CO2 mmol/L 24.0   BUN mg/dL 26*   CREATININE mg/dL 1.60*   CALCIUM mg/dL 10.2   BILIRUBIN mg/dL 0.6   ALK PHOS U/L 59   ALT (SGPT) U/L 19   AST (SGOT) U/L 20   GLUCOSE mg/dL 132*       Impression: 87 y.o. 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   Will adjust phenobarbital dose for another dose this pm.     Urinary  retention - continue to monitor output.     Plan - no family at bedside currently.   Family awaiting acceptance at [facility] facility.  Patient would be hospice eligible with diminished heart function.   Palliative medicine will follow up to further assess patient po intake and whether able to participate in therapy services. Ongoing discussion with wife/family.    Carlene Hodgson, APRN  [phone]  06/14/17  2:25 PM

## 2017-06-15 NOTE — PLAN OF CARE
Problem: Patient Care Overview (Adult)  Goal: Plan of Care Review  Outcome: Ongoing (interventions implemented as appropriate)  Goal: Adult Individualization and Mutuality  Outcome: Ongoing (interventions implemented as appropriate)  Goal: Discharge Needs Assessment  Outcome: Ongoing (interventions implemented as appropriate)    Problem: Pressure Ulcer (Adult)  Goal: Signs and Symptoms of Listed Potential Problems Will be Absent or Manageable (Pressure Ulcer)  Outcome: Ongoing (interventions implemented as appropriate)    Problem: Skin Integrity Impairment, Risk/Actual (Adult)  Goal: Identify Related Risk Factors and Signs and Symptoms  Outcome: Ongoing (interventions implemented as appropriate)  Goal: Skin Integrity/Wound Healing  Outcome: Ongoing (interventions implemented as appropriate)

## 2017-06-15 NOTE — PROGRESS NOTES
"José Miguel Mackey Jr.    Subjective     CC: delirium     History of Present Illness     José Miguel Mackey Jr. is followed for delirium. He is alert and interactive this morning. He answers most questions appropriately and follows commands well. Per nursing, he slept well overnight and has not had any agitation this morning.     There have been no other changes in the patient's interval history since my last note yesterday, 6/14/17.      The following portions of the patient's history were reviewed and updated as appropriate: allergies, current medications, past family history, past medical history, past social history, past surgical history and problem list.    Review of Systems   Constitutional: Negative.    HENT: Negative.    Eyes: Negative.    Respiratory: Negative.    Cardiovascular: Negative.    Gastrointestinal: Negative.    Endocrine: Negative.    Genitourinary: Negative.    Musculoskeletal: Negative.    Skin: Negative.    Allergic/Immunologic: Negative.    Hematological: Negative.    Psychiatric/Behavioral: Negative.        Objective     /70 (BP Location: Right arm, Patient Position: Sitting)  Pulse 63  Temp 98.1 °F (36.7 °C) (Oral)   Resp 20  Ht 67\" (170.2 cm)  Wt 152 lb 3.2 oz (69 kg)  SpO2 96%  BMI 23.84 kg/m2    Physical Exam   Neurological: He has normal strength. He has a normal Finger-Nose-Finger Test.   Psychiatric: His speech is normal.        Neurologic Exam     Mental Status   Oriented to person.   (Oriented to state and county. )  Disoriented to time. Disoriented to year, month, date, day and season.   Registration: recalls 3 of 3 objects. Recall of objects at 5 minutes: 0/3 recall. Follows 1 step commands.   Attention: decreased.   Speech: speech is normal   Level of consciousness: alert  Able to name object. Able to repeat. Normal comprehension.     Cranial Nerves   Cranial nerves II through XII intact.     Motor Exam   Muscle bulk: normal  Overall muscle tone: normal    Strength "   Strength 5/5 throughout.     Sensory Exam   Light touch normal.     Gait, Coordination, and Reflexes     Coordination   Finger to nose coordination: normal    Tremor   Resting tremor: absent      Assessment/Plan       José Miguel Mackey Jr. suffers from a delirium superimposed on his prior history of dementia. We will continue to await the results of his head CT. Otherwise, he will continue on his current medications unchanged for now. He will likely need at least subacute rehab placement upon discharge.       As part of this visit I reviewed records from the current hospitalization which is incorporated in the HPI.

## 2017-06-15 NOTE — PROGRESS NOTES
[facility] Medicine Services  INPATIENT PROGRESS NOTE    Date of Admission: 6/5/2017  Length of Stay: 10  Primary Care Physician: No Known Provider    Subjective   CC: confusion  Subjective:  No family present, nsg sitting with pt, confused, restless. Pt unable to answer questions 2nd to confusion. Per nsg family considering hospice d/t results on ct head today       Review of Systems   Unable to obtain ROS 2nd to AMS    Objective      Temp:  [98.1 °F (36.7 °C)-98.4 °F (36.9 °C)] 98.1 °F (36.7 °C)  Heart Rate:  [57-88] 87  Resp:  [16-20] 20  BP: ()/(65-74) 90/65     Physical Exam  Constitutional: frail appearing elderly man, restless, appears in NAD  Neck: supple, trachea midline  Respiratory: Clear to auscultation bilaterally without wheezes, rales or rhonchi. Nonlabored respirations   Cardiovascular:limited exam 2nd to restlessness  Gastrointestinal: Positive bowel sounds, soft, nondistended  Musculoskeletal: No bilateral ankle edema, no clubbing or cyanosis to bilateral lower extremities.   Psychiatric: confused, restless, agitated at times   Neurologic: arouses, agitated, followed a few commands  Skin: Right distal UE erythema and edema without fluctuance or discrete mass.     Results Review:    I have reviewed the labs, radiology results and diagnostic studies.    Results from last 7 days  Lab Units 06/13/17 0414 06/09/17  0458   WBC 10*3/mm3 8.60 7.96   HEMOGLOBIN g/dL 12.0* 12.2*   HEMATOCRIT % 38.2* 37.9*   PLATELETS 10*3/mm3 229 162       Results from last 7 days  Lab Units 06/13/17  0414 06/10/17  0406 06/09/17  0458   SODIUM mmol/L 139 141 138   POTASSIUM mmol/L 3.8 3.6 3.3*   CHLORIDE mmol/L 106 107 105   TOTAL CO2 mmol/L 24.0 24.0 23.0   BUN mg/dL 26* 26* 26*   CREATININE mg/dL 1.60* 1.50* 1.60*   GLUCOSE mg/dL 132* 129* 157*   CALCIUM mg/dL 10.2 10.2 9.9      132 (H) 174 (H)R 197 (H)R 127R 161 (H)       Cultures:  Blood Culture   Date Value Ref Range Status    06/05/2017 Streptococcus, Beta Hemolytic, Group C (A)  Preliminary   06/05/2017 Streptococcus, Beta Hemolytic, Group C (A)  Preliminary     Comment:     SEE CULTURE [id number] FOR SUSCEPTIBILITIES     Urine Culture   Date Value Ref Range Status   06/05/2017 30,000-40,000 CFU/mL Enterococcus species (A)  Preliminary     Echocardiogram 6/6/17  · Left ventricular systolic function is severely decreased. Estimated EF = 15%.  · At least Mild mitral valve regurgitation is present  · Moderate tricuspid valve regurgitation is present.  · Mild aortic valve regurgitation is present.  · Calculated right ventricular systolic pressure from tricuspid regurgitation is 49 mmHg.  Imaging Results (last 24 hours)     Procedure Component Value Units Date/Time    CT Head Without Contrast [[id number]] Collected:  06/15/17 1630     Updated:  06/15/17 1700    Narrative:       EXAMINATION: CT HEAD WITHOUT CONTRAST-06/15/2017:      INDICATION: Confusion; I46.9-Cardiac arrest, cause unspecified;  I49.01-Ventricular fibrillation; N39.0-Urinary tract infection, site not  specified; I50.22-Chronic systolic (congestive) heart failure;  A41.9-Sepsis, unspecified organism; Z66-Do not resuscitate; Z74.09-Other  reduced mobility; Z74.09-Other reduced mobility.         TECHNIQUE: CT scan of the head was performed at 5 mm intervals. No  intravenous contrast was utilized.     The radiation dose reduction device was turned on for each scan per the  ALARA (As Low as Reasonably Achievable) protocol.     COMPARISON: 03/05/2014.     FINDINGS: There is no intracranial mass. There is no hemorrhage. There  is no midline shift or extra-axial fluid collection. There is a small  area of low density in the right parietal subcortical region consistent  with a small old infarct. There is a second even smaller area of low  density in the left parasagittal subcortical region consistent with an  old infarct. There are no acute findings.       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 These were not present on the exam of 03/05/2014. There is no  acute finding.     D:  06/15/2017  E:  06/15/2017     This report was finalized on 6/15/2017 4:58 PM by Dr. Asa Connelly MD.             I have reviewed the medications.    Assessment/Plan     Active Problems:    Cardiomyopathy    Atherosclerosis of native coronary artery of native heart    Acute on chronic systolic congestive heart failure    Chronic kidney disease, stage IV (severe)    Diabetes mellitus    Hypertension    Urinary tract infection    Prolonged QT interval    Decubitus ulcer of coccygeal region, stage 2 (POA)    Gastroesophageal reflux disease    Gout    Hyperlipidemia    Mild dementia    Right bundle branch block    Chronic midline low back pain without sciatica    Acute strep bacteremia   - 3/4 bottles (+) with group C strep.   - Repeat blood cultures on 6/8 with NGTD  - Dr. Valdez of [facility] following, he does suspect a pathogen. Currently treating with IV Rocephin and PO Doxy   - No vegetation on TTE     Acute R forearm and L buttock cellulitis   - Antibiotics per ID, appear to be improving     Acute complicated UTI w/urinary frequency  - Urine culture (+) for enterococcus  - ID managing antibiotics   - Consider urology follow up [facility] for possible functional/anatomical abnormalities     Delirium with underlying dementia  - Delerium improving, patient sleeping more  - Continue low-dose Seroquel, restoril and Melatonin QHS  - Appreciate Neurology recommendations, CT of head from 6/13 (changed to STAT)   Reveals 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 not present on the exam of 03/05/2014. There is no  acute finding.  -Noted hospice consult by palliative today    Cardiac arrest with ventricular fibrillation   - Per cardiology. 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     Systolic heart failure  - Appears to be compensated. Poor EF  of 15%  - Cardiology following. Amiodarone only secondary to trifascicular block  -AND status, Palliative care now following    DM II   - 4/6/17 A1c 6.1%, good control.   --BG up likely r/t stress, requires minimal SSI    Urinary retention/probable BPH  - Ditropan  - Consider urology follow up at discharge.     CKD 4  -Cr stable    Hx of CABG    Generalized Weakness  -PT/OT consulted, recommending IPPT  CM working with family and has applied to [facility], family also interested in [facility]    Intermittent Urinary retention  -if requires I/O again may need luong for comfort but will likely require restraint  CODE STATUS: AND  DVT prophylaxis:  SC Heparin  Discharge Planning:family's goal is rehab at [place], lost bed 2nd to requiring sitter recently      Casie M Mayne, PA-C   06/15/17   2:30 PM

## 2017-06-15 NOTE — PLAN OF CARE
Problem: Patient Care Overview (Adult)  Goal: Plan of Care Review  Outcome: Ongoing (interventions implemented as appropriate)    06/15/17 0838   Coping/Psychosocial Response Interventions   Plan Of Care Reviewed With patient   Patient Care Overview   Progress improving   Outcome Evaluation   Outcome Summary/Follow up Plan pt less agitated,increased gait to 350 ft,needs cues for walker and exercises.has sitter in room         Problem: Inpatient Physical Therapy  Goal: Bed Mobility Goal LTG- PT  Outcome: Ongoing (interventions implemented as appropriate)    06/11/17 0922 06/12/17 1513 06/15/17 0838   Bed Mobility PT LTG   Bed Mobility PT LTG, Date Established 06/11/17 --  --    Bed Mobility PT LTG, Time to Achieve by discharge --  --    Bed Mobility PT LTG, Activity Type all bed mobility --  --    Bed Mobility PT LTG, Dyer Level independent --  --    Bed Mobility PT LTG, Date Goal Reviewed --  06/12/17 --    Bed Mobility PT LTG, Outcome --  --  goal ongoing       Goal: Transfer Training Goal 1 LTG- PT  Outcome: Ongoing (interventions implemented as appropriate)    06/11/17 0922 06/12/17 1513 06/15/17 0838   Transfer Training PT LTG   Transfer Training PT LTG, Date Established 06/11/17 --  --    Transfer Training PT LTG, Time to Achieve by discharge --  --    Transfer Training PT LTG, Activity Type all transfers --  --    Transfer Training PT LTG, Dyer Level contact guard assist --  --    Transfer Training PT LTG, Assist Device walker, rolling --  --    Transfer Training PT LTG, Date Goal Reviewed --  06/12/17 --    Transfer Training PT LTG, Outcome --  --  goal ongoing       Goal: Gait Training Goal LTG- PT  Outcome: Outcome(s) achieved Date Met:  06/15/17    06/11/17 0922 06/12/17 1513 06/15/17 0838   Gait Training PT LTG   Gait Training Goal PT LTG, Date Established 06/11/17 --  --    Gait Training Goal PT LTG, Time to Achieve by discharge --  --    Gait Training Goal PT LTG, Dyer Level  contact guard assist --  --    Gait Training Goal PT LTG, Assist Device walker, rolling --  --    Gait Training Goal PT LTG, Distance to Achieve 150 --  --    Gait Training Goal PT LTG, Date Goal Reviewed --  06/12/17 --    Gait Training Goal PT LTG, Outcome --  --  goal met

## 2017-06-15 NOTE — PROGRESS NOTES
"Palliative Care Progress Note    Code Status: Comfort measures and AND  HCS/HCPOA: children - dtr Lissa to bring in pts LW/HCS designation form     Subjective    Synopsis: 87 yowf with ischemic CM/EF 15%, CKD stage IV, systolic heart failure, and mild to moderate dementia. In  ED on 6/5 became unresponsive - V fib with single defibrillation and cpr with ROSC. BNP 2636. Sepsis with strep bacteremia, complicated enterococcal UTI, stage 2 coccyx decubitus ulcer and delirium.    CC: \"my back hurts\"    Past 24hrs: periods of confusion and some agitation. not agitated during bath - walked entire loop with PT and walker. Once in bed, became irritable and restless.    Meds given for symptoms: Ativan 0.5 mg IV and Phenobarb 30 mg IV at 0900. Seroquel 50 mg at hs.      Intake/Output Summary (Last 24 hours) at 06/15/17 1042  Last data filed at 06/15/17 0830   Gross per 24 hour   Intake             1040 ml   Output              725 ml   Net              315 ml     Last stool: this morning      amiodarone 200 mg Oral Q24H   ampicillin 250 mg Oral Q6H   ceftriaxone 1 g Intravenous Daily   heparin (porcine) 5,000 Units Subcutaneous Q12H   insulin lispro 0-9 Units Subcutaneous 4x Daily With Meals & Nightly   melatonin 5 mg Sublingual Nightly   oxybutynin XL 10 mg Oral Daily   pantoprazole 40 mg Oral Q AM   QUEtiapine 50 mg Oral Nightly   sertraline 50 mg Oral Daily   terazosin 1 mg Oral Nightly     •  acetaminophen  •  bisacodyl  •  dextrose  •  dextrose  •  glucagon (human recombinant)  •  LORazepam  •  metoclopramide  •  PHENobarbital  •  sodium chloride       Allergies   Allergen Reactions   • Beta Adrenergic Blockers    • Dipyridamole    • Dobutamine        ROS: very limited  Gen: \"I hurt all down my back\"    Objective    Temp:  [98.1 °F (36.7 °C)-98.4 °F (36.9 °C)] 98.1 °F (36.7 °C)  O2 on RA right now is 96%    Results from last 7 days  Lab Units 06/13/17  0414   WBC 10*3/mm3 8.60   HEMOGLOBIN g/dL 12.0*   HEMATOCRIT % 38.2* "   PLATELETS 10*3/mm3 229         Results from last 7 days  Lab Units 06/13/17  0414   SODIUM mmol/L 139   POTASSIUM mmol/L 3.8   CHLORIDE mmol/L 106   TOTAL CO2 mmol/L 24.0   BUN mg/dL 26*   CREATININE mg/dL 1.60*   CALCIUM mg/dL 10.2   BILIRUBIN mg/dL 0.6   ALK PHOS U/L 59   ALT (SGPT) U/L 19   AST (SGOT) U/L 20   GLUCOSE mg/dL 132*     PE:  Gen: elderly wm lying in bed mumbling, restless  Card: SR without ectopy. HT distant. No edema  Pulm: resps unlabored,   GI: abd flat, soft, nl BS  : voided earlier  Neuro: awake - son visited today - initially could not identify son, but at end of visit clearly stated sons ans son's friend who was visiting names.  Psych: restless    Assessment & Plan:    Info: 87 yowf with ischemic CM/EF 15%, CKD stage IV, systolic heart failure, and mild to moderate dementia, now with delirum    Problem 1. Pain - back - MSK and decub - could be contributing to restlessness  Plan: add Norco 5/325 mg - 1/2 tab q 6 hr    Problem 2. Reslessness/delirium r/t dementia, hospitalization and decline. Has been striking out at pt. Qtc on 6/5 was 566 ms.    Plan: Gave Haldol 0.5 mg IV X 1 for agitation.  pt is already on Seroquel 50 mg at hs - agitated and refusing oral meds off and on. Will increase Phenobarb to 60 mg IV q 6 h prn    Problem 3.  Urinary retention  Plan: will ask nurse to scan bladder to check for retention as source of restlessness     GOC: Has comfort measures and AND status. That said, family is hoping that pt will go to [facility] for rehab - they state he had this type of delirium after a hospital stay 10 yrs ago and cleared up. Reviewing his baseline with family - dementia - FAST 4ish. If not [facility], family would like to look into rehab placement at [facility], [facility] or [facility] or [facility].    Time: 60 mins were spent reviewing chart, talking with staff, pt., family, examining the pt., decision making, placing orders and performing documentation.   Called dtr Lissa  and discussed  pt. And POC  for 15 mins. Offered support

## 2017-06-15 NOTE — THERAPY TREATMENT NOTE
Acute Care - Physical Therapy Treatment Note  [facility]     Patient Name: José Miguel Mackey Jr.  : 1929  MRN: [id number]  Today's Date: 6/15/2017  Onset of Illness/Injury or Date of Surgery Date: 17  Date of Referral to PT: 17  Referring Physician: DO Fitz    Admit Date: 2017    Visit Dx:    ICD-10-CM ICD-9-CM   1. Cardiac arrest with ventricular fibrillation I46.9 427.5    I49.01 427.41   2. Acute UTI N39.0 599.0   3. Chronic systolic congestive heart failure I50.22 428.22     428.0   4. Sepsis, due to unspecified organism A41.9 038.9     995.91   5. Do-not-intubate resuscitation status Z66 V49.86   6. Impaired functional mobility, balance, gait, and endurance Z74.09 V49.89   7. Impaired mobility and ADLs Z74.09 799.89     Patient Active Problem List   Diagnosis   • Abdominal pain   • Anemia   • Ascites   • Benign essential hypertension   • Cardiomyopathy   • Chronic kidney disease, stage IV (severe)   • Confusional state   • Constipation   • Atherosclerosis of native coronary artery of native heart   • Dementia   • Depression   • Diabetes mellitus   • Edema   • Gastroesophageal reflux disease   • Gout   • Arthralgia of hip   • Hyperlipidemia   • Hypertension   • Knee pain   • Memory loss   • Osteoarthritis of knee   • Shortness of breath   • Sinus bradycardia   • Swelling of lower extremity   • Tremor   • Urinary tract infection   • Acute on chronic systolic congestive heart failure   • Dyslipidemia   • Mild dementia   • Right bundle branch block   • Weakness of both legs   • Chronic midline low back pain without sciatica   • Ventricular fibrillation   • Prolonged QT interval   • Decubitus ulcer of coccygeal region, stage 2 (POA)               Adult Rehabilitation Note       06/15/17 0805 17 1441       Rehab Assessment/Intervention    Discipline physical therapy assistant  -UD physical therapist  -MJ     Document Type therapy note (daily note)  -UD therapy note (daily note)   -MJ     Subjective Information agree to therapy;complains of;weakness  -UD agree to therapy;no complaints  -MJ     Patient Effort, Rehab Treatment good  -UD good  -MJ     Symptoms Noted During/After Treatment fatigue  -UD fatigue  -MJ     Precautions/Limitations fall precautions  -UD fall precautions  -MJ     Specific Treatment Considerations confusion  -UD      Recorded by [UD] Violeta Barriga PTA [MJ] Navjot Gifford, PT     Vital Signs    Pre Systolic BP Rehab  --   Vitals stable throughout  -MJ     O2 Delivery Post Treatment room air  -UD      Pre Patient Position Sitting  -UD      Intra Patient Position Standing  -UD      Post Patient Position Sitting  -UD      Recorded by [UD] Violeta Barriga PTA [MJ] Navjot Gifford, PT     Pain Assessment    Pain Assessment No/denies pain  -UD No/denies pain  -MJ     C[place] FACES Pain Rating 0  -UD      Pain Score 0  -UD      Recorded by [UD] Violeta Barirga PTA [MJ] Navjot Gifford, PT     Cognitive Assessment/Intervention    Current Cognitive/Communication Assessment  impaired  -MJ     Orientation Status oriented to;person  -UD oriented to;person   knew name, not birthday  -MJ     Follows Commands/Answers Questions 100% of the time  -% of the time;able to follow single-step instructions;needs cueing;needs repetition  -MJ     Personal Safety  mild impairment  -MJ     Personal Safety Interventions fall prevention program maintained  -UD      Recorded by [UD] Violeta Barriga PTA [MJ] Navjot Gifford, PT     Bed Mobility, Assessment/Treatment    Bed Mob, Supine to Sit, [place] not tested   up in chair  -UD not tested   Pt UIC  -MJ     Bed Mob, Sit to Supine, [place]  not tested   Pt UIC  -MJ     Recorded by [UD] Violeta Barriga PTA [MJ] Navjot Gifford, PT     Transfer Assessment/Treatment    Transfers, Sit-Stand [place] contact guard assist;2 person assist required  -UD contact guard assist;verbal cues required  -MJ     Transfers, Stand-Sit [place] contact guard assist;2  person assist required  -UD contact guard assist;verbal cues required  -MJ     Transfers, Sit-Stand-Sit, Assist Device rolling walker  -UD rolling walker  -MJ     Transfer, Safety Issues  sequencing ability decreased;step length decreased  -MJ     Transfer, Impairments  strength decreased  -MJ     Transfer, Comment  Verbal cues for correct hand placement  -MJ     Recorded by [UD] Violeta Barriga PTA [MJ] Navjot Gifford, PT     Gait Assessment/Treatment    Gait, [place] Level contact guard assist;2 person assist required  -UD minimum assist (75% patient effort);verbal cues required  -MJ     Gait, Assistive Device rolling walker  -UD rolling walker  -MJ     Gait, Distance (Feet) 350  -  -MJ     Gait, Gait Pattern Analysis  swing-through gait  -MJ     Gait, Gait Deviations forward flexed posture  -UD dani decreased;forward flexed posture;step length decreased  -MJ     Gait, Safety Issues step length decreased  -UD step length decreased  -MJ     Gait, Impairments strength decreased  -UD strength decreased;impaired balance  -MJ     Gait, Comment  Pt demo step through gait pattern at slow pace. Verbal cues for upright posture and to stay in middle of RW. Assist to steer RW during turns, otherwise CGA required. Cues for safety. Gait limited by fatigue  -MJ     Recorded by [UD] Violeta Barriga PTA [MJ] Navjot Gifford PT     Therapy Exercises    Bilateral Lower Extremities AROM:;10 reps;sitting  -UD --   Pt refused due to fatigue  -MJ     Bilateral Upper Extremity AROM:;10 reps;sitting  -UD      Recorded by [UD] Violeta Barriga PTA [MJ] Navjot Gifford PT     Positioning and Restraints    Pre-Treatment Position sitting in chair/recliner  -UD sitting in chair/recliner  -     Post Treatment Position chair  -UD chair  -MJ     In Chair notified nsg;reclined;sitting;call light within reach;exit alarm on;with other staff;legs elevated  -UD notified nsg;reclined;call light within reach;encouraged to call for assist;with  family/caregiver  -MJ     Recorded by [UD] Violeta Barriga, PTA [MJ] Navjot Gifford, PT       User Key  (r) = Recorded By, (t) = Taken By, (c) = Cosigned By    Initials Name Effective Dates    UD Violeta Barriga, PTA 06/22/15 -     MJ Navjot Gifford, PT 03/14/16 -                 IP PT Goals       06/15/17 0838 06/12/17 1513 06/11/17 0922    Bed Mobility PT LTG    Bed Mobility PT LTG, Date Established   06/11/17  -SR    Bed Mobility PT LTG, Time to Achieve   by discharge  -SR    Bed Mobility PT LTG, Activity Type   all bed mobility  -SR    Bed Mobility PT LTG, [place] Level   independent  -SR    Bed Mobility PT LTG, Date Goal Reviewed  06/12/17  -MJ     Bed Mobility PT LTG, Outcome goal ongoing  -UD goal ongoing  -MJ     Transfer Training PT LTG    Transfer Training PT LTG, Date Established   06/11/17  -SR    Transfer Training PT LTG, Time to Achieve   by discharge  -SR    Transfer Training PT LTG, Activity Type   all transfers  -SR    Transfer Training PT LTG, [place] Level   contact guard assist  -SR    Transfer Training PT LTG, Assist Device   walker, rolling  -SR    Transfer Training PT  LTG, Date Goal Reviewed  06/12/17  -MJ     Transfer Training PT LTG, Outcome goal ongoing  -UD goal ongoing  -MJ     Gait Training PT LTG    Gait Training Goal PT LTG, Date Established   06/11/17  -SR    Gait Training Goal PT LTG, Time to Achieve   by discharge  -SR    Gait Training Goal PT LTG, [place] Level   contact guard assist  -SR    Gait Training Goal PT LTG, Assist Device   walker, rolling  -SR    Gait Training Goal PT LTG, Distance to Achieve   150  -SR    Gait Training Goal PT LTG, Date Goal Reviewed  06/12/17  -MJ     Gait Training Goal PT LTG, Outcome goal met  -UD goal partially met   achieved distance  -MJ       User Key  (r) = Recorded By, (t) = Taken By, (c) = Cosigned By    Initials Name Provider Type    UD Violeta Barriga, PTA Physical Therapy Assistant    SR Kelsi Tobias, PT Physical Therapist    MAYRA Morfin  Balta, PT Physical Therapist          Physical Therapy Education     Title: PT OT SLP Therapies (Active)     Topic: Physical Therapy (Done)     Point: Mobility training (Done)    Learning Progress Summary    Learner Readiness Method Response Comment Documented by Status   Patient Acceptance E,D DU,NR   06/15/17 0838 Done    Acceptance E,D NR   06/12/17 1512 Active    Acceptance E,D NR   06/11/17 0921 Active   Family Acceptance E,D VU   06/12/17 1513 Done               Point: Home exercise program (Done)    Learning Progress Summary    Learner Readiness Method Response Comment Documented by Status   Patient Acceptance E,D DU,NR   06/15/17 0838 Done               Point: Body mechanics (Done)    Learning Progress Summary    Learner Readiness Method Response Comment Documented by Status   Patient Acceptance E,D DU,NR   06/15/17 0838 Done    Acceptance E,D NR   06/12/17 1512 Active    Acceptance E,D NR   06/11/17 0921 Active   Family Acceptance E,D VU   06/12/17 1513 Done               Point: Precautions (Done)    Learning Progress Summary    Learner Readiness Method Response Comment Documented by Status   Patient Acceptance E,D DU,NR   06/15/17 0838 Done    Acceptance E,D NR   06/12/17 1512 Active    Acceptance E,D NR   06/11/17 0921 Active   Family Acceptance E,D VU   06/12/17 1513 Done                      User Key     Initials Effective Dates Name Provider Type Discipline     06/22/15 -  Violeta Barriga, PTA Physical Therapy Assistant PT     06/19/15 -  Kelsi Tobias, PT Physical Therapist PT     03/14/16 -  Navjot Gifford, PT Physical Therapist PT                    PT Recommendation and Plan  Anticipated Discharge Disposition: inpatient rehabilitation facility  PT Frequency: daily, per priority policy  Plan of Care Review  Plan Of Care Reviewed With: patient  Progress: improving  Outcome Summary/Follow up Plan: pt less agitated,increased gait to 350 ft,needs cues for walker and  exercises.has sitter in room          Outcome Measures       06/15/17 0805 06/12/17 1441       How much help from another person do you currently need...    Turning from your back to your side while in flat bed without using bedrails? 3  -UD 3  -MJ     Moving from lying on back to sitting on the side of a flat bed without bedrails? 3  -UD 2  -MJ     Moving to and from a bed to a chair (including a wheelchair)? 3  -UD 3  -MJ     Standing up from a chair using your arms (e.g., wheelchair, bedside chair)? 3  -UD 3  -MJ     Climbing 3-5 steps with a railing? 2  -UD 2  -MJ     To walk in hospital room? 3  -UD 3  -MJ     AM-PAC 6 Clicks Score 17  -UD 16  -MJ     Functional Assessment    Outcome Measure Options  AM-PAC 6 Clicks Basic Mobility (PT)  -       User Key  (r) = Recorded By, (t) = Taken By, (c) = Cosigned By    Initials Name Provider Type    UD Violeta Barriga PTA Physical Therapy Assistant    MAYRA Gifford, PT Physical Therapist           Time Calculation:         PT Charges       06/15/17 0841          Time Calculation    PT Received On 06/15/17  -UD      PT Goal Re-Cert Due Date 06/21/17  -UD      Time Calculation- PT    Total Timed Code Minutes- PT 24 minute(s)  -UD        User Key  (r) = Recorded By, (t) = Taken By, (c) = Cosigned By    Initials Name Provider Type    RUBI Barriga PTA Physical Therapy Assistant          Therapy Charges for Today     Code Description Service Date Service Provider Modifiers Qty    04815117132 HC PT THER PROC EA 15 MIN 6/15/2017 Violeta Barriga PTA GP 1    80966776028 HC GAIT TRAINING EA 15 MIN 6/15/2017 Violeta Barriga PTA GP 1    58518485174 HC PT THER SUPP EA 15 MIN 6/15/2017 Violeta Barriga PTA GP 1          PT G-Codes  Outcome Measure Options: AM-PAC 6 Clicks Basic Mobility (PT)    Violeta Barriga PTA  6/15/2017

## 2017-06-15 NOTE — PROGRESS NOTES
[facility] Progress Note    6/5/2017      Antibiotics:  IV Anti-Infectives     Ordered     Dose/Rate Route Frequency Start Stop    06/10/17 0846  ampicillin (PRINCIPEN) capsule 250 mg     Ordering Provider:  Elian Valdez MD    250 mg Oral Every 6 Hours Scheduled 06/10/17 1200      06/10/17 0844  cefTRIAXone (ROCEPHIN) IVPB 1 g     Ordering Provider:  Elian Valdez MD    1 g  over 30 Minutes Intravenous Daily 06/10/17 1000      06/08/17 0856  DAPTOmycin (CUBICIN) 300 mg in sodium chloride 0.9 % 50 mL IVPB     Comments:  Dosed at 3 mg/kg (rounded)   Ordering Provider:  Elian Valdez MD    300 mg  100 mL/hr over 30 Minutes Intravenous Once 06/08/17 0930 06/08/17 1458    06/07/17 0953  doxycycline (VIBRAMYCIN) capsule 100 mg     Elian Valdez MD reviewed the order on 06/10/17 0849.   Ordering Provider:  Elian Valdez MD    100 mg Oral 2 Times Daily With Meals 06/07/17 1800 06/15/17 0752    06/05/17 2208  piperacillin-tazobactam (ZOSYN) 4.5 g/100 mL 0.9% NS IVPB (mbp)     Ordering Provider:  Jv Zuleta MD    4.5 g Intravenous Once 06/05/17 2210 06/05/17 2347          CC:Dyspnea    HPI:  Patient is a 87 y.o. Yr old male with history of advanced cardiomyopathy/prolonged QT with advanced chronic kidney disease in addition to type 2 diabetes and other numerous comorbidity. He is admitted on June 5, 2017 with subjective fever/chills, nausea/vomiting and dyspnea, had V. fib in the emergency room and converted. He was started on amiodarone. He has been found to have strep bacteremia, enterococcal UTI with urinary frequency.        He is not a good historian in general.     6/15/17  Nursing reports general debility and fluctuating confusion associated with poor sleep and has baseline dementia ; nursing says no new pain or problems otherwise    ROS:      6/15/17 per nursing No f/c/s. No n/v/d. No rash. No new ADR to Abx.     Constitutional-- generally fatigued with poor appetite  Heent-- No new vision,  "hearing or throat complaints.  No epistaxis or oral sores.  Denies odynophagia or dysphagia.  No flashers, floaters or eye pain. No odynophagia or dysphagia. No headache, photophobia or neck stiffness.  CV-- No chest pain, palpitation or syncope  Resp-- as above  GI- No nausea, vomiting, or diarrhea.  No hematochezia, melena, or hematemesis. Denies jaundice or chronic liver disease.  -- No dysuria, hematuria, or flank pain.  Denies hesitancy, urgency or flank pain.  Lymph- no swollen lymph nodes in neck/axilla or groin.   Heme- No active bruising or bleeding; no Hx of DVT or PE.  MS-- no swelling or pain in the bones or joints of arms/legs.  No new back pain.  Neuro-- No acute focal weakness or numbness in the arms or legs.  No seizures. Baseline dementia and generally confused    Full 12 point review of systems reviewed and negative otherwise for acute complaints, other than above      PE:   BP 90/65  Pulse 87  Temp 98.1 °F (36.7 °C) (Oral)   Resp 20  Ht 67\" (170.2 cm)  Wt 152 lb 3.2 oz (69 kg)  SpO2 99%  BMI 23.84 kg/m2    GENERAL: seen early, confused/dementia, in no acute distress.   HEENT: Normocephalic, atraumatic.  PERRL. EOMI. No conjunctival injection. No icterus. Oropharynx clear without evidence of thrush or exudate. No evidence of peridontal disease.    NECK: Supple without nuchal rigidity. No mass.  LYMPH: No cervical, axillary or inguinal lymphadenopathy.  HEART: RRR; No murmur, rubs, gallops.   LUNGS: Clear to auscultation bilaterally without wheezing, rales, rhonchi. Normal respiratory effort. Nonlabored. No dullness.  ABDOMEN: Soft, nontender, nondistended. Positive bowel sounds. No rebound or guarding. NO mass or HSM.  EXT:  No cyanosis, clubbing or edema. No cord.  : Genitalia generally unremarkable.  Without Puentes catheter.  MSK: FROM without joint effusions noted arms/legs.    SKIN: Warm and dry without cutaneous eruptions on Inspection/palpation.    NEURO: Moves all 4's but generally " weak    Right forearm vaguely red/warm with no palpable cord. No open wounds or active drainage. Slight tenderness but no discrete mass bulge or fluctuance. No crepitus or bulla.      No peripheral stigmata/phenomena of endocarditis     Left upper buttock with small area of erythema induration warmth and tenderness but faded in intensity and no discrete mass bulge or fluctuance. No crepitus or bulla and no open wound or drainage        Laboratory Data      Results from last 7 days  Lab Units 06/13/17  0414 06/09/17  0458   WBC 10*3/mm3 8.60 7.96   HEMOGLOBIN g/dL 12.0* 12.2*   HEMATOCRIT % 38.2* 37.9*   PLATELETS 10*3/mm3 229 162       Results from last 7 days  Lab Units 06/13/17  0414   SODIUM mmol/L 139   POTASSIUM mmol/L 3.8   CHLORIDE mmol/L 106   TOTAL CO2 mmol/L 24.0   BUN mg/dL 26*   CREATININE mg/dL 1.60*   GLUCOSE mg/dL 132*   CALCIUM mg/dL 10.2       Results from last 7 days  Lab Units 06/13/17  0414   ALK PHOS U/L 59   BILIRUBIN mg/dL 0.6   ALT (SGPT) U/L 19   AST (SGOT) U/L 20               Estimated Creatinine Clearance: 30.4 mL/min (by C-G formula based on Cr of 1.6).      Microbiology:      Radiology:  Imaging Results (last 72 hours)     Procedure Component Value Units Date/Time    XR Chest 1 View [[id number]] Collected:  06/07/17 0934     Updated:  06/07/17 1020    Narrative:       EXAMINATION: XR CHEST 1 VIEW-06/07/2017:      INDICATION: Congestive heart failure. Questionable pneumonia;  I46.9-Cardiac arrest, cause unspecified; I49.01-Ventricular  fibrillation; N39.0-Urinary tract infection, site not specified;  I50.22-Chronic systolic (congestive) heart failure; A41.9-Sepsis,  unspecified organism; Z66-Do not resuscitate.      COMPARISON: 06/05/2017.     FINDINGS: The heart is large but compensated. There is no acute  inflammatory process. There is no mass or effusion.           Impression:       Cardiomegaly, compensated. There are no acute pulmonary  findings.     D:  06/07/2017  E:  06/07/2017      This report was finalized on 6/7/2017 10:18 AM by Dr. Asa Connelly MD.               Impression:   --Acute strep bacteremia. In 2 sets and I suspect a pathogen. Concerns with respect to source include skin/soft tissue and respiratory tract. Vague chest x-ray changes with possible pneumonia. He does have several areas of soft tissue inflammation including left upper buttock and right arm. No other obvious musculoskeletal focus. Seems to be improving with empiric antibiotics with no stigmata of endocarditis. Transthoracic echocardiogram no vegetation, discussed directly with Dr. Monique.  Follow-up cultures pending.     --Acute complicated UTI with urinary frequency and enterococcus in culture. He likely has some functional/anatomic disturbance and you would need to consider referral to urology if he becomes agreeable as an outpatient. Any option/timing or threshold for further workup of functional/anatomic disturbance would be left to urology and if found and treated, may help minimize risk for relapse. He will consider his options.     --Acute right forearm cellulitis. Improving with supportive measures and antibiotics. Monitor. No evidence for abscess or necrotic focus. Unclear based on patient's history whether this was present at admission or a result of peripheral IV after admission.     --Acute left upper buttock cellulitis. Unclear if this was related to pressure or some other cutaneous trauma. It is improving with no obvious abscess or necrotic focus. Continue supportive measures and antibiotics.     --Chronic kidney disease, stage IV. Monitor to help guide further adjustments and antimicrobials     --Dyspnea. Cough improved and at least CHF but also possibly some component of respiratory tract infection. Empiric antibiotics ongoing. Cough not productive and no hemoptysis. If he does not steadily improve consideration could be given to repeat chest imaging     --Cardiac arrest with ventricular fibrillation  and prolonged QT interval.     --Severe/advanced cardiomyopathy with CHF per cardiology    PLAN:    --IV rocephin/ Oral ampicillin, doxy     --Discussed with Dr. Byrnes and micro and case mangement     --Partial history per nursing staff, discussed with microbiology      --Highly complex set of issues with high risk for further serious morbidity and other serious sequela with generally guarded long-term prognosis      Elian Valdez MD  6/15/2017

## 2017-06-16 NOTE — PROGRESS NOTES
[facility] Progress Note    6/5/2017      Antibiotics:  IV Anti-Infectives     Ordered     Dose/Rate Route Frequency Start Stop    06/15/17 2029  cefTRIAXone (ROCEPHIN) injection 1 g     Ordering Provider:  SEBASTIAN Ewdards    1 g Intramuscular Every 24 Hours 06/16/17 0800 06/19/17 0759    06/10/17 0846  ampicillin (PRINCIPEN) capsule 250 mg     Ordering Provider:  Elian Valdez MD    250 mg Oral Every 6 Hours Scheduled 06/10/17 1200      06/08/17 0856  DAPTOmycin (CUBICIN) 300 mg in sodium chloride 0.9 % 50 mL IVPB     Comments:  Dosed at 3 mg/kg (rounded)   Ordering Provider:  Elian Valdez MD    300 mg  100 mL/hr over 30 Minutes Intravenous Once 06/08/17 0930 06/08/17 1458    06/07/17 0953  doxycycline (VIBRAMYCIN) capsule 100 mg     Elian Valdez MD reviewed the order on 06/10/17 0849.   Ordering Provider:  Elian Valdez MD    100 mg Oral 2 Times Daily With Meals 06/07/17 1800 06/15/17 0752    06/05/17 2208  piperacillin-tazobactam (ZOSYN) 4.5 g/100 mL 0.9% NS IVPB (mbp)     Ordering Provider:  Jv Zuleta MD    4.5 g Intravenous Once 06/05/17 2210 06/05/17 2347          CC:Dyspnea    HPI:  Patient is a 87 y.o. Yr old male with history of advanced cardiomyopathy/prolonged QT with advanced chronic kidney disease in addition to type 2 diabetes and other numerous comorbidity. He is admitted on June 5, 2017 with subjective fever/chills, nausea/vomiting and dyspnea, had V. fib in the emergency room and converted. He was started on amiodarone. He has been found to have strep bacteremia, enterococcal UTI with urinary frequency.        He is not a good historian in general.     6/16/17  Nursing reports general debility and fluctuating confusion associated with poor sleep and has baseline dementia ; nursing says no new pain or problems otherwise; IV out, Rocephin transition to intramuscular    ROS:      6/16/17 per nursing No f/c/s. No n/v/d. No rash. No new ADR to Abx.     PE:   /96  Pulse 91   "Temp 97.1 °F (36.2 °C) (Axillary)   Resp 18  Ht 67\" (170.2 cm)  Wt 152 lb 3.2 oz (69 kg)  SpO2 99%  BMI 23.84 kg/m2    GENERAL: seen early, confused/dementia, in no acute distress.   HEENT: Normocephalic, atraumatic.  PERRL. EOMI. No conjunctival injection. No icterus. Oropharynx clear without evidence of thrush or exudate. No evidence of peridontal disease.    NECK: Supple without nuchal rigidity. No mass.  LYMPH: No cervical, axillary or inguinal lymphadenopathy.  HEART: RRR; No murmur, rubs, gallops.   LUNGS: Clear to auscultation bilaterally without wheezing, rales, rhonchi. Normal respiratory effort. Nonlabored. No dullness.  ABDOMEN: Soft, nontender, nondistended. Positive bowel sounds. No rebound or guarding. NO mass or HSM.  EXT:  No cyanosis, clubbing or edema. No cord.  : Genitalia generally unremarkable.  Without Puentes catheter.  MSK: FROM without joint effusions noted arms/legs.    SKIN: Warm and dry without cutaneous eruptions on Inspection/palpation.    NEURO: Moves all 4's but generally weak    Right forearm vaguely red/warm with no palpable cord. No open wounds or active drainage. Slight tenderness but no discrete mass bulge or fluctuance. No crepitus or bulla.      No peripheral stigmata/phenomena of endocarditis     Left upper buttock with small area of erythema induration warmth and tenderness but faded in intensity and no discrete mass bulge or fluctuance. No crepitus or bulla and no open wound or drainage        Laboratory Data      Results from last 7 days  Lab Units 06/15/17  1378 06/13/17 0414   WBC 10*3/mm3 9.36 8.60   HEMOGLOBIN g/dL 11.8* 12.0*   HEMATOCRIT % 37.7* 38.2*   PLATELETS 10*3/mm3 231 229       Results from last 7 days  Lab Units 06/13/17 0414   SODIUM mmol/L 139   POTASSIUM mmol/L 3.8   CHLORIDE mmol/L 106   TOTAL CO2 mmol/L 24.0   BUN mg/dL 26*   CREATININE mg/dL 1.60*   GLUCOSE mg/dL 132*   CALCIUM mg/dL 10.2       Results from last 7 days  Lab Units 06/13/17  0414 "   ALK PHOS U/L 59   BILIRUBIN mg/dL 0.6   ALT (SGPT) U/L 19   AST (SGOT) U/L 20               Estimated Creatinine Clearance: 30.4 mL/min (by C-G formula based on Cr of 1.6).      Microbiology:      Radiology:  Imaging Results (last 72 hours)     Procedure Component Value Units Date/Time    XR Chest 1 View [[id number]] Collected:  06/07/17 0934     Updated:  06/07/17 1020    Narrative:       EXAMINATION: XR CHEST 1 VIEW-06/07/2017:      INDICATION: Congestive heart failure. Questionable pneumonia;  I46.9-Cardiac arrest, cause unspecified; I49.01-Ventricular  fibrillation; N39.0-Urinary tract infection, site not specified;  I50.22-Chronic systolic (congestive) heart failure; A41.9-Sepsis,  unspecified organism; Z66-Do not resuscitate.      COMPARISON: 06/05/2017.     FINDINGS: The heart is large but compensated. There is no acute  inflammatory process. There is no mass or effusion.           Impression:       Cardiomegaly, compensated. There are no acute pulmonary  findings.     D:  06/07/2017  E:  06/07/2017     This report was finalized on 6/7/2017 10:18 AM by Dr. Asa Connelly MD.               Impression:   --Acute strep bacteremia. In 2 sets and I suspect a pathogen. Concerns with respect to source include skin/soft tissue and respiratory tract. Vague chest x-ray changes with possible pneumonia. He does have several areas of soft tissue inflammation including left upper buttock and right arm. No other obvious musculoskeletal focus. Seems to be improving with empiric antibiotics with no stigmata of endocarditis. Transthoracic echocardiogram no vegetation, discussed directly with Dr. Monique.  Follow-up cultures pending.     --Acute complicated UTI with urinary frequency and enterococcus in culture. He likely has some functional/anatomic disturbance and you would need to consider referral to urology if he becomes agreeable as an outpatient. Any option/timing or threshold for further workup of functional/anatomic  disturbance would be left to urology and if found and treated, may help minimize risk for relapse. He will consider his options.     --Acute right forearm cellulitis. Improving with supportive measures and antibiotics. Monitor. No evidence for abscess or necrotic focus. Unclear based on patient's history whether this was present at admission or a result of peripheral IV after admission.     --Acute left upper buttock cellulitis. Unclear if this was related to pressure or some other cutaneous trauma. It is improving with no obvious abscess or necrotic focus. Continue supportive measures and antibiotics.     --Chronic kidney disease, stage IV. Monitor to help guide further adjustments and antimicrobials     --Dyspnea. Cough improved and at least CHF but also possibly some component of respiratory tract infection. Empiric antibiotics ongoing. Cough not productive and no hemoptysis. If he does not steadily improve consideration could be given to repeat chest imaging     --Cardiac arrest with ventricular fibrillation and prolonged QT interval.     --Severe/advanced cardiomyopathy with CHF per cardiology    PLAN:    --IM rocephin/ Oral ampicillin, doxy     --Discussed with family and micro and case mangement     --Partial history per nursing staff, discussed with microbiology      --Highly complex set of issues with high risk for further serious morbidity and other serious sequela with generally guarded long-term prognosis    --I am out until Monday.  Call me if needed sooner. [phone]      Elian Valdez MD  6/16/2017

## 2017-06-16 NOTE — PROGRESS NOTES
Continued Stay Note  [facility]     Patient Name: José Miguel Mackey Jr.  MRN: [id number]  Today's Date: 6/16/2017    Admit Date: 6/5/2017          Discharge Plan       06/16/17 0851    Case Management/Social Work Plan    Plan home vs. [facility]    Patient/Family In Agreement With Plan yes    Additional Comments JESSICA spoke with Dhara with [facility] to re-evaluate referral.  Pt no longer requiring sitter and is participating in therapy.  Awaiting call back from [facility].  CM will continue to follow for discharge placement needs, ext. [phone].               Discharge Codes     None        Expected Discharge Date and Time     Expected Discharge Date Expected Discharge Time    Jun 18, 2017             Sabiha Gonzalez RN

## 2017-06-16 NOTE — PROGRESS NOTES
[facility] Medicine Services  INPATIENT PROGRESS NOTE    Date of Admission: 6/5/2017  Length of Stay: 11  Primary Care Physician: No Known Provider    Subjective   CC: confusion  Subjective:  Patient continues to be restless.  Confused but answers questions appropriately.  Wife and daughter at bedside.  Sitter stopped and patient to be re-evaluated for [facility].         Review of Systems   Unable to obtain ROS 2nd to AMS    Objective      Temp:  [97.1 °F (36.2 °C)-98.2 °F (36.8 °C)] 97.1 °F (36.2 °C)  Heart Rate:  [61-91] 91  Resp:  [18] 18  BP: ()/(65-96) 134/96     Physical Exam  Constitutional: frail appearing elderly man, restless, appears in NAD  Neck: supple, trachea midline  Respiratory: Clear to auscultation bilaterally without wheezes, rales or rhonchi. Nonlabored respirations   Cardiovascular: RRR, no murmur  Gastrointestinal: Positive bowel sounds, soft, nondistended  Musculoskeletal: No bilateral ankle edema, no clubbing or cyanosis to bilateral lower extremities.   Psychiatric: confused, restless, agitated at times   Neurologic: alert and oriented x 1, follows some commands and answers some questions appropriately  Skin: Right distal UE erythema and edema without fluctuance or discrete mass.     Results Review:    I have reviewed the labs, radiology results and diagnostic studies.    Results from last 7 days  Lab Units 06/15/17  2338 06/13/17  0414   WBC 10*3/mm3 9.36 8.60   HEMOGLOBIN g/dL 11.8* 12.0*   HEMATOCRIT % 37.7* 38.2*   PLATELETS 10*3/mm3 231 229       Results from last 7 days  Lab Units 06/13/17  0414 06/10/17  0406   SODIUM mmol/L 139 141   POTASSIUM mmol/L 3.8 3.6   CHLORIDE mmol/L 106 107   TOTAL CO2 mmol/L 24.0 24.0   BUN mg/dL 26* 26*   CREATININE mg/dL 1.60* 1.50*   GLUCOSE mg/dL 132* 129*   CALCIUM mg/dL 10.2 10.2      132 (H) 174 (H)R 197 (H)R 127R 161 (H)       Cultures:  Blood Culture   Date Value Ref Range Status   06/05/2017 Streptococcus, Beta  Hemolytic, Group C (A)  Preliminary   06/05/2017 Streptococcus, Beta Hemolytic, Group C (A)  Preliminary     Comment:     SEE CULTURE [id number] FOR SUSCEPTIBILITIES     Urine Culture   Date Value Ref Range Status   06/05/2017 30,000-40,000 CFU/mL Enterococcus species (A)  Preliminary     Echocardiogram 6/6/17  · Left ventricular systolic function is severely decreased. Estimated EF = 15%.  · At least Mild mitral valve regurgitation is present  · Moderate tricuspid valve regurgitation is present.  · Mild aortic valve regurgitation is present.  · Calculated right ventricular systolic pressure from tricuspid regurgitation is 49 mmHg.  Imaging Results (last 24 hours)     Procedure Component Value Units Date/Time    CT Head Without Contrast [[id number]] Collected:  06/15/17 1630     Updated:  06/15/17 1700    Narrative:       EXAMINATION: CT HEAD WITHOUT CONTRAST-06/15/2017:      INDICATION: Confusion; I46.9-Cardiac arrest, cause unspecified;  I49.01-Ventricular fibrillation; N39.0-Urinary tract infection, site not  specified; I50.22-Chronic systolic (congestive) heart failure;  A41.9-Sepsis, unspecified organism; Z66-Do not resuscitate; Z74.09-Other  reduced mobility; Z74.09-Other reduced mobility.         TECHNIQUE: CT scan of the head was performed at 5 mm intervals. No  intravenous contrast was utilized.     The radiation dose reduction device was turned on for each scan per the  ALARA (As Low as Reasonably Achievable) protocol.     COMPARISON: 03/05/2014.     FINDINGS: There is no intracranial mass. There is no hemorrhage. There  is no midline shift or extra-axial fluid collection. There is a small  area of low density in the right parietal subcortical region consistent  with a small old infarct. There is a second even smaller area of low  density in the left parasagittal subcortical region consistent with an  old infarct. There are no acute findings.       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 These were not present on the exam of 03/05/2014. There is no  acute finding.     D:  06/15/2017  E:  06/15/2017     This report was finalized on 6/15/2017 4:58 PM by Dr. Asa Connelly MD.             I have reviewed the medications.    Assessment/Plan     Active Problems:    Cardiomyopathy    Chronic kidney disease, stage IV (severe)    Atherosclerosis of native coronary artery of native heart    Diabetes mellitus    Gastroesophageal reflux disease    Gout    Hyperlipidemia    Hypertension    Urinary tract infection    Acute on chronic systolic congestive heart failure    Mild dementia    Right bundle branch block    Chronic midline low back pain without sciatica    Prolonged QT interval    Decubitus ulcer of coccygeal region, stage 2 (POA)    Acute strep bacteremia   - 3/4 bottles (+) with group C strep.   - Repeat blood cultures on 6/8 with NGTD  - Dr. Valdez of [facility] following, he does suspect a pathogen. Currently treating with IV Rocephin and PO Doxy   - No vegetation on TTE     Acute R forearm and L buttock cellulitis   - Antibiotics per ID, appear to be improving     Acute complicated UTI w/urinary frequency  - Urine culture (+) for enterococcus  - ID managing antibiotics   - Consider urology follow up at [place] for possible functional/anatomical abnormalities     Delirium with underlying dementia  - Delerium improving, patient sleeping more  - Continue low-dose Seroquel, restoril and Melatonin QHS  - Appreciate Neurology recommendations   - CT of head from 6/15 --Reveals 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 not present on the exam of 03/05/2014. There is no  acute finding.  -Noted hospice consult by palliative     Cardiac arrest with ventricular fibrillation   - Per cardiology. 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     Systolic heart failure  - Appears to be compensated. Poor EF of 15%  - Cardiology following. Amiodarone only  secondary to trifascicular block  - AND status, Palliative care following    DM II   - 4/6/17 A1c 6.1%, good control.   - BG up likely r/t stress, requires minimal SSI  - PO intake decreased    Urinary retention/probable BPH  - Ditropan  - Consider urology follow up at discharge.     CKD 4  -Cr stable    Hx of CABG    Generalized Weakness  -PT/OT consulted, recommending IPPT  CM working with family and has applied to [facility], family also interested in [facility]    Intermittent Urinary retention  -if requires I/O again may need luong for comfort but will likely require restraint    CODE STATUS: AND  DVT prophylaxis:  SC Heparin  Discharge Planning:  No longer requiring sitter and participating in therapy.  [facility] to re-evaluate referral.   Plan to rehab when bed available.        Clara Mina, APRN   06/16/17   8:58 AM

## 2017-06-16 NOTE — SIGNIFICANT NOTE
Palliative Team Meeting Attendance  13:00                DO GUILLERMO Brooks RN, CHPN              GUILLERMO Lieberman M.Div., [facility]          Gracie Hillman RN, KEELY CORTES

## 2017-06-16 NOTE — PROGRESS NOTES
"José Miguel Mackey Jr.    Subjective     CC: delirium     History of Present Illness     José Miguel Mackey Jr. is followed with delirium. He is drowsy this morning. He continues to be confused though answers many questions appropriately and follows commands well.     There have been no other changes in the patient's interval history since my last note yesterday, 6/15/17.         The following portions of the patient's history were reviewed and updated as appropriate: allergies, current medications, past family history, past medical history, past social history, past surgical history and problem list.    Review of Systems   Constitutional: Negative.    HENT: Negative.    Eyes: Negative.    Respiratory: Negative.    Cardiovascular: Negative.    Gastrointestinal: Negative.    Endocrine: Negative.    Genitourinary: Negative.    Musculoskeletal: Negative.    Skin: Negative.    Allergic/Immunologic: Negative.    Neurological:        As noted in HPI   Hematological: Negative.    Psychiatric/Behavioral:        As noted in HPI       Objective     /96  Pulse 91  Temp 97.1 °F (36.2 °C) (Axillary)   Resp 18  Ht 67\" (170.2 cm)  Wt 152 lb 3.2 oz (69 kg)  SpO2 99%  BMI 23.84 kg/m2    Physical Exam   Neurological: He has normal strength.   Psychiatric: His speech is normal.        Neurologic Exam     Mental Status   Oriented to person.   Disoriented to city. (Oriented to state)  Disoriented to time. Disoriented to year, month, date and day. Oriented to season.   Registration: recalls 2 of 3 objects. Follows 1 step commands.   Attention: decreased.   Speech: speech is normal   Level of consciousness: drowsy  Able to name object. Normal comprehension.     Cranial Nerves   Cranial nerves II through XII intact.     Motor Exam   Muscle bulk: normal  Overall muscle tone: normal    Strength   Strength 5/5 throughout.     Sensory Exam   Light touch normal.     Gait, Coordination, and Reflexes     Tremor   Resting tremor: " absent      A head CT is notable for small old infarcts in the parietal lobes bilaterally, but otherwise unremarkable.     Assessment/Plan        José Miguel Mackey Jr.  suffers from a delirium superimposed on his prior history of dementia. He will continue on his current medications unchanged for now. He will likely need at least subacute rehab placement upon discharge.        As part of this visit I obtained additional history from the family which is incorporated in the HPI and reviewed records from the current hospitalization which is incorporated in the HPI.

## 2017-06-16 NOTE — SIGNIFICANT NOTE
Palliative Team Meeting Attendance  13:00                DO GUILLERMO Brooks RN, CHPN              GUILLERMO Lieberman M.Div., [facility]          Gracie Hillman RN, KEELY CORTES

## 2017-06-16 NOTE — PLAN OF CARE
Problem: Patient Care Overview (Adult)  Goal: Plan of Care Review  Outcome: Ongoing (interventions implemented as appropriate)    06/16/17 1415   Coping/Psychosocial Response Interventions   Plan Of Care Reviewed With patient   Patient Care Overview   Progress progress towards functional goals is fair   Outcome Evaluation   Outcome Summary/Follow up Plan pt more alert,needs lots of verbal cues.pt walked 350 ft with walker.did exercises in chair,family present         Problem: Inpatient Physical Therapy  Goal: Bed Mobility Goal LTG- PT  Outcome: Ongoing (interventions implemented as appropriate)    06/11/17 0922 06/12/17 1513 06/16/17 1415   Bed Mobility PT LTG   Bed Mobility PT LTG, Date Established 06/11/17 --  --    Bed Mobility PT LTG, Time to Achieve by discharge --  --    Bed Mobility PT LTG, Activity Type all bed mobility --  --    Bed Mobility PT LTG, [place] Level independent --  --    Bed Mobility PT LTG, Date Goal Reviewed --  06/12/17 --    Bed Mobility PT LTG, Outcome --  --  goal ongoing       Goal: Transfer Training Goal 1 LTG- PT  Outcome: Outcome(s) achieved Date Met:  06/16/17 06/11/17 0922 06/12/17 1513 06/16/17 1415   Transfer Training PT LTG   Transfer Training PT LTG, Date Established 06/11/17 --  --    Transfer Training PT LTG, Time to Achieve by discharge --  --    Transfer Training PT LTG, Activity Type all transfers --  --    Transfer Training PT LTG, [place] Level contact guard assist --  --    Transfer Training PT LTG, Assist Device walker, rolling --  --    Transfer Training PT LTG, Date Goal Reviewed --  06/12/17 --    Transfer Training PT LTG, Outcome --  --  goal met

## 2017-06-16 NOTE — PROGRESS NOTES
Continued Stay Note   [place]     Patient Name: José Miguel Mackey Jr.  MRN: [id number]  Today's Date: 6/16/2017    Admit Date: 6/5/2017          Discharge Plan       06/16/17 1654    Case Management/Social Work Plan    Plan Undetermined    Additional Comments Hospice consult received.  Chart reviewed.  Visited with pt who was sitting up in recliner.  Sitter at bedside.  Pt pleasant with no complaints.  Spoke with Rochelle xavier on the phone and have a family meeting set up for Monday, June 19th at 1300.  Spouse and adult children to attend.  Left message with , Sabiha Gonzalez regarding this plan.  If hospice RN can be of further assist, call ext [phone].       06/16/17 1520    Case Management/Social Work Plan    Plan ongoing    Patient/Family In Agreement With Plan yes    Additional Comments Spoke to family at bedside.  Discharge plan undetermined at this time.  Hospice to see.  [facility] is following.  Family wants to see how patient does through the weekend to determine course of discharge plan.  Pt may need skilled rehab at d/c if doesn't qualify for [facility].  CM will follow up on Monday AM, please call weekend CM at ext. [phone] with questions.               Discharge Codes     None        Expected Discharge Date and Time     Expected Discharge Date Expected Discharge Time    Jun 18, 2017             Maria Isabel Webb RN

## 2017-06-16 NOTE — PROGRESS NOTES
Continued Stay Note   [facility]     Patient Name: José Miguel Mackey Jr.  MRN: [id number]  Today's Date: 6/16/2017    Admit Date: 6/5/2017          Discharge Plan       06/16/17 1520    Case Management/Social Work Plan    Plan ongoing    Patient/Family In Agreement With Plan yes    Additional Comments Spoke to family at bedside.  Discharge plan undetermined at this time.  Hospice to see.  [facility] is following.  Family wants to see how patient does through the weekend to determine course of discharge plan.  Pt may need skilled rehab at d/c if doesn't qualify for [facility].  CM will follow up on Monday AM, please call weekend CM at ext. [phone] with questions.               Discharge Codes     None        Expected Discharge Date and Time     Expected Discharge Date Expected Discharge Time    Jun 18, 2017             Sabiha Gonzalez RN

## 2017-06-16 NOTE — PLAN OF CARE
Problem: Fall Risk (Adult)  Intervention: Monitor/Assist with Self Care    06/16/17 1300 06/16/17 1445   Musculoskeletal Interventions   Self-Care Promotion --  independence encouraged;meal setup provided   Activity   Activity Type activity adjusted per tolerance --    Activity Level of Assistance --  assistance, 2 people   Monitor/Assist with Self Care   Ambulation --  2-->assistive person   Transferring --  2-->assistive person   Toileting --  2-->assistive person   Bathing --  2-->assistive person   Dressing --  2-->assistive person   Eating --  0-->independent   Communication --  0-->understands/communicates without difficulty         Goal: Identify Related Risk Factors and Signs and Symptoms  Outcome: Ongoing (interventions implemented as appropriate)  Goal: Absence of Falls  Outcome: Ongoing (interventions implemented as appropriate)

## 2017-06-16 NOTE — PROGRESS NOTES
"Adult Nutrition  Assessment/PES    Patient Name:  José Miguel Mackey Jr.  YOB: 1929  MRN: [id number]  Admit Date:  6/5/2017    Assessment Date:  6/16/2017        Reason for Assessment       06/16/17 1441    Reason for Assessment    Reason For Assessment/Visit follow up protocol    Time Spent (min) 20    Diagnosis Diagnosis   per notes this admission                Nutrition/Diet History       06/16/17 1441    Nutrition/Diet History    Reported/Observed By Patient;Nursing Assistant    Food Habit/Preferences Uses Supplements    Other Patient asleep at time of visit. Per RN/tech in room, unsure of patients appetite today. Observed patients lunch tray, 100% was eaten. Observed a couple open boost supplements. No family in room to verify intake.            Anthropometrics       06/16/17 1442    Anthropometrics (Special Considerations)    Height Used for Calculations 1.702 m (5' 7\")    Weight Used for Calculations 69 kg (152 lb 1.9 oz)            Labs/Tests/Procedures/Meds       06/16/17 1442    Labs/Tests/Procedures/Meds    Labs/Tests Review Reviewed                Nutrition Prescription Ordered       06/16/17 1442    Nutrition Prescription PO    Current PO Diet Regular    Supplement Boost Plus    Supplement Frequency 3 times a day    Common Modifiers Cardiac;Consistent Carbohydrate            Evaluation of Received Nutrient/Fluid Intake       06/16/17 1443    PO Evaluation    Number of Meals 9    % PO Intake 29%   patient also drinking supplements          Problem/Interventions:        Problem 2       06/16/17 1443    Nutrition Diagnoses Problem 2    Problem 2 Inadequate Intake/Infusion    Inadequate Intake Type Oral    Etiology (related to) --   clinical condition    Signs/Symptoms (evidenced by) PO Intake    Percent (%) intake recorded 29 %    Over number of meals 9   patient also drinking boost supplements                Intervention Goal       06/16/17 1444    Intervention Goal    General Nutrition " "support treatment;Palliative Care   per APRN note 6/16 \"-Noted hospice consult by palliative \"            Nutrition Intervention       06/16/17 1444    Nutrition Intervention    RD/Tech Action Follow Tx progress;Care plan reviewd              Education/Evaluation       06/16/17 1445    Monitor/Evaluation    Monitor Per protocol;PO intake;Supplement intake   pending GOC          Electronically signed by:  Betty Zamarripa  06/16/17 2:45 PM  "

## 2017-06-16 NOTE — PROGRESS NOTES
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 In ED on 6/5 became unresponsive - V fib with single defibrillation and CPR with ROSC. BNP 2636. Sepsis with strep bacteremia, complicated enterococcal UTI, stage 2 coccyx decubitus ulcer and delirium. Ongoing agitation and restlessness possibly jeopardizing  transfer to [facility].    Echocardiogram 6/6/17  · Left ventricular systolic function is severely decreased. Estimated EF = 15%.  At least Mild mitral valve regurgitation is present  · Moderate tricuspid valve regurgitation is present.  · Mild aortic valve regurgitation is present.  · Calculated right ventricular systolic pressure from tricuspid regurgitation is 49 mmHg.  ·   CT Head 6/15/17  FINDINGS: There is no intracranial mass. There is no hemorrhage. There is no midline shift or extra-axial fluid collection. 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 There is a second even smaller area of low  density in the left parasagittal subcortical region consistent with an old infarct. There are no acute findings.         CC: none    Past 24hrs: Pt has been resting better on OMS 2 mg ordered last night    Meds given for symptoms: Scheduled Seroquel 50 mg at hs, OMS 2 mg X 2. No Ativan or Phenobarb recorded.      Intake/Output Summary (Last 24 hours) at 06/16/17 0931  Last data filed at 06/16/17 0900   Gross per 24 hour   Intake              720 ml   Output              975 ml   Net             -255 ml     Last stool: yest    Meds:    amiodarone 200 mg Oral Q24H   ampicillin 250 mg Oral Q6H   cefTRIAXone 1 g Intramuscular Q24H   heparin (porcine) 5,000 Units Subcutaneous Q12H   insulin lispro 0-9 Units Subcutaneous 4x Daily With Meals & Nightly   melatonin 5 mg Sublingual  Nightly   nicotine 1 patch Transdermal Daily   oxybutynin XL 10 mg Oral Daily   pantoprazole 40 mg Oral Q AM   QUEtiapine 50 mg Oral Nightly   sertraline 50 mg Oral Daily   terazosin 1 mg Oral Nightly     •  acetaminophen  •  bisacodyl  •  dextrose  •  glucagon (human recombinant)  •  LORazepam  •  Morphine  •  PHENobarbital  •  sodium chloride       Allergies   Allergen Reactions   • Beta Adrenergic Blockers    • Dipyridamole    • Dobutamine        ROS: limited  Gen: denies pain. Family relates that he occasionally mentions chest and b ack pain - received CPR.    Objective    Temp:  [97.1 °F (36.2 °C)-98.2 °F (36.8 °C)] 97.1 °F (36.2 °C)    COPY IN LABS and XRAYS    PE:  Gen: elderly wm lying in bed sleeping. Did rouse to verbal stimuli - more oriented than yesterday  Card:  HT distant. RRR. No edema  Pulm: resps unlabored, breath sounds w/o rhonchi or wheezes  GI: abd flat, soft, nl BS  : required I&O cath X1  Neuro: once awakened knew wife - could state they had been  62 years. Recognized dtr. Was very cooperative. Once dozing off, he would act out chewing tobacco.  Psych: restless     Assessment & Plan:     Info: 87 yowf with ischemic CM/EF 15%, CKD stage IV, systolic heart failure, and mild to moderate dementia, now with delirum     Problem 1. Pain - back - MSK, s/p chest compressions and defib and decub - could be contributing to restlessness. Improved on OMS 2 mg  Plan: add OMS 2 mg at 2200. Leave prn for pain/soa/restlessness     Problem 2. Restlessness/delirium r/t dementia, hospitalization and decline. Improved on OMS. Qtc on 6/5 was 566 ms. Has Phenobarb to 90 mg IV q 8 h prn and hs Seroquel 50 mg.  Plan: continue as-is but add hs OMS  2mg with f/u 2 mg if restlessness persists     Problem 3. Urinary retention - I&O cath X 1.   Plan: Bladder scans for 2 more days      GOC: Has comfort measures and AND status. No further IVs per family request. Is completing courses of 3 antibiotics - numbers are  improving.Family continues to hope that pt will go to [facility] for rehab - but are realistic about his prognosis and may choose home with hospice if [facility] will  Not take him or he does not improve. They have promised him in the past that he wounld never go to a nursing home. Spoke with 2 dtrs and wife.     Time: 60 mins were spent reviewing chart, talking with staff, pt., family, examining the pt., decision making, placing orders and performing documentation.   Called dtr Lissa and discussed pt. And POC for 15 mins. Offered support

## 2017-06-16 NOTE — THERAPY TREATMENT NOTE
Acute Care - Physical Therapy Treatment Note  [facility]     Patient Name: José Miguel Mackey Jr.  : 1929  MRN: [id number]  Today's Date: 2017  Onset of Illness/Injury or Date of Surgery Date: 17  Date of Referral to PT: 17  Referring Physician: DO Fitz    Admit Date: 2017    Visit Dx:    ICD-10-CM ICD-9-CM   1. Cardiac arrest with ventricular fibrillation I46.9 427.5    I49.01 427.41   2. Acute UTI N39.0 599.0   3. Chronic systolic congestive heart failure I50.22 428.22     428.0   4. Sepsis, due to unspecified organism A41.9 038.9     995.91   5. Do-not-intubate resuscitation status Z66 V49.86   6. Impaired functional mobility, balance, gait, and endurance Z74.09 V49.89   7. Impaired mobility and ADLs Z74.09 799.89     Patient Active Problem List   Diagnosis   • Abdominal pain   • Anemia   • Ascites   • Benign essential hypertension   • Cardiomyopathy   • Chronic kidney disease, stage IV (severe)   • Confusional state   • Constipation   • Atherosclerosis of native coronary artery of native heart   • Dementia   • Depression   • Diabetes mellitus   • Edema   • Gastroesophageal reflux disease   • Gout   • Arthralgia of hip   • Hyperlipidemia   • Hypertension   • Knee pain   • Memory loss   • Osteoarthritis of knee   • Shortness of breath   • Sinus bradycardia   • Swelling of lower extremity   • Tremor   • Urinary tract infection   • Acute on chronic systolic congestive heart failure   • Dyslipidemia   • Mild dementia   • Right bundle branch block   • Weakness of both legs   • Chronic midline low back pain without sciatica   • Ventricular fibrillation   • Prolonged QT interval   • Decubitus ulcer of coccygeal region, stage 2 (POA)               Adult Rehabilitation Note       17 1300 06/15/17 0805       Rehab Assessment/Intervention    Discipline physical therapy assistant  -UD physical therapy assistant  -UD     Document Type therapy note (daily note)  -UD therapy note (daily  note)  -UD     Subjective Information agree to therapy;no complaints  -UD agree to therapy;complains of;weakness  -UD     Patient Effort, Rehab Treatment good  -UD good  -UD     Symptoms Noted During/After Treatment fatigue  -UD fatigue  -UD     Precautions/Limitations  fall precautions  -UD     Specific Treatment Considerations pt confused  -UD confusion  -UD     Recorded by [UD] Violeta Barriga PTA [UD] Violeta Barriga PTA     Vital Signs    O2 Delivery Post Treatment room air  -UD room air  -UD     Pre Patient Position Supine  -UD Sitting  -UD     Intra Patient Position Standing  -UD Standing  -UD     Post Patient Position Sitting  -UD Sitting  -UD     Recorded by [UD] Violeta Barriga PTA [UD] Violeta Barriga PTA     Pain Assessment    Pain Assessment No/denies pain  -UD No/denies pain  -UD     Cunningham-Queen FACES Pain Rating 0  -UD 0  -UD     Pain Score 0  -UD 0  -UD     Recorded by [UD] Violeta Barriga PTA [UD] Violeta Barriga PTA     Cognitive Assessment/Intervention    Orientation Status oriented to;person  -UD oriented to;person  -UD     Follows Commands/Answers Questions 75% of the time  -% of the time  -UD     Personal Safety Interventions  fall prevention program maintained  -UD     Recorded by [UD] Violeta Barriga PTA [UD] Violeta Barriga PTA     Bed Mobility, Assessment/Treatment    Bed Mob, Supine to Sit, Evangeline minimum assist (75% patient effort);verbal cues required  -UD not tested   up in chair  -UD     Recorded by [UD] Violeta Barriga PTA [UD] Violeta Barriga PTA     Transfer Assessment/Treatment    Transfers, Sit-Stand Evangeline contact guard assist;2 person assist required  -UD contact guard assist;2 person assist required  -UD     Transfers, Stand-Sit Evangeline contact guard assist;2 person assist required  -UD contact guard assist;2 person assist required  -UD     Transfers, Sit-Stand-Sit, Assist Device rolling walker  -UD rolling walker  -UD     Recorded by [UD] Violeta Barriga PTA [UD] Violeta Barriga  PTA     Gait Assessment/Treatment    Gait, [place] Level stand by assist;2 person assist required  -UD contact guard assist;2 person assist required  -UD     Gait, Assistive Device rolling walker  -UD rolling walker  -UD     Gait, Distance (Feet) 350  -  -UD     Gait, Gait Deviations step length decreased  -UD forward flexed posture  -UD     Gait, Safety Issues  step length decreased  -UD     Gait, Impairments strength decreased  -UD strength decreased  -UD     Gait, Comment --   needs lots of verbal cues  -UD      Recorded by [UD] Violeta Barriga PTA [UD] Violeta Barriga PTA     Therapy Exercises    Bilateral Lower Extremities AROM:;10 reps;sitting  -UD AROM:;10 reps;sitting  -UD     Bilateral Upper Extremity AROM:;10 reps;sitting  -UD AROM:;10 reps;sitting  -UD     Recorded by [UD] Violeta Barriga PTA [UD] Violeta Barriga PTA     Positioning and Restraints    Pre-Treatment Position in bed  -UD sitting in chair/recliner  -UD     Post Treatment Position chair  -UD chair  -UD     In Chair notified nsg;reclined;sitting;call light within reach;exit alarm on;with family/caregiver;legs elevated  -UD notified nsg;reclined;sitting;call light within reach;exit alarm on;with other staff;legs elevated  -UD     Recorded by [UD] Violeta Barriga PTA [UD] Violeta Barriga PTA       User Key  (r) = Recorded By, (t) = Taken By, (c) = Cosigned By    Initials Name Effective Dates    UD Violeta Barriga PTA 06/22/15 -                 IP PT Goals       06/16/17 1415 06/15/17 0838 06/12/17 1513    Bed Mobility PT LTG    Bed Mobility PT LTG, Date Goal Reviewed   06/12/17  -MJ    Bed Mobility PT LTG, Outcome goal ongoing  -UD goal ongoing  -UD goal ongoing  -MJ    Transfer Training PT LTG    Transfer Training PT  LTG, Date Goal Reviewed   06/12/17  -MJ    Transfer Training PT LTG, Outcome goal met  -UD goal ongoing  -UD goal ongoing  -MJ    Gait Training PT LTG    Gait Training Goal PT LTG, Date Goal Reviewed   06/12/17  -MJ    Gait Training  Goal PT LTG, Outcome  goal met  -UD goal partially met   achieved distance  -MJ      06/11/17 0922          Bed Mobility PT LTG    Bed Mobility PT LTG, Date Established 06/11/17  -SR      Bed Mobility PT LTG, Time to Achieve by discharge  -SR      Bed Mobility PT LTG, Activity Type all bed mobility  -SR      Bed Mobility PT LTG, [place] Level independent  -SR      Transfer Training PT LTG    Transfer Training PT LTG, Date Established 06/11/17  -SR      Transfer Training PT LTG, Time to Achieve by discharge  -SR      Transfer Training PT LTG, Activity Type all transfers  -SR      Transfer Training PT LTG, [place] Level contact guard assist  -SR      Transfer Training PT LTG, Assist Device walker, rolling  -SR      Gait Training PT LTG    Gait Training Goal PT LTG, Date Established 06/11/17  -SR      Gait Training Goal PT LTG, Time to Achieve by discharge  -SR      Gait Training Goal PT LTG, [place] Level contact guard assist  -SR      Gait Training Goal PT LTG, Assist Device walker, rolling  -SR      Gait Training Goal PT LTG, Distance to Achieve 150  -SR        User Key  (r) = Recorded By, (t) = Taken By, (c) = Cosigned By    Initials Name Provider Type    UD Violeta Barriga, PTA Physical Therapy Assistant    SR Kelsi Tobias, PT Physical Therapist    MJ Navjot Gifford, PT Physical Therapist          Physical Therapy Education     Title: PT OT SLP Therapies (Active)     Topic: Physical Therapy (Active)     Point: Mobility training (Active)    Learning Progress Summary    Learner Readiness Method Response Comment Documented by Status   Patient Acceptance E,D NR   06/16/17 1415 Active    Acceptance E,D DU,NR  UD 06/15/17 0838 Done    Acceptance E,D NR   06/12/17 1512 Active    Acceptance E,D NR   06/11/17 0921 Active   Family Acceptance E,D NR   06/16/17 1415 Active    Acceptance E,D VU   06/12/17 1513 Done               Point: Home exercise program (Active)    Learning Progress Summary    Learner  Readiness Method Response Comment Documented by Status   Patient Acceptance E,D NR   06/16/17 1415 Active    Acceptance E,D DU,NR   06/15/17 0838 Done   Family Acceptance E,D NR   06/16/17 1415 Active               Point: Body mechanics (Active)    Learning Progress Summary    Learner Readiness Method Response Comment Documented by Status   Patient Acceptance E,D NR   06/16/17 1415 Active    Acceptance E,D DU,NR   06/15/17 0838 Done    Acceptance E,D NR   06/12/17 1512 Active    Acceptance E,D NR   06/11/17 0921 Active   Family Acceptance E,D NR   06/16/17 1415 Active    Acceptance E,D VU   06/12/17 1513 Done               Point: Precautions (Active)    Learning Progress Summary    Learner Readiness Method Response Comment Documented by Status   Patient Acceptance E,D NR   06/16/17 1415 Active    Acceptance E,D DU,NR   06/15/17 0838 Done    Acceptance E,D NR   06/12/17 1512 Active    Acceptance E,D NR   06/11/17 0921 Active   Family Acceptance E,D NR   06/16/17 1415 Active    Acceptance E,D VU   06/12/17 1513 Done                      User Key     Initials Effective Dates Name Provider Type Discipline     06/22/15 -  Violeta Barriga, PTA Physical Therapy Assistant PT     06/19/15 -  Kelsi Tobias, PT Physical Therapist PT     03/14/16 -  Navjot Gifford, PT Physical Therapist PT                    PT Recommendation and Plan  Anticipated Discharge Disposition: inpatient rehabilitation facility  PT Frequency: daily, per priority policy  Plan of Care Review  Plan Of Care Reviewed With: patient  Progress: progress towards functional goals is fair  Outcome Summary/Follow up Plan: pt more alert,needs lots of verbal cues.pt walked 350 ft with walker.did exercises in chair,family present          Outcome Measures       06/16/17 1300 06/15/17 0805       How much help from another person do you currently need...    Turning from your back to your side while in flat bed without using bedrails? 3   -UD 3  -UD     Moving from lying on back to sitting on the side of a flat bed without bedrails? 3  -UD 3  -UD     Moving to and from a bed to a chair (including a wheelchair)? 3  -UD 3  -UD     Standing up from a chair using your arms (e.g., wheelchair, bedside chair)? 3  -UD 3  -UD     Climbing 3-5 steps with a railing? 2  -UD 2  -UD     To walk in hospital room? 3  -UD 3  -UD     AM-PAC 6 Clicks Score 17  -UD 17  -UD       User Key  (r) = Recorded By, (t) = Taken By, (c) = Cosigned By    Initials Name Provider Type    UD Violeta Barriga PTA Physical Therapy Assistant           Time Calculation:         PT Charges       06/16/17 1417          Time Calculation    PT Received On 06/16/17  -UD      PT Goal Re-Cert Due Date 06/21/17  -UD      Time Calculation- PT    Total Timed Code Minutes- PT 24 minute(s)  -UD        User Key  (r) = Recorded By, (t) = Taken By, (c) = Cosigned By    Initials Name Provider Type    UD Violeta Barriga PTA Physical Therapy Assistant          Therapy Charges for Today     Code Description Service Date Service Provider Modifiers Qty    55042556747 HC PT THER PROC EA 15 MIN 6/15/2017 Violeta Barriga, PTA GP 1    26644962939 HC GAIT TRAINING EA 15 MIN 6/15/2017 Violeta Barriga, PTA GP 1    37614346537 HC PT THER SUPP EA 15 MIN 6/15/2017 Violeta Barriga, PTA GP 1    25609542532 HC PT THER PROC EA 15 MIN 6/16/2017 Violeta Barriga, PTA GP 1    73645359064 HC GAIT TRAINING EA 15 MIN 6/16/2017 Violeta Barriga, PTA GP 1    41142290329 HC PT THER SUPP EA 15 MIN 6/16/2017 Violeta Barriga, PTA GP 1          PT G-Codes  Outcome Measure Options: AM-PAC 6 Clicks Basic Mobility (PT)    Violeta Barriga PTA  6/16/2017

## 2017-06-16 NOTE — PLAN OF CARE
Problem: Patient Care Overview (Adult)  Goal: Plan of Care Review  Outcome: Ongoing (interventions implemented as appropriate)    06/15/17 1045   Coping/Psychosocial Response Interventions   Plan Of Care Reviewed With patient   Patient Care Overview   Progress no change   Outcome Evaluation   Outcome Summary/Follow up Plan pt is agitated and fall risk. pt refuses to take anything for pain po despite having some hip/leg discomfort

## 2017-06-17 NOTE — PROGRESS NOTES
[facility] Medicine Services  INPATIENT PROGRESS NOTE    Date of Admission: 6/5/2017  Length of Stay: 12  Primary Care Physician: No Known Provider    Subjective   CC: confusion  Subjective:  Patient is resting in chair drowsy but arousable and comfortable.  Wife and daughter at the bedside.  He is more alert today and answers my questions appropriately.  Tells me he does not have any chest pain or shortness of breath.         Review of Systems   Unable to obtain ROS 2nd to AMS    Objective      Temp:  [97.6 °F (36.4 °C)-98 °F (36.7 °C)] 97.8 °F (36.6 °C)  Heart Rate:  [69-75] 72  Resp:  [16-20] 20  BP: (122-136)/(71-87) 129/79     Physical Exam  Constitutional: frail appearing elderly man, restless, appears in NAD  Neck: supple, trachea midline  Respiratory: Clear to auscultation bilaterally without wheezes, rales or rhonchi. Nonlabored respirations   Cardiovascular: RRR, no murmur  Gastrointestinal: Positive bowel sounds, soft, nondistended  Musculoskeletal: No bilateral ankle edema, no clubbing or cyanosis to bilateral lower extremities.   Psychiatric: confused, restless, agitated at times   Neurologic: alert and oriented x 1, follows some commands and answers some questions appropriately  Skin: Right distal UE erythema and edema without fluctuance or discrete mass.     Results Review:    I have reviewed the labs, radiology results and diagnostic studies.    Results from last 7 days  Lab Units 06/16/17  1132 06/15/17  2338 06/13/17  0414   WBC 10*3/mm3 7.52 9.36 8.60   HEMOGLOBIN g/dL 13.3 11.8* 12.0*   HEMATOCRIT % 43.4 37.7* 38.2*   PLATELETS 10*3/mm3 203 231 229       Results from last 7 days  Lab Units 06/16/17  1132 06/13/17  0414   SODIUM mmol/L 140 139   POTASSIUM mmol/L 4.5 3.8   CHLORIDE mmol/L 109 106   TOTAL CO2 mmol/L 22.0 24.0   BUN mg/dL 21 26*   CREATININE mg/dL 1.50* 1.60*   GLUCOSE mg/dL 122* 132*   CALCIUM mg/dL 10.1 10.2      132 (H) 174 (H)R 197 (H)R 127R 161 (H)        Cultures:  Blood Culture   Date Value Ref Range Status   06/05/2017 Streptococcus, Beta Hemolytic, Group C (A)  Preliminary   06/05/2017 Streptococcus, Beta Hemolytic, Group C (A)  Preliminary     Comment:     SEE CULTURE [id number] FOR SUSCEPTIBILITIES     Urine Culture   Date Value Ref Range Status   06/05/2017 30,000-40,000 CFU/mL Enterococcus species (A)  Preliminary     Echocardiogram 6/6/17  · Left ventricular systolic function is severely decreased. Estimated EF = 15%.  · At least Mild mitral valve regurgitation is present  · Moderate tricuspid valve regurgitation is present.  · Mild aortic valve regurgitation is present.  · Calculated right ventricular systolic pressure from tricuspid regurgitation is 49 mmHg.  Imaging Results (last 24 hours)     ** No results found for the last 24 hours. **          I have reviewed the medications.    Assessment/Plan     Active Problems:    Cardiomyopathy    Chronic kidney disease, stage IV (severe)    Atherosclerosis of native coronary artery of native heart    Diabetes mellitus    Gastroesophageal reflux disease    Gout    Hyperlipidemia    Hypertension    Urinary tract infection    Acute on chronic systolic congestive heart failure    Mild dementia    Right bundle branch block    Chronic midline low back pain without sciatica    Prolonged QT interval    Decubitus ulcer of coccygeal region, stage 2 (POA)    Acute strep bacteremia   - 3/4 bottles (+) with group C strep.   - Repeat blood cultures on 6/8 with NGTD  - Dr. Valdez of [facility] following, he does suspect a pathogen. 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 not present on the exam of 03/05/2014. There is no  acute finding.  -Noted hospice consult by palliative     Cardiac arrest with ventricular fibrillation   - Per cardiology. 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     Systolic heart failure  - Appears to be compensated. Poor EF of 15%  - Cardiology following. Amiodarone only secondary to trifascicular block  - AND status, Palliative care following    DM II   - 4/6/17 A1c 6.1%, good control.   - BG up likely r/t stress, requires minimal SSI  - PO intake decreased    Urinary retention/probable BPH  - Ditropan  - Consider urology follow up at discharge.     CKD 4  -Cr stable    Hx of CABG    Generalized Weakness  -PT/OT consulted, recommending IPPT  CM working with family and has applied to [facility], family also interested in [place]    Intermittent Urinary retention  -if requires I/O again may need luong for comfort but will likely require restraint    CODE STATUS: AND  DVT prophylaxis:  SC Heparin  Discharge Planning:  No longer requiring sitter and participating in therapy.  [facility] to re-evaluate referral.   Plan to rehab when bed available.        Dereck Rosado MD   06/17/17   3:14 PM

## 2017-06-17 NOTE — PLAN OF CARE
Problem: Patient Care Overview (Adult)  Goal: Plan of Care Review  Outcome: Ongoing (interventions implemented as appropriate)    06/17/17 1530   Coping/Psychosocial Response Interventions   Plan Of Care Reviewed With patient   Patient Care Overview   Progress progress towards functional goals is fair   Outcome Evaluation   Outcome Summary/Follow up Plan Pt with good effort but required increased assist with bed mobility transfers and UE ther ex d/t lethargy and confusion.          Problem: Inpatient Occupational Therapy  Goal: Bed Mobility Goal LTG- OT  Outcome: Ongoing (interventions implemented as appropriate)    06/11/17 0940 06/17/17 1530   Bed Mobility OT LTG   Bed Mobility OT LTG, Date Established 06/11/17 --    Bed Mobility OT LTG, Time to Achieve by discharge --    Bed Mobility OT LTG, Activity Type supine to sit/sit to supine --    Bed Mobility OT LTG, [place] Level minimum assist (75% patient effort) --    Bed Mobility OT LTG, Assist Device bed rails --    Bed Mobility OT LTG, Outcome --  goal ongoing       Goal: Strength Goal LTG- OT  Outcome: Ongoing (interventions implemented as appropriate)    06/11/17 0940 06/17/17 1530   Strength OT LTG   Strength Goal OT LTG, Date Established 06/11/17 --    Strength Goal OT LTG, Time to Achieve by discharge --    Strength Goal OT LTG, Measure to Achieve Pt will increase BUE strenghth 1 MMG as needed to support ADLs --    Strength Goal OT LTG, Outcome --  goal ongoing       Goal: LB Dressing Goal LTG- OT  Outcome: Ongoing (interventions implemented as appropriate)    06/11/17 0940 06/17/17 1530   LB Dressing OT LTG   LB Dressing Goal OT LTG, Date Established 06/11/17 --    LB Dressing Goal OT LTG, Time to Achieve by discharge --    LB Dressing Goal OT LTG, [place] Level contact guard assist --    LB Dressing Goal OT LTG, Outcome --  goal ongoing       Goal: Functional Mobility Goal LTG- OT  Outcome: Ongoing (interventions implemented as appropriate)     06/11/17 0940 06/17/17 1530   Functional Mobility OT LTG   Functional Mobility Goal OT LTG, Date Established 06/11/17 --    Functional Mobility Goal OT LTG, Time to Achieve by discharge --    Functional Mobility Goal OT LTG, [place] Level contact guard --    Functional Mobility Goal OT LTG, Assist Device rolling walker --    Functional Mobility Goal OT LTG, Distance to Achieve in hallway;to the bathroom --    Functional Mobility Goal OT LTG, Outcome --  goal ongoing

## 2017-06-17 NOTE — THERAPY TREATMENT NOTE
Acute Care - Occupational Therapy Treatment Note  [facility]     Patient Name: José Miguel Mackey Jr.  : 1929  MRN: [id number]  Today's Date: 2017  Onset of Illness/Injury or Date of Surgery Date: 17  Date of Referral to OT: 06/10/17  Referring Physician: DO Fitz      Admit Date: 2017    Visit Dx:     ICD-10-CM ICD-9-CM   1. Cardiac arrest with ventricular fibrillation I46.9 427.5    I49.01 427.41   2. Acute UTI N39.0 599.0   3. Chronic systolic congestive heart failure I50.22 428.22     428.0   4. Sepsis, due to unspecified organism A41.9 038.9     995.91   5. Do-not-intubate resuscitation status Z66 V49.86   6. Impaired functional mobility, balance, gait, and endurance Z74.09 V49.89   7. Impaired mobility and ADLs Z74.09 799.89     Patient Active Problem List   Diagnosis   • Abdominal pain   • Anemia   • Ascites   • Benign essential hypertension   • Cardiomyopathy   • Chronic kidney disease, stage IV (severe)   • Confusional state   • Constipation   • Atherosclerosis of native coronary artery of native heart   • Dementia   • Depression   • Diabetes mellitus   • Edema   • Gastroesophageal reflux disease   • Gout   • Arthralgia of hip   • Hyperlipidemia   • Hypertension   • Knee pain   • Memory loss   • Osteoarthritis of knee   • Shortness of breath   • Sinus bradycardia   • Swelling of lower extremity   • Tremor   • Urinary tract infection   • Acute on chronic systolic congestive heart failure   • Dyslipidemia   • Mild dementia   • Right bundle branch block   • Weakness of both legs   • Chronic midline low back pain without sciatica   • Ventricular fibrillation   • Prolonged QT interval   • Decubitus ulcer of coccygeal region, stage 2 (POA)             Adult Rehabilitation Note       17 1345 17 1300 06/15/17 0805    Rehab Assessment/Intervention    Discipline occupational therapist  -AC physical therapy assistant  -UD physical therapy assistant  -UD    Document Type therapy  note (daily note)  -AC therapy note (daily note)  -UD therapy note (daily note)  -UD    Subjective Information agree to therapy;complains of;fatigue  -AC agree to therapy;no complaints  -UD agree to therapy;complains of;weakness  -UD    Patient Effort, Rehab Treatment good   pt lethargic  -AC good  -UD good  -UD    Symptoms Noted During/After Treatment fatigue  -AC fatigue  -UD fatigue  -UD    Precautions/Limitations fall precautions   exit alarm  -AC  fall precautions  -UD    Specific Treatment Considerations  pt confused  -UD confusion  -UD    Recorded by [AC] Sabrina Morales, OT [UD] Violeta Barriga, PTA [UD] Violeta Barriga, PTA    Vital Signs    O2 Delivery Post Treatment  room air  -UD room air  -UD    Pre Patient Position  Supine  -UD Sitting  -UD    Intra Patient Position  Standing  -UD Standing  -UD    Post Patient Position  Sitting  -UD Sitting  -UD    Recorded by  [UD] Violeta Barriga, PTA [UD] Violeta Barriga, PTA    Pain Assessment    Pain Assessment No/denies pain  -AC No/denies pain  -UD No/denies pain  -UD    [place] FACES Pain Rating  0  -UD 0  -UD    Pain Score  0  -UD 0  -UD    Recorded by [AC] Sabrina Morales, OT [UD] Violeta Barriga, PTA [UD] Violeta Barriga, PTA    Cognitive Assessment/Intervention    Current Cognitive/Communication Assessment impaired  -AC      Orientation Status oriented to;person  -AC oriented to;person  -UD oriented to;person  -UD    Follows Commands/Answers Questions 50% of the time;needs cueing;needs repetition  -AC 75% of the time  -% of the time  -UD    Personal Safety moderate impairment  -AC      Personal Safety Interventions fall prevention program maintained;gait belt;nonskid shoes/slippers when out of bed  -AC  fall prevention program maintained  -UD    Recorded by [AC] Sabrina Morales, OT [UD] Violeta Barriga, PTA [UD] Violeta Barriga, PTA    Bed Mobility, Assessment/Treatment    Bed Mobility, Assistive Device bed rails;draw sheet  -AC      Bed Mob, Supine to Sit, [place] verbal  cues required;moderate assist (50% patient effort)  -AC minimum assist (75% patient effort);verbal cues required  -UD not tested   up in chair  -UD    Bed Mobility, Impairments strength decreased;impaired balance   very lethargic  -AC      Recorded by [AC] Sabrina Morales, OT [UD] Violeta Barriga, BLUE [UD] Violeta Barriga PTA    Transfer Assessment/Treatment    Transfers, Sit-Stand [place] verbal cues required;minimum assist (75% patient effort)  -AC contact guard assist;2 person assist required  -UD contact guard assist;2 person assist required  -UD    Transfers, Stand-Sit [place] verbal cues required;minimum assist (75% patient effort)  -AC contact guard assist;2 person assist required  -UD contact guard assist;2 person assist required  -UD    Transfers, Sit-Stand-Sit, Assist Device rolling walker  -AC rolling walker  -UD rolling walker  -UD    Transfer, Impairments strength decreased;impaired balance  -AC      Recorded by [AC] Sabrina Morales, OT [UD] Violeta Barriga, BLUE [UD] Violeta Barriga PTA    Gait Assessment/Treatment    Gait, [place] Level  stand by assist;2 person assist required  -UD contact guard assist;2 person assist required  -UD    Gait, Assistive Device  rolling walker  -UD rolling walker  -UD    Gait, Distance (Feet)  350  -  -UD    Gait, Gait Deviations  step length decreased  -UD forward flexed posture  -UD    Gait, Safety Issues   step length decreased  -UD    Gait, Impairments  strength decreased  -UD strength decreased  -UD    Gait, Comment  --   needs lots of verbal cues  -UD     Recorded by  [UD] Violeta Barriga, PTA [UD] Violeta Barriga PTA    Functional Mobility    Functional Mobility- Ind. Level verbal cues required;minimum assist (75% patient effort)  -AC      Functional Mobility- Device rolling walker  -AC      Functional Mobility-Distance (Feet) 350  -AC      Functional Mobility- Comment followed with chair; assist with walker during turns  -AC      Recorded by [AC] Sabrina Morales,  OT      Therapy Exercises    Bilateral Lower Extremities  AROM:;10 reps;sitting  -UD AROM:;10 reps;sitting  -UD    Bilateral Upper Extremity AAROM:;10 reps;elbow flexion/extension;shoulder extension/flexion  -AC AROM:;10 reps;sitting  -UD AROM:;10 reps;sitting  -UD    Recorded by [AC] Sabrina Morales, REECE [UD] Violeta Barirga PTA [UD] Violeta Barriga PTA    Positioning and Restraints    Pre-Treatment Position in bed  -AC in bed  -UD sitting in chair/recliner  -UD    Post Treatment Position chair  -AC chair  -UD chair  -UD    In Chair reclined;call light within reach;encouraged to call for assist;exit alarm on;with family/caregiver;with nsg  -AC notified nsg;reclined;sitting;call light within reach;exit alarm on;with family/caregiver;legs elevated  -UD notified nsg;reclined;sitting;call light within reach;exit alarm on;with other staff;legs elevated  -UD    Recorded by [AC] Sabrina Morales, OT [UD] Violeta Barriga, BLUE [UD] Violeta Barriga PTA      User Key  (r) = Recorded By, (t) = Taken By, (c) = Cosigned By    Initials Name Effective Dates     Sabrina Morales OT 06/23/15 -     UD Violeta Barriga PTA 06/22/15 -                 OT Goals       06/17/17 1530 06/11/17 0940       Bed Mobility OT LTG    Bed Mobility OT LTG, Date Established  06/11/17  -     Bed Mobility OT LTG, Time to Achieve  by discharge  -     Bed Mobility OT LTG, Activity Type  supine to sit/sit to supine  -     Bed Mobility OT LTG, [place] Level  minimum assist (75% patient effort)  -     Bed Mobility OT LTG, Assist Device  bed rails  -     Bed Mobility OT LTG, Outcome goal ongoing  -AC      Strength OT LTG    Strength Goal OT LTG, Date Established  06/11/17  -     Strength Goal OT LTG, Time to Achieve  by discharge  -     Strength Goal OT LTG, Measure to Achieve  Pt will increase BUE strenghth 1 MMG as needed to support ADLs  -     Strength Goal OT LTG, Outcome goal ongoing  -AC      LB Dressing OT LTG    LB Dressing Goal OT LTG, Date  Established  06/11/17  -     LB Dressing Goal OT LTG, Time to Achieve  by discharge  -AC     LB Dressing Goal OT LTG, [place] Level  contact guard assist  -AC     LB Dressing Goal OT LTG, Outcome goal ongoing  -AC      Functional Mobility OT LTG    Functional Mobility Goal OT LTG, Date Established  06/11/17  -     Functional Mobility Goal OT LTG, Time to Achieve  by discharge  -AC     Functional Mobility Goal OT LTG, [place] Level  contact guard  -AC     Functional Mobility Goal OT LTG, Assist Device  rolling walker  -AC     Functional Mobility Goal OT LTG, Distance to Achieve  in hallway;to the bathroom  -AC     Functional Mobility Goal OT LTG, Outcome goal ongoing  -AC        User Key  (r) = Recorded By, (t) = Taken By, (c) = Cosigned By    Initials Name Provider Type    AC Sabrina Morales OT Occupational Therapist          Occupational Therapy Education     Title: PT OT SLP Therapies (Active)     Topic: Occupational Therapy (Active)     Point: ADL training (Active)    Description: Instruct learner(s) on proper safety adaptation and remediation techniques during self care or transfers.   Instruct in proper use of assistive devices.    Learning Progress Summary    Learner Readiness Method Response Comment Documented by Status   Patient Acceptance E NR safety with use of walker  06/17/17 1529 Active    Acceptance E VU Benefits of activity, role of OT  06/11/17 0939 Done   Family Acceptance E VU Benefits of activity, role of OT  06/11/17 0939 Done                      User Key     Initials Effective Dates Name Provider Type [facility] 06/23/15 -  Sabrina Morales OT Occupational Therapist OT                  OT Recommendation and Plan  Anticipated Discharge Disposition: inpatient rehabilitation facility  Planned Therapy Interventions: ADL retraining, balance training, bed mobility training, strengthening, transfer training  Therapy Frequency: daily  Plan of Care Review  Plan Of Care Reviewed  With: patient  Progress: progress towards functional goals is fair  Outcome Summary/Follow up Plan: Pt with good effort but required increased assist with bed mobility transfers and UE ther ex d/t lethargy and confusion.         Outcome Measures       06/17/17 1345 06/16/17 1300 06/15/17 0805    How much help from another person do you currently need...    Turning from your back to your side while in flat bed without using bedrails?  3  -UD 3  -UD    Moving from lying on back to sitting on the side of a flat bed without bedrails?  3  -UD 3  -UD    Moving to and from a bed to a chair (including a wheelchair)?  3  -UD 3  -UD    Standing up from a chair using your arms (e.g., wheelchair, bedside chair)?  3  -UD 3  -UD    Climbing 3-5 steps with a railing?  2  -UD 2  -UD    To walk in hospital room?  3  -UD 3  -UD    AM-PAC 6 Clicks Score  17  -UD 17  -UD    How much help from another is currently needed...    Putting on and taking off regular lower body clothing? 2  -AC      Bathing (including washing, rinsing, and drying) 2  -AC      Toileting (which includes using toilet bed pan or urinal) 2  -AC      Putting on and taking off regular upper body clothing 2  -AC      Taking care of personal grooming (such as brushing teeth) 3  -AC      Eating meals 3  -AC      Score 14  -AC      Functional Assessment    Outcome Measure Options AM-PAC 6 Clicks Daily Activity (OT)  -AC        User Key  (r) = Recorded By, (t) = Taken By, (c) = Cosigned By    Initials Name Provider Type     Sabrina Morales, OT Occupational Therapist    RUBI Barriga, PTA Physical Therapy Assistant           Time Calculation:         Time Calculation- OT       06/17/17 1533          Time Calculation- OT    OT Start Time 1345  -      Total Timed Code Minutes- OT 25 minute(s)  -      OT Received On 06/17/17  -      OT Goal Re-Cert Due Date 06/21/17  -        User Key  (r) = Recorded By, (t) = Taken By, (c) = Cosigned By    Initials Name Provider  Type    AC Sabrina Morales OT Occupational Therapist           Therapy Charges for Today     Code Description Service Date Service Provider Modifiers Qty    [id number]  OT THERAPEUTIC ACT EA 15 MIN 6/17/2017 Sabrina Morales OT GO 2               Sabrina Morales OT  6/17/2017

## 2017-06-17 NOTE — PLAN OF CARE
Problem: Patient Care Overview (Adult)  Goal: Plan of Care Review  Outcome: Ongoing (interventions implemented as appropriate)    06/17/17 1832   Coping/Psychosocial Response Interventions   Plan Of Care Reviewed With patient;daughter;spouse   Outcome Evaluation   Outcome Summary/Follow up Plan Pt sleepy and hard to arouse most of the day, ambulated with OT, and up in chair. Pt cont to have poor appitite and lethargic throughout the afternoon. Meds taken and patient more oriented to self, place and situation.         Problem: Pressure Ulcer (Adult)  Goal: Signs and Symptoms of Listed Potential Problems Will be Absent or Manageable (Pressure Ulcer)  Outcome: Ongoing (interventions implemented as appropriate)    06/17/17 1832   Pressure Ulcer   Problems Assessed (Pressure Ulcer) pain   Problems Present (Pressure Ulcer) none         Problem: Arrhythmia/Dysrhythmia (Symptomatic) (Adult)  Goal: Signs and Symptoms of Listed Potential Problems Will be Absent or Manageable (Arrhythmia/Dysrhythmia)  Outcome: Ongoing (interventions implemented as appropriate)    06/17/17 1832   Arrhythmia/Dysrhythmia (Symptomatic)   Problems Assessed (Arrhythmia/Dysrhythmia) hypoxia/hypoxemia;situational response   Problems Present (Arrhythmia/Dysrhythmia) situational response         Problem: Skin Integrity Impairment, Risk/Actual (Adult)  Goal: Identify Related Risk Factors and Signs and Symptoms  Outcome: Ongoing (interventions implemented as appropriate)    06/17/17 1832   Skin Integrity Impairment, Risk/Actual   Skin Integrity Impairment, Risk/Actual: Related Risk Factors age extremes;cognitive impairment;infection/disease process;moisture;immobility;sensory impairment       Goal: Skin Integrity/Wound Healing  Outcome: Ongoing (interventions implemented as appropriate)    06/17/17 1832   Skin Integrity Impairment, Risk/Actual (Adult)   Skin Integrity/Wound Healing making progress toward outcome         Problem: Fall Risk (Adult)  Goal:  Identify Related Risk Factors and Signs and Symptoms  Outcome: Ongoing (interventions implemented as appropriate)    06/17/17 1832   Fall Risk   Fall Risk: Related Risk Factors age-related changes;gait/mobility problems;confusion/agitation;fatigue/slow reaction;sensory deficits;environment unfamiliar;sleep pattern alteration   Fall Risk: Signs and Symptoms presence of risk factors       Goal: Absence of Falls  Outcome: Ongoing (interventions implemented as appropriate)    06/17/17 1832   Fall Risk (Adult)   Absence of Falls making progress toward outcome

## 2017-06-17 NOTE — PLAN OF CARE
Problem: Patient Care Overview (Adult)  Goal: Plan of Care Review  Outcome: Ongoing (interventions implemented as appropriate)    06/17/17 0950   Patient Care Overview   Progress declining   Outcome Evaluation   Outcome Summary/Follow up Plan continue current level of care/ abx and symptom management. [facility] vs hospice

## 2017-06-18 NOTE — PLAN OF CARE
Problem: Patient Care Overview (Adult)  Goal: Plan of Care Review  Outcome: Ongoing (interventions implemented as appropriate)    06/18/17 1035   Coping/Psychosocial Response Interventions   Plan Of Care Reviewed With spouse;daughter   Patient Care Overview   Progress declining   Outcome Evaluation   Outcome Summary/Follow up Plan continue supportive care. meeting with hospice tomorrow. still requires 24 hr sitter. fall risk

## 2017-06-18 NOTE — PROGRESS NOTES
[facility] Medicine Services  INPATIENT PROGRESS NOTE    Date of Admission: 6/5/2017  Length of Stay: 13  Primary Care Physician: No Known Provider    Subjective   CC: confusion  Subjective:  Patient resting in bed- confused conversation. Daughter at bedside. States was very restless throughout night. Not aggressive but trouble with sleep. Concerned that he has not rested much during last 2 weeks. Over all no significant improvement to mental status.     Review of Systems   Unable to obtain ROS 2nd to AMS    Objective      Temp:  [97.6 °F (36.4 °C)-98.6 °F (37 °C)] 97.6 °F (36.4 °C)  Heart Rate:  [69-77] 77  Resp:  [18-20] 18  BP: (124-135)/(67-79) 124/76     Physical Exam  Constitutional: frail appearing elderly man, restless, appears in NAD  Neck: supple, trachea midline  Respiratory: Clear to auscultation bilaterally without wheezes, rales or rhonchi. Nonlabored respirations   Cardiovascular: RRR, no murmur  Gastrointestinal: Positive bowel sounds, soft, nondistended  Musculoskeletal: No bilateral ankle edema, no clubbing or cyanosis to bilateral lower extremities.   Psychiatric: confused, restless, pleasant and cooperative   Neurologic: alert and oriented x 1, follows some commands and answers some questions appropriately  Skin: Right distal UE erythema and edema without fluctuance or discrete mass. Upper midline buttock redness, no erythema without fluctuance    Results Review:    I have reviewed the labs, radiology results and diagnostic studies.    Results from last 7 days  Lab Units 06/16/17  1132 06/15/17  2338 06/13/17  0414   WBC 10*3/mm3 7.52 9.36 8.60   HEMOGLOBIN g/dL 13.3 11.8* 12.0*   HEMATOCRIT % 43.4 37.7* 38.2*   PLATELETS 10*3/mm3 203 231 229       Results from last 7 days  Lab Units 06/16/17  1132 06/13/17  0414   SODIUM mmol/L 140 139   POTASSIUM mmol/L 4.5 3.8   CHLORIDE mmol/L 109 106   TOTAL CO2 mmol/L 22.0 24.0   BUN mg/dL 21 26*   CREATININE mg/dL 1.50* 1.60*    GLUCOSE mg/dL 122* 132*   CALCIUM mg/dL 10.1 10.2      132 (H) 174 (H)R 197 (H)R 127R 161 (H)       Cultures:  Blood Culture   Date Value Ref Range Status   06/05/2017 Streptococcus, Beta Hemolytic, Group C (A)  Preliminary   06/05/2017 Streptococcus, Beta Hemolytic, Group C (A)  Preliminary     Comment:     SEE CULTURE [id number] FOR SUSCEPTIBILITIES     Urine Culture   Date Value Ref Range Status   06/05/2017 30,000-40,000 CFU/mL Enterococcus species (A)  Preliminary     Echocardiogram 6/6/17  · Left ventricular systolic function is severely decreased. Estimated EF = 15%.  · At least Mild mitral valve regurgitation is present  · Moderate tricuspid valve regurgitation is present.  · Mild aortic valve regurgitation is present.  · Calculated right ventricular systolic pressure from tricuspid regurgitation is 49 mmHg.  Imaging Results (last 24 hours)     ** No results found for the last 24 hours. **          I have reviewed the medications.    Assessment/Plan     Active Problems:    Cardiomyopathy    Chronic kidney disease, stage IV (severe)    Atherosclerosis of native coronary artery of native heart    Diabetes mellitus    Gastroesophageal reflux disease    Gout    Hyperlipidemia    Hypertension    Urinary tract infection    Acute on chronic systolic congestive heart failure    Mild dementia    Right bundle branch block    Chronic midline low back pain without sciatica    Prolonged QT interval    Decubitus ulcer of coccygeal region, stage 2 (POA)    Acute strep bacteremia   - 3/4 bottles (+) with group C strep.   - Repeat blood cultures on 6/8 with [facility]  - Dr. Valdez of [facility] following, he does suspect a pathogen. Currently treating with IV Rocephin and PO Ampicillin  - PIV lost yesterday- no more lab work or IVs per family's wishes  - No vegetation on TTE     Acute R forearm and L buttock cellulitis   - Antibiotics per ID, appear to be resolving    Acute complicated UTI w/urinary frequency  - Urine culture (+) for  enterococcus  - ID managing antibiotics   - Consider urology follow up at [facility] for possible functional/anatomical abnormalities     Delirium with underlying dementia  - Delerium improving, agitation resolved and cooperative  - Continue low-dose Seroquel change to daily dosing and slight increase at night  - ativan, restoril prn qhs  - Appreciate Neurology recommendations   - CT of head from 6/15 --Reveals 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 not present on the exam of 03/05/2014. There is no  acute finding.  -- Palliative following. Family to meet with Hospice tomorrow at 1300     Cardiac arrest with ventricular fibrillation   - Per cardiology. 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     Systolic heart failure  - Appears to be compensated. Poor EF of 15%  - Cardiology following. Amiodarone only secondary to trifascicular block  - AND status    DM II   - 4/6/17 A1c 6.1%, good control.   - continue ssi    Urinary retention/probable BPH  - Ditropan  - Consider urology follow up at discharge.     CKD 4  -Cr stable    Hx of CABG    Generalized Weakness  -PT/OT consulted, recommending IPPT  CM working with family and has applied to [facility], family also interested in [facility]    Intermittent Urinary retention  -if requires I/O again may need luong for comfort but will likely require restraint    CODE STATUS: AND  DVT prophylaxis:  SC Heparin  Discharge Planning:  No longer requiring sitter and participating in therapy.  [facility] to re-evaluate referral.   Plan to rehab when bed available.        Gregoria Galeas, APRN   06/18/17   9:47 AM

## 2017-06-18 NOTE — PLAN OF CARE
Problem: Fall Risk (Adult)  Goal: Absence of Falls  Outcome: Ongoing (interventions implemented as appropriate)  Patient restless on and off throughout the night, trying to get up multiple times. Patient needs continual reorientation but pleasant and will take his meds. Encouraged fluids throughout the night.

## 2017-06-19 PROBLEM — I46.9 CARDIAC ARREST (HCC): Status: ACTIVE | Noted: 2017-01-01

## 2017-06-19 NOTE — PROGRESS NOTES
"José Miguel Mackey Jr.    Subjective     CC: delirium     History of Present Illness     José Miguel Mackey Jr. is followed with delirium. He is quite drowsy this morning. Per nursing, he slept well overnight.     There have been no other changes in the patient's interval history since my last note, 6/16/17.      The following portions of the patient's history were reviewed and updated as appropriate: allergies, current medications, past family history, past medical history, past social history, past surgical history and problem list.    Review of Systems   Unable to perform ROS: Other       Objective     /72 (BP Location: Left arm, Patient Position: Lying)  Pulse 54  Temp 97.4 °F (36.3 °C) (Axillary)   Resp 18  Ht 67\" (170.2 cm)  Wt 146 lb 3.2 oz (66.3 kg)  SpO2 99%  BMI 22.9 kg/m2    Physical Exam     Neurologic Exam     Mental Status   Level of consciousness: drowsy ,  unresponsive to painful stimuli       Unable to assess orientation, memory, attention or fund of knowledge due to depressed mental status.      Motor Exam   Muscle bulk: normal  Overall muscle tone: normal    Gait, Coordination, and Reflexes     Tremor   Resting tremor: absent        Assessment/Plan       José Miguel Mackey Jr. suffers from a delirium superimposed on his prior history of dementia. He will continue on his current medications unchanged for now. We continue to suspect that he will likely need at least subacute rehab placement upon discharge.      As part of this visit I reviewed records from the current hospitalization which is incorporated in the HPI.  "

## 2017-06-19 NOTE — PROGRESS NOTES
Adult Nutrition  Assessment/PES    Patient Name:  José Miguel Mackey Jr.  YOB: 1929  MRN: [id number]  Admit Date:  6/5/2017    Assessment Date:  6/19/2017        Reason for Assessment       06/19/17 1151    Reason for Assessment    Reason For Assessment/Visit follow up protocol    Time Spent (min) 20    Diagnosis --   per notes this adm    Neurological Other (comment)   delirium    Skin --   pressure ulcer- resolving   Active Problems:    Cardiomyopathy    Chronic kidney disease, stage IV (severe)    Atherosclerosis of native coronary artery of native heart    Diabetes mellitus    Gastroesophageal reflux disease    Gout    Hyperlipidemia    Hypertension    Urinary tract infection    Acute on chronic systolic congestive heart failure    Mild dementia    Right bundle branch block    Chronic midline low back pain without sciatica    Prolonged QT interval    Decubitus ulcer of coccygeal region, stage 2 (POA)             Nutrition/Diet History       06/19/17 1152    Nutrition/Diet History    Reported/Observed By Nursing Assistant;RN   pt asleep at time of RD visit    Other sitter reports pt sleeping/lethargic this AM, has not eaten breakfast tray. per RN- keep sending Boost Plus supplements. Noted family meeting with hospice todat at 1300. Per family- no more lab work or IVs              Labs/Tests/Procedures/Meds       06/19/17 1157    Labs/Tests/Procedures/Meds    Labs/Tests Review Reviewed                Nutrition Prescription Ordered       06/19/17 1157    Nutrition Prescription PO    Current PO Diet Regular    Supplement Boost Plus    Supplement Frequency 3 times a day    Common Modifiers Cardiac;Consistent Carbohydrate            Evaluation of Received Nutrient/Fluid Intake       06/19/17 1157    PO Evaluation    Number of Meals 7    % PO Intake 18              Problem/Interventions:          Problem 2       06/19/17 1158    Nutrition Diagnoses Problem 2    Problem 2 Inadequate Intake/Infusion     Inadequate Intake Type Oral    Etiology (related to) --   clinical condition    Signs/Symptoms (evidenced by) PO Intake    Percent (%) intake recorded 18 %    Over number of meals 7                  Intervention Goal       06/19/17 1158    Intervention Goal    General --   POC/GOC pending, family to meet with hospice today            Nutrition Intervention       06/19/17 1204    Nutrition Intervention    RD/Tech Action Follow Tx progress;Care plan reviewd              Education/Evaluation       06/19/17 1205    Monitor/Evaluation    Monitor Per protocol   POC/GOC          Electronically signed by:  Pricilla Perry MS RD/LD CNSC  06/19/17 12:05 PM

## 2017-06-19 NOTE — PLAN OF CARE
Problem: Patient Care Overview (Adult)  Goal: Plan of Care Review  Outcome: Ongoing (interventions implemented as appropriate)    06/19/17 1414   Coping/Psychosocial Response Interventions   Plan Of Care Reviewed With patient   Patient Care Overview   Progress progress towards functional goals is fair   Outcome Evaluation   Outcome Summary/Follow up Plan pt more sleepy ,wants to keep eyes closed,did walk 200 ft.has sitter in room         Problem: Inpatient Physical Therapy  Goal: Bed Mobility Goal LTG- PT  Outcome: Ongoing (interventions implemented as appropriate)    06/11/17 0922 06/12/17 1513 06/19/17 1414   Bed Mobility PT LTG   Bed Mobility PT LTG, Date Established 06/11/17 --  --    Bed Mobility PT LTG, Time to Achieve by discharge --  --    Bed Mobility PT LTG, Activity Type all bed mobility --  --    Bed Mobility PT LTG, [place] Level independent --  --    Bed Mobility PT LTG, Date Goal Reviewed --  06/12/17 --    Bed Mobility PT LTG, Outcome --  --  goal ongoing

## 2017-06-19 NOTE — DISCHARGE PLACEMENT REQUEST
"José Miguel Woo Jr. (87 y.o. Male)     Date of Birth Social Security Number Address Home Phone MRN    12/19/1929  [address] [facility] [facility] [phone] [id number]    [facility] Mar[facility] Status          Holiness        Admission Date Admission Type Admitting Provider Attending Provider Department, Room/Bed    6/5/17 Emergency Samara Umana MD Mini, Jocelyn, MD [address], S[address]/1    Discharge Date Discharge Disposition Discharge Destination                      Attending Provider: Samara Umana MD     Allergies:  Beta Adrenergic Blockers, Dipyridamole, Dobutamine    Isolation:  None   Infection:  None   Code Status:  Conditional    Ht:  67\" (170.2 cm)   Wt:  146 lb 3.2 oz (66.3 kg)    Admission Cmt:  None   Principal Problem:  Cardiac arrest with ventricular fibrillation [I46.9,I49.01]                 Active Insurance as of 6/5/2017     Primary Coverage     Payor Plan Insurance Group Employer/Plan Group    MEDICARE MEDICARE A & B      Payor Plan Address Payor Plan Phone Number Effective From Effective To    [address] [phone] 12/1/1994     [place] [address]       Subscriber Name Subscriber Birth Date Member ID       JOSÉ MIGUEL WOO JR. 12/19/1929 [id number]           Secondary Coverage     Payor Plan Insurance Group Employer/Plan Group    [facility] SUPP [id number]     Payor Plan Address Payor Plan Phone Number Effective From Effective To    [address]  12/1/2016     [place] [address]       Subscriber Name Subscriber Birth Date Member ID       JOSÉ MIGUEL WOO JR. 12/19/1929 [id number]                 Emergency Contacts      (Rel.) Home Phone Work Phone Mobile Phone    Leonie Woo (Spouse) [phone] -- --            Emergency Contact Information     Name Relation Home Work Mobile    Leonie Woo Spouse [phone]            Insurance Information                MEDICARE/MEDICARE A & B Phone: [phone]    Subscriber: " José Miguel Mackey Jr. Subscriber#: [id number]    Group#:  Precert#:         [facility]/ANTHJAMES [facility] SUPP Phone:     Subscriber: José Miguel Mackey Jr. 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       PROBLEM LIST:  1. CHF:  a. On 03/24/2014, EF less than 20%, moderate MR, mild to moderate TR, RVSP 45-50.   b. Remote angioplasty and MI, greater than 20 years prior, data deficient, historical patient of Dr. Llamas.  c. BNP 5000, most recent 1100 as of April 2014 and 300 as of July 2014.   d. 2015 Pt deferred bi V ICD  2. Hypertension.   3. Dyslipidemia:  A. 3/16 147/124/67/60  4. Diabetes mellitus.   5. CKD, stage 4:  a. Creatinine 2.0.   b. Creatinine 2.2 as of 3/16.  6. Mild dementia.  7. Remote pelvic fracture with chronic hip pain.   8. Remote ankle surgery.   9. Abnormal EKG:  Right bundle branch block secondary to CT change, QRS of 490 milliseconds.        Allergies:  Beta adrenergic blockers; Dipyridamole; and Dobutamine    Home/Current Medications:    Amiodarone 200 mg by mouth daily  Aspirin 81 mg by mouth daily  Vitamin D 3 1000 units daily  Celexa 40 mg one half daily  Lasix 20 mg by mouth daily  Multivitamin daily  Fish oil 1000 mg daily  Prilosec 20 mg daily  Potassium 8 mEq by mouth daily  Pravastatin 20 mg grams by mouth daily  Spironolactone 25 mg by mouth daily.      History of present illness:      According to the family Mr. Mackey is not felt well since Mother's Day.  For the last several days he's been feeling somewhat hot and cold.  His daughter also states that he  "nearly passed out about a week ago.  This evening he complained of hurting all over and was brought to the emergency room.    In the emergency room the patient became unresponsive.  A monitor rhythm revealed ventricular fibrillation.  CPR was initiated and the patient had a single defibrillation at 200 J with conversion to sinus rhythm.  CPR was continued.  The patient had a brief 20 beat run of ventricular fibrillation treated with a bolus of amiodarone.  He reportedly did have a palpable pulse throughout this time.    He has been being admitted with probable sepsis and congestive heart failure    The patient will except defibrillations but does not want intubation or chest compressions.      Cardiac Risk Factors: Hypertension, hyperlipidemia, diabetes mellitus, known coronary artery disease and remote tobacco abuse with cessation at the age of 30.    Social History: The patient is .  He has 2 daughters as well as his wife accompany him in the emergency room.  He quit smoking at the age of 30.  He does not drink alcohol.    Family History: Notable for family history of presumably heart disease in his father who  at the age of 73 and his brother did die of heart disease at 73.  His mother lived to age 94 and his sister lived to the age 98.    Review of Systems  Pertinent positives are listed in the HPI.  All other systems reviewed are negative.         Objective     Vital Sign Min/Max for last 24 hours  Temp  Min: 98.8 °F (37.1 °C)  Max: 98.8 °F (37.1 °C)   BP  Min: 131/86  Max: 144/81   Pulse  Min: 73  Max: 108   Resp  Min: 18  Max: 18   SpO2  Min: 81 %  Max: 93 %   No Data Recorded   Weight  Min: 153 lb (69.4 kg)  Max: 153 lb (69.4 kg)     Flowsheet Rows         First Filed Value    Admission Height  67\" (170.2 cm) Documented at 2017    Admission Weight  153 lb (69.4 kg) Documented at 2017          Physical Exam:    General:Thin elderly white male somewhat agitated but in no acute " distress.  HEENT: The sclerae are anicteric.  The oropharynx is clear and moist  CV: Regular rate and rhythm without appreciable murmur gallop or rub heard.  No ectopy is noted at this time.  Resp: Coarse breath sounds in the bases but otherwise clear.  No rales or wheezes are heard.  GI: Soft and nontender.  Normal bowel sounds are heard.  No appreciable hepatosplenomegaly  : deferred  Musculoskeletal: No gross joint deformities are noted  Skin: Warm and dry  Neurologic: As above slightly agitated but predominantly because he states he has to urinate  Psychiatric: Appropriate mood and affect     EKG:  Normal sinus rhythm with a first-degree AV block and a wide right bundle branch block with a QRS duration of 212 ms.  The QTC is also prolonged at 545 ms    Echo:  20% as of 2014    Labs:      Results from last 7 days  Lab Units 06/05/17 1956   SODIUM mmol/L 140   POTASSIUM mmol/L 3.8   CHLORIDE mmol/L 102   TOTAL CO2 mmol/L 24.0   BUN mg/dL 19   CREATININE mg/dL 1.80*   CALCIUM mg/dL 10.2   BILIRUBIN mg/dL 0.8   ALK PHOS U/L 67   ALT (SGPT) U/L 8   AST (SGOT) U/L 23   GLUCOSE mg/dL 137*     @LABRCNTIP(wbc:3,hgb:3,hct:3,PLT:3,NEUTOPHILPCT:3;LYMPHOPCT:3,MONOPCT  )  Lab Results (last 24 hours)     Procedure Component Value Units Date/Time    CBC & Differential [[id number]] Collected:  06/05/17 1956    Specimen:  Blood Updated:  06/05/17 2011    Narrative:       The following orders were created for panel order CBC & Differential.  Procedure                               Abnormality         Status                     ---------                               -----------         ------                     CBC Auto Differential[[id number]]        Abnormal            Final result                 Please view results for these tests on the individual orders.    CBC Auto Differential [[id number]]  (Abnormal) Collected:  06/05/17 1956    Specimen:  Blood Updated:  06/05/17 2011     WBC 13.46 (H) 10*3/mm3      RBC 4.90 10*6/mm3       Hemoglobin 15.6 g/dL      Hematocrit 48.0 %      MCV 98.0 fL      MCH 31.8 (H) pg      MCHC 32.5 g/dL      RDW 13.6 %      RDW-SD 48.2 fl      MPV 11.6 fL      Platelets 157 10*3/mm3      Neutrophil % 83.3 (H) %      Lymphocyte % 7.0 (L) %      Monocyte % 9.2 %      Eosinophil % 0.2 %      Basophil % 0.1 %      Immature Grans % 0.2 %      Neutrophils, Absolute 11.20 (H) 10*3/mm3      Lymphocytes, Absolute 0.94 10*3/mm3      Monocytes, Absolute 1.24 (H) 10*3/mm3      Eosinophils, Absolute 0.03 (L) 10*3/mm3      Basophils, Absolute 0.02 10*3/mm3      Immature Grans, Absolute 0.03 10*3/mm3     Comprehensive Metabolic Panel [[id number]]  (Abnormal) Collected:  06/05/17 1956    Specimen:  Blood Updated:  06/05/17 2041     Glucose 137 (H) mg/dL      BUN 19 mg/dL      Creatinine 1.80 (H) mg/dL      Sodium 140 mmol/L      Potassium 3.8 mmol/L      Chloride 102 mmol/L      CO2 24.0 mmol/L      Calcium 10.2 mg/dL      Total Protein 7.3 g/dL      Albumin 4.50 g/dL      ALT (SGPT) 8 U/L      AST (SGOT) 23 U/L      Alkaline Phosphatase 67 U/L      Total Bilirubin 0.8 mg/dL      eGFR Non African Amer 36 (L) mL/min/1.73      Globulin 2.8 gm/dL      A/G Ratio 1.6 g/dL      BUN/Creatinine Ratio 10.6     Anion Gap 14.0 (H) mmol/L     Narrative:       National Kidney [employer] Guidelines    Stage     Description        GFR  1         Normal or High     90+  2         Mild decrease      60-89  3         Moderate decrease  30-59  4         Severe decrease    15-29  5         Kidney failure     <15    Lipase [[id number]]  (Normal) Collected:  06/05/17 1956    Specimen:  Blood Updated:  06/05/17 2041     Lipase 33 U/L     C-reactive Protein [[id number]]  (Abnormal) Collected:  06/05/17 1956    Specimen:  Blood Updated:  06/05/17 2052     C-Reactive Protein 12.50 (H) mg/dL     Blood Culture [[id number]] Collected:  06/05/17 2031    Specimen:  Blood from Arm, Left Updated:  06/05/17 2053    Blood Culture [[id number]] Collected:  06/05/17  2031    Specimen:  Blood from Arm, Right Updated:  06/05/17 2053    Troponin [[id number]]  (Abnormal) Collected:  06/05/17 1956    Specimen:  Blood Updated:  06/05/17 2053     Troponin I 0.494 (H) ng/mL     Narrative:       Ultra Troponin I Reference Range:    <=0.039 ng/mL: Negative  0.04-0.779 ng/mL: Indeterminate Range. Clinical correlation required.  >=0.78  ng/mL: Consistent with myocardial injury. Clinical correlation required.    Green Top (Gel) [[id number]] Collected:  06/05/17 1956    Specimen:  Blood Updated:  06/05/17 2101     Extra Tube Hold for add-ons.      Auto resulted.       Lavender Top [[id number]] Collected:  06/05/17 1956    Specimen:  Blood Updated:  06/05/17 2101     Extra Tube hold for add-on      Auto resulted       Gold Top - SST [[id number]] Collected:  06/05/17 1956    Specimen:  Blood Updated:  06/05/17 2101     Extra Tube Hold for add-ons.      Auto resulted.       Lactic Acid, Plasma [[id number]]  (Abnormal) Collected:  06/05/17 2031    Specimen:  Blood Updated:  06/05/17 2114     Lactate 2.5 (C) mmol/L       Falsely depressed results may occur on samples drawn from patients receiving N-Acetylcysteine (NAC) or Metamizole.       Protime-INR [[id number]] Collected:  06/05/17 2033    Specimen:  Blood Updated:  06/05/17 2118     Protime 11.5 Seconds      INR 1.05    Narrative:       Therapeutic Ranges for INR: 2.0-3.0 (PT 20-30)                              2.5-3.5 (PT 25-34)    Urine Culture [[id number]] Collected:  06/05/17 2103    Specimen:  Urine from Urine, Catheter Updated:  06/05/17 2127    Urinalysis With / Culture If Indicated [[id number]]  (Abnormal) Collected:  06/05/17 2103    Specimen:  Urine from Urine, Catheter Updated:  06/05/17 2129     Color, UA Yellow     Appearance, UA Clear     pH, UA 6.0     Specific Gravity, UA 1.015     Glucose, UA Negative     Ketones, UA Negative     Bilirubin, UA Negative     Blood, UA Moderate (2+) (A)     Protein, UA 30 mg/dL (1+) (A)     Leuk  Esterase, UA Small (1+) (A)     Nitrite, UA Negative     Urobilinogen, UA 0.2 E.U./dL    Urinalysis, Microscopic Only [[id number]]  (Abnormal) Collected:  06/05/17 2103    Specimen:  Urine from Urine, Catheter Updated:  06/05/17 2129     RBC, UA 0-2 /HPF      WBC, UA 21-30 (A) /HPF      Bacteria, UA None Seen /HPF      Squamous Epithelial Cells, UA 0-2 /HPF      Hyaline Casts, UA 0-6 /LPF      Methodology Automated Microscopy    Troponin [[id number]] Collected:  06/05/17 2157    Specimen:  Blood Updated:  06/05/17 2200    [place] Draw [[id number]] Collected:  06/05/17 1956    Specimen:  Blood Updated:  06/05/17 2201    Narrative:       The following orders were created for panel order [place] Draw.  Procedure                               Abnormality         Status                     ---------                               -----------         ------                     Light Blue Top[[id number]]                                   Final result               Green Top (Gel)[[id number]]                                  Final result               Lavender Top[[id number]]                                     Final result               Gold Top - SST[[id number]]                                   Final result               Green Top (No Gel)[[id number]]                                                            Please view results for these tests on the individual orders.    Light Blue Top [[id number]] Collected:  06/05/17 2033    Specimen:  Blood Updated:  06/05/17 2201     Extra Tube hold for add-on      Auto resulted       BNP [[id number]]  (Abnormal) Collected:  06/05/17 1956    Specimen:  Blood Updated:  06/05/17 2201     BNP 2636.0 (H) pg/mL     Blood Gas, Arterial [[id number]]  (Abnormal) Collected:  06/05/17 2155    Specimen:  Arterial Blood Updated:  06/05/17 2201     Site Arterial: right brachial     Ady's Test N/A     pH, Arterial 7.376 pH units      pCO2, Arterial 27.3 (L) mm Hg      pO2, Arterial 91.7 mm Hg      HCO3,  Arterial 16.0 (L) mmol/L      Base Excess, Arterial -8.2 (L) mmol/L      Hemoglobin, Blood Gas 14.2 g/dL      Hematocrit, Blood Gas 43.5 %      Oxyhemoglobin 95.5 %      Methemoglobin 1.0 %      Carboxyhemoglobin 0.8 %      CO2 Content 16.8 (L)     Barometric Pressure for Blood Gas -- mmHg       N/A        Modality non re-breather     FIO2 100 %         Lab Results   Component Value Date    TROPONINI 0.494 (H) 06/05/2017    TROPONINI 0.14 02/20/2014        Lab Results   Component Value Date    CHOL 141 04/06/2017    CHOL 138 11/22/2016     Lab Results   Component Value Date    HDL 62 (H) 04/06/2017    HDL 65 (H) 11/22/2016    HDL 67 (H) 03/14/2016     No results found for: LDLCALC  Lab Results   Component Value Date    TRIG 145 04/06/2017    TRIG 96 11/22/2016    TRIG 124 03/14/2016     No components found for: CHOLHDL  Lab Results   Component Value Date    INR 1.05 06/05/2017    INR 1.35 03/28/2014    INR 1.35 03/27/2014    PROTIME 11.5 06/05/2017    PROTIME 14.7 (H) 03/28/2014    PROTIME 14.7 (H) 03/27/2014       Ejection Fraction:  20%      Results Review:  I reviewed the patients new clinical results.      Assessment:    Ischemic cardiomyopathy   Ventricular fibrillation status post defibrillation June 5, 2017 in emergency room   Last LVEF 20% by echo March 24, 2014   Chronic amiodarone therapy  Congestive heart failure   BNP 2636   Chest x-ray shows pulmonary edema versus early infection infiltrate in the perihilar areas  Sepsis, probable UTI   Lactic acid 2.5   WBC 13,000  Hypertension  Dyslipidemia  Diabetes mellitus  CK D stage IV  Right bundle branch block      Plan:    Amiodarone drip  No Levaquin as the patient's QTC is already prolonged as it is QRS duration  Doxycycline and Zosyn will be administered  The patient will be admitted to ICU  Lidocaine could be used with an initial bolus of 70 mg with a subsequent drip of 1 mg/m if necessary for a short period time.  Caution should be used due to his renal  insufficiency.    I discussed the patients findings and my recommendations with patient and the family    Estee Echeverria MD  06/05/17  10:13 PM             Electronically signed by Estee Echeverria MD at 6/5/2017 10:55 PM

## 2017-06-19 NOTE — PROGRESS NOTES
Continued Stay Note  [facility]     Patient Name: José Miguel Mackey Jr.  MRN: [id number]  Today's Date: 6/19/2017    Admit Date: 6/5/2017          Discharge Plan       06/19/17 1505    Case Management/Social Work Plan    Plan [facility] inpatient hospice    Additional Comments Chart reviewed.  Met with pt and family.  Discussed inpt hospice.  Pt deemed inpt hospice appropriate per .  Spoke with  regarding this and she is agreeable with plan as well for hospice to take over pt's care.  Family would like to take pt to [facility] if/when his symptoms can be managed.  Paperwork signed with hospice social worker.  Jack Mae CM aware of plan.  Pt will receive general inpt hospice care for skilled nursing assessment and interventions for symptom mgmt of pain and agitation with scheduled and prn meds and frequent monitoring of these meds.  If hospice team can be of further assist, call ext [phone].               Discharge Codes     None        Expected Discharge Date and Time     Expected Discharge Date Expected Discharge Time    Jun 21, 2017             Maria Isabel Webb RN

## 2017-06-19 NOTE — SIGNIFICANT NOTE
Palliative Team Meeting Attendance  13:00                DO GUILLERMO Brooks, RN, CHPN              GUILLERMO Lieberman M.Div., [facility], [facility]              KAELYN Hillman RN, KEELY Truong, AN CORTES

## 2017-06-19 NOTE — PLAN OF CARE
Problem: Patient Care Overview (Adult)  Goal: Plan of Care Review  Outcome: Ongoing (interventions implemented as appropriate)    06/19/17 1255   Coping/Psychosocial Response Interventions   Plan Of Care Reviewed With spouse;daughter   Patient Care Overview   Progress declining   Outcome Evaluation   Outcome Summary/Follow up Plan meeting with hospice today. transition to inpt hospice

## 2017-06-19 NOTE — THERAPY TREATMENT NOTE
Acute Care - Physical Therapy Treatment Note  [facility]     Patient Name: José Miguel Mackey Jr.  : 1929  MRN: [id number]  Today's Date: 2017  Onset of Illness/Injury or Date of Surgery Date: 17  Date of Referral to PT: 17  Referring Physician: DO Fitz    Admit Date: 2017    Visit Dx:    ICD-10-CM ICD-9-CM   1. Cardiac arrest with ventricular fibrillation I46.9 427.5    I49.01 427.41   2. Acute UTI N39.0 599.0   3. Chronic systolic congestive heart failure I50.22 428.22     428.0   4. Sepsis, due to unspecified organism A41.9 038.9     995.91   5. Do-not-intubate resuscitation status Z66 V49.86   6. Impaired functional mobility, balance, gait, and endurance Z74.09 V49.89   7. Impaired mobility and ADLs Z74.09 799.89     Patient Active Problem List   Diagnosis   • Abdominal pain   • Anemia   • Ascites   • Benign essential hypertension   • Cardiomyopathy   • Chronic kidney disease, stage IV (severe)   • Confusional state   • Constipation   • Atherosclerosis of native coronary artery of native heart   • Dementia   • Depression   • Diabetes mellitus   • Edema   • Gastroesophageal reflux disease   • Gout   • Arthralgia of hip   • Hyperlipidemia   • Hypertension   • Knee pain   • Memory loss   • Osteoarthritis of knee   • Shortness of breath   • Sinus bradycardia   • Swelling of lower extremity   • Tremor   • Urinary tract infection   • Acute on chronic systolic congestive heart failure   • Dyslipidemia   • Mild dementia   • Right bundle branch block   • Weakness of both legs   • Chronic midline low back pain without sciatica   • Ventricular fibrillation   • Prolonged QT interval   • Decubitus ulcer of coccygeal region, stage 2 (POA)               Adult Rehabilitation Note       17 1300 17 1345       Rehab Assessment/Intervention    Discipline physical therapy assistant  -UD occupational therapist  -AC     Document Type therapy note (daily note)  -UD therapy note (daily note)   -AC     Subjective Information agree to therapy;no complaints  -UD agree to therapy;complains of;fatigue  -AC     Patient Effort, Rehab Treatment good  -UD good   pt lethargic  -AC     Symptoms Noted During/After Treatment fatigue  -UD fatigue  -AC     Precautions/Limitations  fall precautions   exit alarm  -AC     Specific Treatment Considerations pt very sleepy today  -UD      Recorded by [UD] Violeta Barriga PTA [AC] Sabrina Morales, OT     Vital Signs    O2 Delivery Post Treatment room air  -UD      Pre Patient Position Supine  -UD      Intra Patient Position Standing  -UD      Post Patient Position Supine  -UD      Recorded by [UD] Violeta Barriga PTA      Pain Assessment    Pain Assessment No/denies pain  -UD No/denies pain  -AC     Cunningham-Queen FACES Pain Rating 0  -UD      Pain Score 0  -UD      Pain Location Abdomen  -UD      Recorded by [UD] Violeta Barriga PTA [AC] Sabrina Morales, OT     Cognitive Assessment/Intervention    Current Cognitive/Communication Assessment  impaired  -AC     Orientation Status oriented to;person  -UD oriented to;person  -AC     Follows Commands/Answers Questions able to follow single-step instructions  -UD 50% of the time;needs cueing;needs repetition  -AC     Personal Safety  moderate impairment  -AC     Personal Safety Interventions  fall prevention program maintained;gait belt;nonskid shoes/slippers when out of bed  -AC     Recorded by [UD] Violeta Barriga PTA [AC] Sabrina Morales, OT     Bed Mobility, Assessment/Treatment    Bed Mobility, Assistive Device  bed rails;draw sheet  -AC     Bed Mob, Supine to Sit, [place] minimum assist (75% patient effort);verbal cues required  -UD verbal cues required;moderate assist (50% patient effort)  -AC     Bed Mob, Sit to Supine, [place] minimum assist (75% patient effort);2 person assist required;verbal cues required  -UD      Bed Mobility, Impairments  strength decreased;impaired balance   very lethargic  -AC     Recorded by [UD] Violeta  BLUE Barriga [AC] Sabrina Morales OT     Transfer Assessment/Treatment    Transfers, Sit-Stand [place] contact guard assist;2 person assist required  -UD verbal cues required;minimum assist (75% patient effort)  -AC     Transfers, Stand-Sit [place] contact guard assist;2 person assist required  -UD verbal cues required;minimum assist (75% patient effort)  -AC     Transfers, Sit-Stand-Sit, Assist Device rolling walker  -UD rolling walker  -AC     Toilet Transfer, [place] minimum assist (75% patient effort)  -UD      Toilet Transfer, Assistive Device rolling walker  -UD      Transfer, Impairments  strength decreased;impaired balance  -AC     Recorded by [UD] Violeta Barriga PTA [AC] Sabrina Morales OT     Gait Assessment/Treatment    Gait, [place] Level contact guard assist;2 person assist required  -UD      Gait, Assistive Device rolling walker  -UD      Gait, Distance (Feet) 200   1 sitting rest  -UD      Gait, Gait Deviations forward flexed posture;stride length decreased  -UD      Gait, Safety Issues step length decreased  -UD      Gait, Impairments strength decreased  -UD      Gait, Comment --   very sleepy today  -UD      Recorded by [UD] Violeta Barriga PTA      Functional Mobility    Functional Mobility- Ind. Level  verbal cues required;minimum assist (75% patient effort)  -AC     Functional Mobility- Device  rolling walker  -AC     Functional Mobility-Distance (Feet)  350  -AC     Functional Mobility- Comment  followed with chair; assist with walker during turns  -AC     Recorded by  [AC] Sabrina Morales OT     Therapy Exercises    Bilateral Upper Extremity  AAROM:;10 reps;elbow flexion/extension;shoulder extension/flexion  -AC     Recorded by  [AC] Sabrina Morales OT     Positioning and Restraints    Pre-Treatment Position in bed  -UD in bed  -AC     Post Treatment Position bed  -UD chair  -AC     In Bed notified nsg;supine;call light within reach;exit alarm on;with family/caregiver;side rails up  x2  -UD      In Chair  reclined;call light within reach;encouraged to call for assist;exit alarm on;with family/caregiver;with nsg  -AC     Recorded by [UD] Violeta Barriga, PTA [AC] Sabrina Morales OT       User Key  (r) = Recorded By, (t) = Taken By, (c) = Cosigned By    Initials Name Effective Dates    AC Sabrina Morales, OT 06/23/15 -     UD Violeta Barriga PTA 06/22/15 -                 IP PT Goals       06/19/17 1414 06/16/17 1415 06/15/17 0838    Bed Mobility PT LTG    Bed Mobility PT LTG, Outcome goal ongoing  -UD goal ongoing  -UD goal ongoing  -UD    Transfer Training PT LTG    Transfer Training PT LTG, Outcome  goal met  -UD goal ongoing  -UD    Gait Training PT LTG    Gait Training Goal PT LTG, Outcome   goal met  -UD      06/12/17 1513 06/11/17 0922       Bed Mobility PT LTG    Bed Mobility PT LTG, Date Established  06/11/17  -SR     Bed Mobility PT LTG, Time to Achieve  by discharge  -SR     Bed Mobility PT LTG, Activity Type  all bed mobility  -SR     Bed Mobility PT LTG, [place] Level  independent  -SR     Bed Mobility PT LTG, Date Goal Reviewed 06/12/17  -MJ      Bed Mobility PT LTG, Outcome goal ongoing  -MJ      Transfer Training PT LTG    Transfer Training PT LTG, Date Established  06/11/17  -SR     Transfer Training PT LTG, Time to Achieve  by discharge  -SR     Transfer Training PT LTG, Activity Type  all transfers  -SR     Transfer Training PT LTG, [place] Level  contact guard assist  -SR     Transfer Training PT LTG, Assist Device  walker, rolling  -SR     Transfer Training PT  LTG, Date Goal Reviewed 06/12/17  -MJ      Transfer Training PT LTG, Outcome goal ongoing  -MJ      Gait Training PT LTG    Gait Training Goal PT LTG, Date Established  06/11/17  -SR     Gait Training Goal PT LTG, Time to Achieve  by discharge  -SR     Gait Training Goal PT LTG, [place] Level  contact guard assist  -SR     Gait Training Goal PT LTG, Assist Device  walker, rolling  -SR     Gait Training Goal PT  LTG, Distance to Achieve  150  -SR     Gait Training Goal PT LTG, Date Goal Reviewed 06/12/17  -MJ      Gait Training Goal PT LTG, Outcome goal partially met   achieved distance  -MJ        User Key  (r) = Recorded By, (t) = Taken By, (c) = Cosigned By    Initials Name Provider Type    UD Violeta Barriga, PTA Physical Therapy Assistant    SR Kelsi Tobias, PT Physical Therapist    MJ Navjot Gifford, PT Physical Therapist          Physical Therapy Education     Title: PT OT SLP Therapies (Active)     Topic: Physical Therapy (Active)     Point: Mobility training (Active)    Learning Progress Summary    Learner Readiness Method Response Comment Documented by Status   Patient Acceptance E,D NR   06/19/17 1414 Active    Acceptance E,D NR   06/16/17 1415 Active    Acceptance E,D DU,NR   06/15/17 0838 Done    Acceptance E,D NR   06/12/17 1512 Active    Acceptance E,D NR   06/11/17 0921 Active   Family Acceptance E,D NR   06/16/17 1415 Active    Acceptance E,D VU   06/12/17 1513 Done               Point: Home exercise program (Active)    Learning Progress Summary    Learner Readiness Method Response Comment Documented by Status   Patient Acceptance E,D NR   06/19/17 1414 Active    Acceptance E,D NR   06/16/17 1415 Active    Acceptance E,D DU,NR   06/15/17 0838 Done   Family Acceptance E,D NR   06/16/17 1415 Active               Point: Body mechanics (Active)    Learning Progress Summary    Learner Readiness Method Response Comment Documented by Status   Patient Acceptance E,D NR   06/19/17 1414 Active    Acceptance E,D NR   06/16/17 1415 Active    Acceptance E,D DU,NR   06/15/17 0838 Done    Acceptance E,D NR   06/12/17 1512 Active    Acceptance E,D NR   06/11/17 0921 Active   Family Acceptance E,D NR   06/16/17 1415 Active    Acceptance E,D VU   06/12/17 1513 Done               Point: Precautions (Active)    Learning Progress Summary    Learner Readiness Method Response Comment Documented by  Status   Patient Acceptance E,D NR   06/19/17 1414 Active    Acceptance E,D NR   06/16/17 1415 Active    Acceptance E,D DU,NR   06/15/17 0838 Done    Acceptance E,D NR   06/12/17 1512 Active    Acceptance E,D NR   06/11/17 0921 Active   Family Acceptance E,D NR   06/16/17 1415 Active    Acceptance E,D VU   06/12/17 1513 Done                      User Key     Initials Effective Dates Name Provider Type Discipline     06/22/15 -  Violeta Barriga, PTA Physical Therapy Assistant PT     06/19/15 -  Kelsi Tobias, PT Physical Therapist PT     03/14/16 -  Navjot Gifford, PT Physical Therapist PT                    PT Recommendation and Plan  Anticipated Discharge Disposition: inpatient rehabilitation facility  PT Frequency: daily, per priority policy  Plan of Care Review  Plan Of Care Reviewed With: patient  Progress: progress towards functional goals is fair  Outcome Summary/Follow up Plan: pt more sleepy ,wants to keep eyes closed,did walk 200 ft.has sitter in room          Outcome Measures       06/19/17 [facility] 06/17/17 1345       How much help from another person do you currently need...    Turning from your back to your side while in flat bed without using bedrails? 3  -UD      Moving from lying on back to sitting on the side of a flat bed without bedrails? 3  -UD      Moving to and from a bed to a chair (including a wheelchair)? 2  -UD      Standing up from a chair using your arms (e.g., wheelchair, bedside chair)? 3  -UD      Climbing 3-5 steps with a railing? 2  -UD      To walk in hospital room? 3  -UD      AM-PAC 6 Clicks Score 16  -UD      How much help from another is currently needed...    Putting on and taking off regular lower body clothing?  2  -AC     Bathing (including washing, rinsing, and drying)  2  -AC     Toileting (which includes using toilet bed pan or urinal)  2  -AC     Putting on and taking off regular upper body clothing  2  -AC     Taking care of personal grooming (such as  brushing teeth)  3  -AC     Eating meals  3  -AC     Score  14  -AC     Functional Assessment    Outcome Measure Options  AM-PAC 6 Clicks Daily Activity (OT)  -AC       User Key  (r) = Recorded By, (t) = Taken By, (c) = Cosigned By    Initials Name Provider Type    AC Sabrina Morales, OT Occupational Therapist    UD Violeta Barriga PTA Physical Therapy Assistant           Time Calculation:         PT Charges       06/19/17 1415          Time Calculation    PT Received On 06/19/17  -      PT Goal Re-Cert Due Date 06/21/17  -      Time Calculation- PT    Total Timed Code Minutes- PT 20 minute(s)  -        User Key  (r) = Recorded By, (t) = Taken By, (c) = Cosigned By    Initials Name Provider Type    RUBI Barriga PTA Physical Therapy Assistant          Therapy Charges for Today     Code Description Service Date Service Provider Modifiers Qty    04196956378 HC GAIT TRAINING EA 15 MIN 6/19/2017 Violeta Barriga PTA GP 1    94728125945 HC PT THER SUPP EA 15 MIN 6/19/2017 Violeta Barriga PTA GP 1          PT G-Codes  Outcome Measure Options: AM-PAC 6 Clicks Daily Activity (OT)    Violeta Barriga PTA  6/19/2017

## 2017-06-21 NOTE — THERAPY DISCHARGE NOTE
Acute Care - Physical Therapy Discharge Summary  [facility]       Patient Name: José Miguel Mackey Jr.  : 1929  MRN: [id number]    Today's Date: 2017  Onset of Illness/Injury or Date of Surgery Date: 17    Date of Referral to PT: 17  Referring Physician: DO Fitz      Admit Date: 2017      PT Recommendation and Plan    Visit Dx:    ICD-10-CM ICD-9-CM   1. Cardiac arrest with ventricular fibrillation I46.9 427.5    I49.01 427.41   2. Acute UTI N39.0 599.0   3. Chronic systolic congestive heart failure I50.22 428.22     428.0   4. Sepsis, due to unspecified organism A41.9 038.9     995.91   5. Do-not-intubate resuscitation status Z66 V49.86   6. Impaired functional mobility, balance, gait, and endurance Z74.09 V49.89   7. Impaired mobility and ADLs Z74.09 799.89             Outcome Measures       17 1300          How much help from another person do you currently need...    Turning from your back to your side while in flat bed without using bedrails? 3  -UD      Moving from lying on back to sitting on the side of a flat bed without bedrails? 3  -UD      Moving to and from a bed to a chair (including a wheelchair)? 2  -UD      Standing up from a chair using your arms (e.g., wheelchair, bedside chair)? 3  -UD      Climbing 3-5 steps with a railing? 2  -UD      To walk in hospital room? 3  -UD      AM-PAC 6 Clicks Score 16  -UD        User Key  (r) = Recorded By, (t) = Taken By, (c) = Cosigned By    Initials Name Provider Type    UD Violeta Barriga PTA Physical Therapy Assistant                      IP PT Goals       17 1414 17 1415 06/15/17 0838    Bed Mobility PT LTG    Bed Mobility PT LTG, Outcome goal ongoing  -UD goal ongoing  -UD goal ongoing  -UD    Transfer Training PT LTG    Transfer Training PT LTG, Outcome  goal met  -UD goal ongoing  -UD    Gait Training PT LTG    Gait Training Goal PT LTG, Outcome   goal met  -UD      17 1513 17 0922       Bed  Mobility PT LTG    Bed Mobility PT LTG, Date Established  06/11/17  -SR     Bed Mobility PT LTG, Time to Achieve  by discharge  -SR     Bed Mobility PT LTG, Activity Type  all bed mobility  -SR     Bed Mobility PT LTG, [place] Level  independent  -SR     Bed Mobility PT LTG, Date Goal Reviewed 06/12/17  -MJ      Bed Mobility PT LTG, Outcome goal ongoing  -MJ      Transfer Training PT LTG    Transfer Training PT LTG, Date Established  06/11/17  -SR     Transfer Training PT LTG, Time to Achieve  by discharge  -SR     Transfer Training PT LTG, Activity Type  all transfers  -SR     Transfer Training PT LTG, [place] Level  contact guard assist  -SR     Transfer Training PT LTG, Assist Device  walker, rolling  -SR     Transfer Training PT  LTG, Date Goal Reviewed 06/12/17  -MJ      Transfer Training PT LTG, Outcome goal ongoing  -MJ      Gait Training PT LTG    Gait Training Goal PT LTG, Date Established  06/11/17  -SR     Gait Training Goal PT LTG, Time to Achieve  by discharge  -SR     Gait Training Goal PT LTG, [place] Level  contact guard assist  -SR     Gait Training Goal PT LTG, Assist Device  walker, rolling  -SR     Gait Training Goal PT LTG, Distance to Achieve  150  -SR     Gait Training Goal PT LTG, Date Goal Reviewed 06/12/17  -MJ      Gait Training Goal PT LTG, Outcome goal partially met   achieved distance  -MJ        User Key  (r) = Recorded By, (t) = Taken By, (c) = Cosigned By    Initials Name Provider Type    RUBI Barriga, PTA Physical Therapy Assistant    SR Kelsi Tobias, PT Physical Therapist    MAYRA Gifford, PT Physical Therapist              PT Discharge Summary  Reason for Discharge: Discharge from facility  Outcomes Achieved: Refer to plan of care for updates on goals achieved  Discharge Destination: other (comment) (inpatient hospice)      Janie Robb, PT   6/21/2017

## 2017-06-22 NOTE — THERAPY DISCHARGE NOTE
Acute Care - Occupational Therapy Discharge Summary  [facility]     Patient Name: José Miguel Mackey Jr.  : 1929  MRN: [id number]    Today's Date: 2017  Onset of Illness/Injury or Date of Surgery Date: 17    Date of Referral to OT: 06/10/17  Referring Physician: DO Fitz      Admit Date: 2017        OT Recommendation and Plan    Visit Dx:    ICD-10-CM ICD-9-CM   1. Cardiac arrest with ventricular fibrillation I46.9 427.5    I49.01 427.41   2. Acute UTI N39.0 599.0   3. Chronic systolic congestive heart failure I50.22 428.22     428.0   4. Sepsis, due to unspecified organism A41.9 038.9     995.91   5. Do-not-intubate resuscitation status Z66 V49.86   6. Impaired functional mobility, balance, gait, and endurance Z74.09 V49.89   7. Impaired mobility and ADLs Z74.09 799.89                     OT Goals       17 1530 17 0940       Bed Mobility OT LTG    Bed Mobility OT LTG, Date Established  17  -AC     Bed Mobility OT LTG, Time to Achieve  by discharge  -AC     Bed Mobility OT LTG, Activity Type  supine to sit/sit to supine  -AC     Bed Mobility OT LTG, [place] Level  minimum assist (75% patient effort)  -AC     Bed Mobility OT LTG, Assist Device  bed rails  -AC     Bed Mobility OT LTG, Outcome goal ongoing  -AC      Strength OT LTG    Strength Goal OT LTG, Date Established  17  -AC     Strength Goal OT LTG, Time to Achieve  by discharge  -AC     Strength Goal OT LTG, Measure to Achieve  Pt will increase BUE strenghth 1 MMG as needed to support ADLs  -AC     Strength Goal OT LTG, Outcome goal ongoing  -AC      LB Dressing OT LTG    LB Dressing Goal OT LTG, Date Established  17  -AC     LB Dressing Goal OT LTG, Time to Achieve  by discharge  -AC     LB Dressing Goal OT LTG, [place] Level  contact guard assist  -AC     LB Dressing Goal OT LTG, Outcome goal ongoing  -AC      Functional Mobility OT LTG    Functional Mobility Goal OT LTG, Date Established   06/11/17  -AC     Functional Mobility Goal OT LTG, Time to Achieve  by discharge  -AC     Functional Mobility Goal OT LTG, [place] Level  contact guard  -AC     Functional Mobility Goal OT LTG, Assist Device  rolling walker  -AC     Functional Mobility Goal OT LTG, Distance to Achieve  in hallway;to the bathroom  -AC     Functional Mobility Goal OT LTG, Outcome goal ongoing  -AC        User Key  (r) = Recorded By, (t) = Taken By, (c) = Cosigned By    Initials Name Provider Type    LEANNA Morales, OT Occupational Therapist                  OT Discharge Summary  Reason for Discharge: Discharge from facility  Outcomes Achieved: Refer to plan of care for updates on goals achieved  Discharge Destination: other (comment) (hospice)      Radha Foy, OT  6/22/2017

## 2017-07-01 NOTE — SIGNIFICANT NOTE
Exam confirms with auscultation zero audible heart tones and zero audible respirations. Mr.Charles ROSAS Mackey Jr. was pronounced dead at 0837 on 7-1-2017.  MD notified by Patient's RN.    Gaye Johnston RN  Clinical House Supervisor  7/1/2017 9:07 AM

## 2017-07-01 NOTE — PROGRESS NOTES
Continued Stay Note  [facility]     Patient Name: José Miguel Mackey Jr.  MRN: [id number]  Today's Date: 2017    Admit Date: 2017          Discharge Plan       17 0840    Case Management/Social Work Plan    Plan Hospice Death note    Additional Comments [facility] staff nurse called hospice team to notify of patient's death. Hospice team visited the daughter and son at the bedside. Family tearful, but appreciative of all care received from hospice services and [facility] staff. Daughter stated pt's wife was not aware yet, her sister was going to get wife from beauty shop to tell her and bring to the [facility] for visitation. Daughter stated they were going to use [facility]  services in [place], MSW talked to family about bereavement services, and advised we would be available should the patient's wife like to see us once she arrived. Should hospice be needed for additional questions/concerns/support please call ext [phone]              Discharge Codes     None            Sabiha Tovar RN

## 2017-07-01 NOTE — DISCHARGE SUMMARY
Date of Death:  7/1/2017  Time of Death:  0837    Presenting Problem/History of Present Illness  Patient Active Problem List   Diagnosis   • Abdominal pain   • Anemia   • Ascites   • Benign essential hypertension   • Cardiomyopathy   • Chronic kidney disease, stage IV (severe)   • Confusional state   • Constipation   • Atherosclerosis of native coronary artery of native heart   • Dementia   • Depression   • Diabetes mellitus   • Edema   • Gastroesophageal reflux disease   • Gout   • Arthralgia of hip   • Hyperlipidemia   • Hypertension   • Knee pain   • Memory loss   • Osteoarthritis of knee   • Shortness of breath   • Sinus bradycardia   • Swelling of lower extremity   • Tremor   • Urinary tract infection   • Acute on chronic systolic congestive heart failure   • Dyslipidemia   • Mild dementia   • Right bundle branch block   • Weakness of both legs   • Chronic midline low back pain without sciatica   • Ventricular fibrillation   • Prolonged QT interval   • Decubitus ulcer of coccygeal region, stage 2 (POA)   • Cardiac arrest       Hospital Course  Patient was a 87 y.o. male presented with end-stage cardiac disease. Patient initially came into the acute care hospital and underwent a V. fib arrest. Patient became very encephalopathic after this and was either awake and agitated or asleep/sedated. Medicines adjusted multiple times and multiple tried to see if that would alleviate the patient's agitation, however this was not successful. Ultimately the family finally elected to proceed more a comfort focus plan of care.       Pt admitted into Hospice on 6/19/17 for acute symptom mgmt.     Consults:   Consults     Date and Time Order Name Status Description    6/13/2017 1124 Inpatient Consult to Palliative Care MD Completed     6/8/2017 0731 Inpatient Consult to Infectious Diseases Completed         Exam confirms with auscultation zero audible heart tones and zero audible respirations. Mr.Charles ROSAS Mackey Jr. was  pronounced dead at 0837 on 7-1-2017.  MD notified by Patient's RN.     Gaye Johnston RN  Clinical House Supervisor  7/1/2017 9:07 AM    SEBASTIAN Lea  07/01/17  12:00 PM

## 2017-07-01 NOTE — PLAN OF CARE
Problem: Grieving (Adult)  Goal: Identify Related Risk Factors and Signs and Symptoms  Outcome: Ongoing (interventions implemented as appropriate)

## 2017-07-01 NOTE — PLAN OF CARE
Problem: Dying Patient, Actively (Adult)  Goal: Comfort/Pain Control  Outcome: Ongoing (interventions implemented as appropriate)

## 2017-07-01 NOTE — PLAN OF CARE
Problem: Fluid Volume Deficit (Adult)  Goal: Identify Related Risk Factors and Signs and Symptoms  Outcome: Ongoing (interventions implemented as appropriate)  Goal: Fluid/Electrolyte Balance  Outcome: Ongoing (interventions implemented as appropriate)  Goal: Comfort/Well Being  Outcome: Ongoing (interventions implemented as appropriate)    Problem: Patient Care Overview (Adult)  Goal: Plan of Care Review  Outcome: Ongoing (interventions implemented as appropriate)    06/30/17 1632 06/30/17 2000 07/01/17 0242   Coping/Psychosocial Response Interventions   Plan Of Care Reviewed With --  family --    Patient Care Overview   Progress no change --  --    Outcome Evaluation   Outcome Summary/Follow up Plan --  --  Pt resting comfortably. Multiple PRN medications. Robinol added to medication regimen.       Goal: Adult Individualization and Mutuality  Outcome: Ongoing (interventions implemented as appropriate)  Goal: Discharge Needs Assessment  Outcome: Ongoing (interventions implemented as appropriate)    Problem: Dying Patient, Actively (Adult)  Goal: Identify Related Risk Factors and Signs and Symptoms  Outcome: Ongoing (interventions implemented as appropriate)  Goal: Comfort/Pain Control  Outcome: Ongoing (interventions implemented as appropriate)  Goal: Dying Process, Peace and Dignity  Outcome: Ongoing (interventions implemented as appropriate)    Problem: Grieving (Adult)  Goal: Identify Related Risk Factors and Signs and Symptoms  Outcome: Ongoing (interventions implemented as appropriate)  Goal: Knowledge of Grief and Grieving  Outcome: Ongoing (interventions implemented as appropriate)    Problem: Pressure Ulcer Risk (Jim Scale) (Adult,Obstetrics,Pediatric)  Goal: Identify Related Risk Factors and Signs and Symptoms  Outcome: Ongoing (interventions implemented as appropriate)  Goal: Skin Integrity  Outcome: Ongoing (interventions implemented as appropriate)    Problem: Pressure Ulcer (Adult)  Goal: Signs and  Symptoms of Listed Potential Problems Will be Absent or Manageable (Pressure Ulcer)  Outcome: Ongoing (interventions implemented as appropriate)

## 2017-11-24 NOTE — PLAN OF CARE
Patient Education     Osteoarthritis: Common Sites  Osteoarthritis (OA) is sometimes called degenerative joint disease or wear-and-tear arthritis. It's the most common type of arthritis. In OA, the cartilage wears away. Cartilage covers the ends of bones and acts as a cushion. If enough cartilage wears away, bone rubs against bone. The joint changes in OA cause pain, stiffness, and trouble moving. OA may occur in any joint. Some joint that are commonly affected are the spin, neck,  hips, knees, fingers and toes.     Neck  Joints between small bones in the neck may wear out. Pain may travel to the shoulder or the base of the skull.  Lumbar Spine  Bony spurs may form on the joints between the vertebrae (spinal bones). And disks (cushions of cartilage between vertebrae) may wear down. Pain may affect the lower back or leg.  Hip  Cartilage damage can occur in the large “ball and socket” joint that connects the pelvis and thighbone. Pain may travel to the groin, buttocks, or knee.  Knee  The cartilage in the knee joint may wear down. Weakness or instability in the knee joint may make walking or climbing stairs difficult.  Fingers  Finger joints may become enlarged and knobby. Grasping objects may be hard, especially if the joint at the base of the thumb is affected.  Toes  Toes may be affected. Arthritis may cause a bunion, a bump at the base of the big toe. Standing or walking may be painful.  © 5276-9671 [employer]. [address] [place] [address], [place] [address]. All rights reserved. This information is not intended as a substitute for professional medical care. Always follow your healthcare professional's instructions.            Problem: Patient Care Overview (Adult)  Goal: Plan of Care Review  Outcome: Ongoing (interventions implemented as appropriate)    06/19/17 6227   Coping/Psychosocial Response Interventions   Plan Of Care Reviewed With daughter   Patient Care Overview   Progress declining